# Patient Record
Sex: FEMALE | Race: BLACK OR AFRICAN AMERICAN | NOT HISPANIC OR LATINO | Employment: UNEMPLOYED | URBAN - METROPOLITAN AREA
[De-identification: names, ages, dates, MRNs, and addresses within clinical notes are randomized per-mention and may not be internally consistent; named-entity substitution may affect disease eponyms.]

---

## 2021-07-01 ENCOUNTER — PATIENT MESSAGE (OUTPATIENT)
Dept: ADMINISTRATIVE | Facility: OTHER | Age: 31
End: 2021-07-01

## 2022-09-28 ENCOUNTER — HOSPITAL ENCOUNTER (EMERGENCY)
Facility: HOSPITAL | Age: 32
Discharge: HOME | End: 2022-09-28
Attending: FAMILY MEDICINE
Payer: MEDICAID

## 2022-09-28 VITALS
RESPIRATION RATE: 16 BRPM | HEART RATE: 100 BPM | TEMPERATURE: 98 F | WEIGHT: 180 LBS | DIASTOLIC BLOOD PRESSURE: 87 MMHG | SYSTOLIC BLOOD PRESSURE: 132 MMHG | BODY MASS INDEX: 30.73 KG/M2 | HEIGHT: 64 IN | OXYGEN SATURATION: 100 %

## 2022-09-28 DIAGNOSIS — M25.472 PAIN AND SWELLING OF LEFT ANKLE: ICD-10-CM

## 2022-09-28 DIAGNOSIS — M25.572 PAIN AND SWELLING OF LEFT ANKLE: ICD-10-CM

## 2022-09-28 DIAGNOSIS — S82.852S TRIMALLEOLAR FRACTURE OF ANKLE, CLOSED, LEFT, SEQUELA: Primary | ICD-10-CM

## 2022-09-28 PROCEDURE — 99284 EMERGENCY DEPT VISIT MOD MDM: CPT | Mod: 25

## 2022-09-28 PROCEDURE — 63600175 PHARM REV CODE 636 W HCPCS: Performed by: PHYSICIAN ASSISTANT

## 2022-09-28 PROCEDURE — 25000003 PHARM REV CODE 250: Performed by: PHYSICIAN ASSISTANT

## 2022-09-28 PROCEDURE — 96372 THER/PROPH/DIAG INJ SC/IM: CPT | Performed by: PHYSICIAN ASSISTANT

## 2022-09-28 RX ORDER — HYDROCODONE BITARTRATE AND ACETAMINOPHEN 10; 325 MG/1; MG/1
1 TABLET ORAL EVERY 6 HOURS PRN
Qty: 18 TABLET | Refills: 0 | Status: ON HOLD | OUTPATIENT
Start: 2022-09-28 | End: 2022-10-11 | Stop reason: HOSPADM

## 2022-09-28 RX ORDER — HYDROCODONE BITARTRATE AND ACETAMINOPHEN 10; 325 MG/1; MG/1
1 TABLET ORAL
Status: COMPLETED | OUTPATIENT
Start: 2022-09-28 | End: 2022-09-28

## 2022-09-28 RX ORDER — KETOROLAC TROMETHAMINE 30 MG/ML
60 INJECTION, SOLUTION INTRAMUSCULAR; INTRAVENOUS
Status: COMPLETED | OUTPATIENT
Start: 2022-09-28 | End: 2022-09-28

## 2022-09-28 RX ADMIN — HYDROCODONE BITARTRATE AND ACETAMINOPHEN 1 TABLET: 10; 325 TABLET ORAL at 07:09

## 2022-09-28 RX ADMIN — KETOROLAC TROMETHAMINE 60 MG: 30 INJECTION, SOLUTION INTRAMUSCULAR; INTRAVENOUS at 07:09

## 2022-09-29 DIAGNOSIS — G89.29 CHRONIC PAIN OF LEFT ANKLE: Primary | ICD-10-CM

## 2022-09-29 DIAGNOSIS — M25.572 CHRONIC PAIN OF LEFT ANKLE: Primary | ICD-10-CM

## 2022-09-29 NOTE — ED PROVIDER NOTES
Encounter Date: 9/28/2022       History     Chief Complaint   Patient presents with    Leg Pain     PT W LT TRIMALLEOLAR FX ON 9/24/22 WHILE PLAYING VOLLEYBALL LANDED WRONG AND TWISTED IT. SEEN AT [facility], SPLINT INTACT BUT STILL WAITING ON ORTHO REFERRAL.  PT OUT OF PAIN MEDS.  NVI DISTALTO INJURY.       31 YO AAF in ER with complaints of broken left ankle on 9/25/2022 and needs surgery. Was initially seen at [facility] and was referred to Ortho at [facility] but she has not gotten an appointment yet. She is also out of pain medications. Denies fever, chills, chest pain, SOB, abdominal pain, N/V/D, HA or dizziness. No other complaints.     The history is provided by the patient.   Review of patient's allergies indicates:  No Known Allergies  Past Medical History:   Diagnosis Date    Arthritis      No past surgical history on file.  No family history on file.  Social History     Tobacco Use    Smoking status: Every Day     Packs/day: 0.50     Types: Cigarettes    Smokeless tobacco: Never   Substance Use Topics    Alcohol use: No    Drug use: No     Review of Systems   Constitutional:  Negative for chills and fever.   HENT:  Negative for congestion and sore throat.    Respiratory:  Negative for shortness of breath.    Cardiovascular:  Negative for chest pain.   Gastrointestinal:  Negative for abdominal pain, diarrhea, nausea and vomiting.   Genitourinary:  Negative for dysuria.   Musculoskeletal:  Positive for arthralgias and joint swelling. Negative for back pain.   Skin:  Negative for rash.   Neurological:  Negative for dizziness, weakness, light-headedness and headaches.   Hematological:  Does not bruise/bleed easily.   All other systems reviewed and are negative.    Physical Exam     Initial Vitals [09/28/22 1813]   BP Pulse Resp Temp SpO2   126/88 107 16 97.9 °F (36.6 °C) 100 %      MAP       --         Physical Exam    Nursing note and vitals reviewed.  Constitutional: She appears well-developed and well-nourished. She is not  diaphoretic. No distress.   HENT:   Head: Normocephalic and atraumatic.   Mouth/Throat: Oropharynx is clear and moist.   Eyes: Conjunctivae are normal.   Cardiovascular:  Normal rate, regular rhythm, normal heart sounds and intact distal pulses.           Pulmonary/Chest: Breath sounds normal.   Musculoskeletal:      Comments: LLE in splint (ankle stirrup and posterior leg), good pedal pulse to LLE and able to wiggle all toes, mild swelling noted to toes and distal aspect of dorsum of left foot     Neurological: She is alert and oriented to person, place, and time.   Skin: Skin is warm and dry.   Psychiatric: She has a normal mood and affect.       ED Course   Procedures  Labs Reviewed - No data to display       Imaging Results    None          Medications   ketorolac injection 60 mg (60 mg Intramuscular Given 9/28/22 1908)   HYDROcodone-acetaminophen  mg per tablet 1 tablet (1 tablet Oral Given 9/28/22 1908)                 ED Course as of 09/29/22 0039   Wed Sep 28, 2022   1918 Imaging uploaded to our system from [facility], will refer to Ortho today and new Rx for pain meds given, VSS, NAD, pt is non-toxic or ill appearing, treatment plan and discharge instructions including follow up discussed, pt verbalized understanding, all questions answered, pt is stable and ready for discharge  [TT]      ED Course User Index  [TT] GIOVANI De Dios                 Clinical Impression:   Final diagnoses:  [S82.852S] Trimalleolar fracture of ankle, closed, left, sequela (Primary)  [M25.572, M25.472] Pain and swelling of left ankle        ED Disposition Condition    Discharge Stable          ED Prescriptions       Medication Sig Dispense Start Date End Date Auth. Provider    HYDROcodone-acetaminophen (NORCO)  mg per tablet Take 1 tablet by mouth every 6 (six) hours as needed for Pain. 18 tablet 9/28/2022 -- GIOVANI De Dios          Follow-up Information       Follow up With Specialties Details Why Contact Info [facility]  Go in 1 week Orthopedics Clinic, they will call you with an appointment [address] [phone]    [facility] Emergency Medicine In 3 days As needed, If symptoms worsen [address] [id number] [phone]    Primary Care Provider  Schedule an appointment as soon as possible for a visit in 5 days  Call a PCP to make an appointment for follow up within 3-5  days.  You can all the phone number on the back of your insurance card for accepting providers as discussed.             GIOVANI De Dios  09/29/22 0039

## 2022-09-29 NOTE — DISCHARGE INSTRUCTIONS
Do not put any weight on your left foot/leg, continue using crutches.    Follow up with [facility] Ortho clinic within in 1 week, they will call you with an appointment.       Take medications as needed for pain. Do not take narcotic pain medications and drive or operate machinery.

## 2022-10-03 ENCOUNTER — ANESTHESIA EVENT (OUTPATIENT)
Dept: SURGERY | Facility: HOSPITAL | Age: 32
End: 2022-10-03
Payer: MEDICAID

## 2022-10-03 ENCOUNTER — HOSPITAL ENCOUNTER (OUTPATIENT)
Dept: RADIOLOGY | Facility: HOSPITAL | Age: 32
Discharge: HOME | End: 2022-10-03
Attending: STUDENT IN AN ORGANIZED HEALTH CARE EDUCATION/TRAINING PROGRAM
Payer: MEDICAID

## 2022-10-03 ENCOUNTER — OFFICE VISIT (OUTPATIENT)
Dept: ORTHOPEDICS | Facility: CLINIC | Age: 32
End: 2022-10-03
Payer: MEDICAID

## 2022-10-03 VITALS — HEIGHT: 64 IN | WEIGHT: 180 LBS | BODY MASS INDEX: 30.73 KG/M2

## 2022-10-03 DIAGNOSIS — S82.872D CLOSED DISPLACED PILON FRACTURE OF LEFT TIBIA WITH ROUTINE HEALING, SUBSEQUENT ENCOUNTER: Primary | ICD-10-CM

## 2022-10-03 DIAGNOSIS — M25.472 PAIN AND SWELLING OF LEFT ANKLE: ICD-10-CM

## 2022-10-03 DIAGNOSIS — S82.852S TRIMALLEOLAR FRACTURE OF ANKLE, CLOSED, LEFT, SEQUELA: ICD-10-CM

## 2022-10-03 DIAGNOSIS — S82.872D CLOSED DISPLACED PILON FRACTURE OF LEFT TIBIA WITH ROUTINE HEALING, SUBSEQUENT ENCOUNTER: ICD-10-CM

## 2022-10-03 DIAGNOSIS — M25.572 PAIN AND SWELLING OF LEFT ANKLE: ICD-10-CM

## 2022-10-03 PROCEDURE — 73610 X-RAY EXAM OF ANKLE: CPT | Mod: TC,LT

## 2022-10-03 PROCEDURE — 99204 PR OFFICE/OUTPT VISIT, NEW, LEVL IV, 45-59 MIN: ICD-10-PCS | Mod: S$PBB,,, | Performed by: ORTHOPAEDIC SURGERY

## 2022-10-03 PROCEDURE — 99204 OFFICE O/P NEW MOD 45 MIN: CPT | Mod: S$PBB,,, | Performed by: ORTHOPAEDIC SURGERY

## 2022-10-03 PROCEDURE — 99215 OFFICE O/P EST HI 40 MIN: CPT | Mod: PBBFAC

## 2022-10-03 RX ORDER — HYDROCODONE BITARTRATE AND ACETAMINOPHEN 10; 325 MG/1; MG/1
1 TABLET ORAL EVERY 6 HOURS PRN
Qty: 28 TABLET | Refills: 0 | Status: SHIPPED | OUTPATIENT
Start: 2022-10-03 | End: 2022-10-07

## 2022-10-03 RX ORDER — MUPIROCIN 20 MG/G
OINTMENT TOPICAL
Status: CANCELLED | OUTPATIENT
Start: 2022-10-03

## 2022-10-03 RX ORDER — SODIUM CHLORIDE 9 MG/ML
INJECTION, SOLUTION INTRAVENOUS CONTINUOUS
Status: CANCELLED | OUTPATIENT
Start: 2022-10-03

## 2022-10-03 NOTE — LETTER
October 3, 2022      [facility]  [address] [place] [id number]  Phone: [phone]       Patient: Sharlene Tong   YOB: 1990  Date of Visit: 10/03/2022    To Whom It May Concern:    Thaddeus Tong  was at [facility] on 10/03/2022. The patient is scheduled for surgery 10/11/2022.  If you have any questions or concerns, or if I can be of further assistance, please do not hesitate to contact me.    Sincerely,  Dr. Juan Childs

## 2022-10-03 NOTE — ANESTHESIA PREPROCEDURE EVALUATION
10/03/2022  Sharlene Tong is a 32 y.o., female with PMHx of obesity, smoking, RA presents for ORIF Lt pilon fx.    COVID STATUS: TEST DOS   Latest Reference Range & Units 10/11/22 06:19   SARS Coronavirus 2 Antigen Negative  Negative     BETA-BLOCKER: NONE    PAT NURSE PHONE INTERVIEW 10/7/22    PROBLEM LIST:  -  LEFT DISTAL TIB/FIB FRACTURE 2/2 VOLLEYBALL INJURY 9/24/22  -  UPT STATUS  -  OBESITY CLASS I  -  RA  (PREV. on PLAQUENIL, BUT SELF-D/C'd)  -  C4-5 DISC DISEASE  -  SMOKER 5-6PPY    AM Rx DOS: NORCO PRN    ORDERS -   SURGEON: 11/17/21 BMP; 12/6/21 CBC, RENAL/HEPATIC FUNC.; NO NEW ORDERS  ANESTHESIA: UPT    Pre-op Assessment    I have reviewed the NPO Status.      Review of Systems  Anesthesia Hx:  No problems with previous Anesthesia    Social:  Smoker    Cardiovascular:  Cardiovascular Normal     Pulmonary:  Pulmonary Normal    Renal/:  Renal/ Normal     Hepatic/GI:  Hepatic/GI Normal    Neurological:  Neurology Normal    Endocrine:  Obesity / BMI > 30    Vitals:    10/11/22 0513 10/11/22 0538 10/11/22 0540 10/11/22 0614   BP:  112/70 112/70 110/72   BP Location:    Right arm   Patient Position:    Lying   Pulse:  78  65   Resp:    20   Temp:  36.6 °C (97.8 °F)  36.3 °C (97.3 °F)   TempSrc:  Oral  Temporal   SpO2:  100%  100%   Weight: 81.6 kg (179 lb 14.3 oz)            Physical Exam  General: Alert, Cooperative and Well nourished    Airway:  Mallampati: II   Mouth Opening: Normal  TM Distance: Normal  Tongue: Normal  Neck ROM: Normal ROM    Dental:  Intact    Chest/Lungs:  Clear to auscultation, Normal Respiratory Rate    Heart:  Rate: Normal  Rhythm: Regular Rhythm  Sounds: Normal       Latest Reference Range & Units 10/11/22 06:19   Preg Test, Ur Negative  Negative     Lab Results   Component Value Date    WBC 8.79 12/06/2021    HGB 13.8 12/06/2021    HCT 42.3 12/06/2021    MCV  97 12/06/2021     12/06/2021       CMP  Sodium   Date Value Ref Range Status   10/14/2019 140 136 - 145 mmol/L Final     Sodium Level   Date Value Ref Range Status   11/17/2021 139 136 - 145 mmol/L Final     Potassium   Date Value Ref Range Status   10/14/2019 4.1 3.5 - 5.1 mmol/L Final     Potassium Level   Date Value Ref Range Status   11/17/2021 4.1 3.5 - 5.1 mmol/L Final     Chloride   Date Value Ref Range Status   10/14/2019 107 95 - 110 mmol/L Final     CO2   Date Value Ref Range Status   10/14/2019 24 23 - 29 mmol/L Final     Carbon Dioxide   Date Value Ref Range Status   11/17/2021 23 22 - 29 mmol/L Final     Glucose   Date Value Ref Range Status   10/14/2019 83 70 - 110 mg/dL Final     BUN   Date Value Ref Range Status   12/06/2021 12 6 - 20 mg/dL Final     Creatinine   Date Value Ref Range Status   12/06/2021 0.8 0.5 - 1.4 mg/dL Final     Calcium   Date Value Ref Range Status   10/14/2019 9.4 8.7 - 10.5 mg/dL Final     Calcium Level Total   Date Value Ref Range Status   11/17/2021 9.7 8.7 - 10.5 mg/dL Final     Total Protein   Date Value Ref Range Status   10/14/2019 7.3 6.0 - 8.4 g/dL Final     Albumin   Date Value Ref Range Status   10/14/2019 4.1 3.5 - 5.2 g/dL Final     Total Bilirubin   Date Value Ref Range Status   10/14/2019 0.4 0.1 - 1.0 mg/dL Final     Comment:     For infants and newborns, interpretation of results should be based  on gestational age, weight and in agreement with clinical  observations.  Premature Infant recommended reference ranges:  Up to 24 hours.............<8.0 mg/dL  Up to 48 hours............<12.0 mg/dL  3-5 days..................<15.0 mg/dL  6-29 days.................<15.0 mg/dL       Alkaline Phosphatase   Date Value Ref Range Status   10/14/2019 68 55 - 135 U/L Final     AST   Date Value Ref Range Status   12/06/2021 11 10 - 40 U/L Final     ALT   Date Value Ref Range Status   12/06/2021 12 10 - 44 U/L Final     Anion Gap   Date Value Ref Range Status    10/14/2019 9 8 - 16 mmol/L Final     eGFR if    Date Value Ref Range Status   12/06/2021 >60.0 >60 mL/min/1.73 m^2 Final     eGFR if non    Date Value Ref Range Status   12/06/2021 >60.0 >60 mL/min/1.73 m^2 Final     Comment:     Calculation used to obtain the estimated glomerular filtration  rate (eGFR) is the CKD-EPI equation.            Anesthesia Plan  Type of Anesthesia, risks & benefits discussed:    Anesthesia Type: Gen Supraglottic Airway  Intra-op Monitoring Plan: Standard ASA Monitors  Post Op Pain Control Plan: IV/PO Opioids PRN  Induction:  IV  Airway Plan: Direct  Informed Consent: Informed consent signed with the Patient and all parties understand the risks and agree with anesthesia plan.  All questions answered.   ASA Score: 2  Day of Surgery Review of History & Physical: H&P Update referred to the surgeon/provider.    Ready For Surgery From Anesthesia Perspective.     .

## 2022-10-03 NOTE — PROGRESS NOTES
[facility] Orthopaedic Surgery Clinic Note    In brief, Sharlene Tong is a 32 y.o. female presenting as a new patient with left ankle pain      Reports that she was playing volleyball on 09/25/2022.  Landed awkwardly on her left lower leg.  Felt a pop.  Was unable bear weight.  Presented to an outside emergency department.  Radiographs were obtained.  Patient was told she had a fractured left lower leg.  She was placed in a splint.  Referral placed.    Since this time, persistent pain in the left lower leg.  Compliant with nonweightbearing.  Has been using crutches as well as a wheelchair for mobilization.  She states that since time of injury she has had global numbness to the left foot.  She has intermittent muscle spasms as well as shooting electric pains into the foot.  No prior injuries to left foot and ankle.  No prior surgeries.  Patient previously worked at [employer].  She likes being outside however she is not active.  She smokes approximately 7-8 cigarettes per day.  She has rheumatoid arthritis.  She is on Plaquenil.    PMH:   Past Medical History:   Diagnosis Date    Arthritis        PSH: No past surgical history on file.    SH:   Social History     Socioeconomic History    Marital status: Single   Tobacco Use    Smoking status: Every Day     Packs/day: 0.50     Types: Cigarettes    Smokeless tobacco: Never   Substance and Sexual Activity    Alcohol use: No    Drug use: No       FH: No family history on file.    Allergies: Review of patient's allergies indicates:  No Known Allergies    ROS:  Constitutional- no fever, fatigue, weakness  Eye- no vision loss, eye redness, drainage, or pain  ENMT- no sore throat, ear pain, sinus pain/congestion, nasal congestion/drainage  Respiratory- no cough, wheezing, or shortness of breath  CV- no chest pain, no palpitations, no edema  GI- no N/V/D; no abdominal pain    Physical Exam:  There were no vitals filed for this visit.    General: NAD  Cardio: RRR by  peripheral pulse  Pulm: Normal WOB on room air, symmetric chest rise  Abd: Soft, NT/ND    MSK:  Short-leg splint removed   No open wounds  Swelling about the medial and lateral malleolus   Tenderness throughout the distal tibia and fibula  Motor intact; firing EHL/FHL; ankle range of motion deferred secondary to no injury   Significantly decreased sensation to saphenous/sural/SP/DP/T distributions the DP palpable; digits perfused    Imaging:   Radiographs of the left ankle demonstrate comminuted left distal tibia fracture with associated fibula fracture    Assessment:  32-year-old female history of rheumatoid arthritis sustaining left lower leg injury 09/25/2022 consistent with left pilon fx    Given the instability of the fracture, involvement of the tibial plafond as well as the need for mobilization patient would benefit from operative fixation of fracture.  At this time will order CT scan for preoperative planning.  Patient will be in a Cam boot.  Nonweightbearing to left lower extremity.  Strict elevation.  May ice the lower leg.  Plan for open reduction internal fixation left distal tibia/fibula 10/11/2022.  Norco prescription prescribed the patient today in clinic.    Discussed with the patient that she has increased risk of infection and complications given her rheumatoid arthritis as well as smoking history.  Infection for this procedure may result in loss of limb.  Patient understands this.  Counseled regarding smoking cessation.    Fred Romero MD  [facility] Orthopaedic Surgery  10/3/2022 2:34 PM

## 2022-10-07 ENCOUNTER — HOSPITAL ENCOUNTER (OUTPATIENT)
Dept: RADIOLOGY | Facility: HOSPITAL | Age: 32
Discharge: HOME | End: 2022-10-07
Attending: STUDENT IN AN ORGANIZED HEALTH CARE EDUCATION/TRAINING PROGRAM
Payer: MEDICAID

## 2022-10-07 PROCEDURE — 73700 CT LOWER EXTREMITY W/O DYE: CPT | Mod: TC,LT

## 2022-10-11 ENCOUNTER — HOSPITAL ENCOUNTER (OUTPATIENT)
Facility: HOSPITAL | Age: 32
Discharge: HOME | End: 2022-10-11
Attending: ORTHOPAEDIC SURGERY | Admitting: ORTHOPAEDIC SURGERY
Payer: MEDICAID

## 2022-10-11 ENCOUNTER — ANESTHESIA (OUTPATIENT)
Dept: SURGERY | Facility: HOSPITAL | Age: 32
End: 2022-10-11
Payer: MEDICAID

## 2022-10-11 VITALS
BODY MASS INDEX: 30.88 KG/M2 | SYSTOLIC BLOOD PRESSURE: 149 MMHG | OXYGEN SATURATION: 100 % | RESPIRATION RATE: 15 BRPM | DIASTOLIC BLOOD PRESSURE: 87 MMHG | WEIGHT: 179.88 LBS | TEMPERATURE: 98 F | HEART RATE: 95 BPM

## 2022-10-11 DIAGNOSIS — S82.872D CLOSED DISPLACED PILON FRACTURE OF LEFT TIBIA WITH ROUTINE HEALING, SUBSEQUENT ENCOUNTER: ICD-10-CM

## 2022-10-11 LAB
B-HCG UR QL: NEGATIVE
CTP QC/QA: YES
CTP QC/QA: YES
SARS-COV-2 AG RESP QL IA.RAPID: NEGATIVE

## 2022-10-11 PROCEDURE — 81025 URINE PREGNANCY TEST: CPT | Performed by: NURSE PRACTITIONER

## 2022-10-11 PROCEDURE — 01480 ANES OPEN PX LOWER L/A/F NOS: CPT | Performed by: ORTHOPAEDIC SURGERY

## 2022-10-11 PROCEDURE — 71000015 HC POSTOP RECOV 1ST HR: Performed by: ORTHOPAEDIC SURGERY

## 2022-10-11 PROCEDURE — 63600175 PHARM REV CODE 636 W HCPCS: Performed by: ORTHOPAEDIC SURGERY

## 2022-10-11 PROCEDURE — C1769 GUIDE WIRE: HCPCS | Performed by: ORTHOPAEDIC SURGERY

## 2022-10-11 PROCEDURE — 63600175 PHARM REV CODE 636 W HCPCS: Performed by: NURSE ANESTHETIST, CERTIFIED REGISTERED

## 2022-10-11 PROCEDURE — 25000003 PHARM REV CODE 250

## 2022-10-11 PROCEDURE — 37000009 HC ANESTHESIA EA ADD 15 MINS: Performed by: ORTHOPAEDIC SURGERY

## 2022-10-11 PROCEDURE — 36000711: Performed by: ORTHOPAEDIC SURGERY

## 2022-10-11 PROCEDURE — 27201423 OPTIME MED/SURG SUP & DEVICES STERILE SUPPLY: Performed by: ORTHOPAEDIC SURGERY

## 2022-10-11 PROCEDURE — 27828 PR OPEN TREATMENT FRACTURE DISTAL TIBIA & FIBULA: ICD-10-PCS | Mod: LT,,, | Performed by: ORTHOPAEDIC SURGERY

## 2022-10-11 PROCEDURE — 25000242 PHARM REV CODE 250 ALT 637 W/ HCPCS

## 2022-10-11 PROCEDURE — 27828 TREAT LOWER LEG FRACTURE: CPT | Mod: LT,,, | Performed by: ORTHOPAEDIC SURGERY

## 2022-10-11 PROCEDURE — 25000003 PHARM REV CODE 250: Performed by: ANESTHESIOLOGY

## 2022-10-11 PROCEDURE — 25000003 PHARM REV CODE 250: Performed by: NURSE ANESTHETIST, CERTIFIED REGISTERED

## 2022-10-11 PROCEDURE — 37000008 HC ANESTHESIA 1ST 15 MINUTES: Performed by: ORTHOPAEDIC SURGERY

## 2022-10-11 PROCEDURE — 71000039 HC RECOVERY, EACH ADD'L HOUR: Performed by: ORTHOPAEDIC SURGERY

## 2022-10-11 PROCEDURE — C1713 ANCHOR/SCREW BN/BN,TIS/BN: HCPCS | Performed by: ORTHOPAEDIC SURGERY

## 2022-10-11 PROCEDURE — 71000033 HC RECOVERY, INTIAL HOUR: Performed by: ORTHOPAEDIC SURGERY

## 2022-10-11 PROCEDURE — 63600175 PHARM REV CODE 636 W HCPCS: Performed by: STUDENT IN AN ORGANIZED HEALTH CARE EDUCATION/TRAINING PROGRAM

## 2022-10-11 PROCEDURE — 36000710: Performed by: ORTHOPAEDIC SURGERY

## 2022-10-11 PROCEDURE — 71000016 HC POSTOP RECOV ADDL HR: Performed by: ORTHOPAEDIC SURGERY

## 2022-10-11 PROCEDURE — 63600175 PHARM REV CODE 636 W HCPCS: Performed by: ANESTHESIOLOGY

## 2022-10-11 DEVICE — SCREW AXSOS CANC 4X34MM: Type: IMPLANTABLE DEVICE | Site: ANKLE | Status: FUNCTIONAL

## 2022-10-11 DEVICE — SCREW BONE CORTICAL 3.5X36MM T: Type: IMPLANTABLE DEVICE | Site: ANKLE | Status: FUNCTIONAL

## 2022-10-11 DEVICE — SCREW BONE NONLOCK T8 2.7X16MM: Type: IMPLANTABLE DEVICE | Site: ANKLE | Status: FUNCTIONAL

## 2022-10-11 DEVICE — SCREW VARIAX LOCKING 2.7X10MM: Type: IMPLANTABLE DEVICE | Site: ANKLE | Status: FUNCTIONAL

## 2022-10-11 DEVICE — SCREW BONE LOCK T10 3.5X12MM: Type: IMPLANTABLE DEVICE | Site: ANKLE | Status: FUNCTIONAL

## 2022-10-11 DEVICE — SCREW BONE NON LOCK 3.5X12MM: Type: IMPLANTABLE DEVICE | Site: ANKLE | Status: FUNCTIONAL

## 2022-10-11 DEVICE — SCREW BONE CORTICAL 3.5X24MM T: Type: IMPLANTABLE DEVICE | Site: ANKLE | Status: FUNCTIONAL

## 2022-10-11 DEVICE — SCREW VARIAX NON-LOK 2.7X18MM: Type: IMPLANTABLE DEVICE | Site: ANKLE | Status: FUNCTIONAL

## 2022-10-11 DEVICE — SCREW VARIAX LOK FT 2.7X18MM: Type: IMPLANTABLE DEVICE | Site: ANKLE | Status: FUNCTIONAL

## 2022-10-11 DEVICE — SCREW BONE CORTICAL 3.5X26MM T: Type: IMPLANTABLE DEVICE | Site: ANKLE | Status: FUNCTIONAL

## 2022-10-11 DEVICE — PLATE BONE FIB VARIAX LAT DIST: Type: IMPLANTABLE DEVICE | Site: ANKLE | Status: FUNCTIONAL

## 2022-10-11 DEVICE — SCREW BONE CORTICAL 3.5X32MM T: Type: IMPLANTABLE DEVICE | Site: ANKLE | Status: FUNCTIONAL

## 2022-10-11 DEVICE — SCREW VARIAX 2 LOK TI 2.710MM: Type: IMPLANTABLE DEVICE | Site: ANKLE | Status: FUNCTIONAL

## 2022-10-11 RX ORDER — HYDROMORPHONE HYDROCHLORIDE 1 MG/ML
INJECTION, SOLUTION INTRAMUSCULAR; INTRAVENOUS; SUBCUTANEOUS
Status: DISCONTINUED
Start: 2022-10-11 | End: 2022-10-11 | Stop reason: HOSPADM

## 2022-10-11 RX ORDER — DEXAMETHASONE SODIUM PHOSPHATE 4 MG/ML
INJECTION, SOLUTION INTRA-ARTICULAR; INTRALESIONAL; INTRAMUSCULAR; INTRAVENOUS; SOFT TISSUE
Status: DISCONTINUED | OUTPATIENT
Start: 2022-10-11 | End: 2022-10-11

## 2022-10-11 RX ORDER — ACETAMINOPHEN 10 MG/ML
INJECTION, SOLUTION INTRAVENOUS
Status: DISCONTINUED
Start: 2022-10-11 | End: 2022-10-11 | Stop reason: HOSPADM

## 2022-10-11 RX ORDER — KETAMINE HYDROCHLORIDE 10 MG/ML
INJECTION, SOLUTION INTRAMUSCULAR; INTRAVENOUS
Status: DISCONTINUED | OUTPATIENT
Start: 2022-10-11 | End: 2022-10-11

## 2022-10-11 RX ORDER — KETOROLAC TROMETHAMINE 30 MG/ML
INJECTION, SOLUTION INTRAMUSCULAR; INTRAVENOUS
Status: DISCONTINUED | OUTPATIENT
Start: 2022-10-11 | End: 2022-10-11

## 2022-10-11 RX ORDER — FENTANYL CITRATE 50 UG/ML
INJECTION, SOLUTION INTRAMUSCULAR; INTRAVENOUS
Status: DISCONTINUED | OUTPATIENT
Start: 2022-10-11 | End: 2022-10-11

## 2022-10-11 RX ORDER — OXYCODONE HYDROCHLORIDE 5 MG/1
5 TABLET ORAL EVERY 6 HOURS PRN
Qty: 28 TABLET | Refills: 0 | Status: SHIPPED | OUTPATIENT
Start: 2022-10-11 | End: 2022-10-18

## 2022-10-11 RX ORDER — MELOXICAM 7.5 MG/1
7.5 TABLET ORAL DAILY
Qty: 7 TABLET | Refills: 0 | Status: SHIPPED | OUTPATIENT
Start: 2022-10-11 | End: 2022-10-18

## 2022-10-11 RX ORDER — SODIUM CHLORIDE, SODIUM LACTATE, POTASSIUM CHLORIDE, CALCIUM CHLORIDE 600; 310; 30; 20 MG/100ML; MG/100ML; MG/100ML; MG/100ML
INJECTION, SOLUTION INTRAVENOUS CONTINUOUS
Status: DISCONTINUED | OUTPATIENT
Start: 2022-10-11 | End: 2023-03-09

## 2022-10-11 RX ORDER — ROPIVACAINE HYDROCHLORIDE 5 MG/ML
INJECTION, SOLUTION EPIDURAL; INFILTRATION; PERINEURAL
Status: DISCONTINUED
Start: 2022-10-11 | End: 2022-10-11 | Stop reason: WASHOUT

## 2022-10-11 RX ORDER — VANCOMYCIN HYDROCHLORIDE 1 G/20ML
INJECTION, POWDER, LYOPHILIZED, FOR SOLUTION INTRAVENOUS
Status: DISCONTINUED | OUTPATIENT
Start: 2022-10-11 | End: 2022-10-11 | Stop reason: HOSPADM

## 2022-10-11 RX ORDER — SODIUM CHLORIDE 9 MG/ML
INJECTION, SOLUTION INTRAVENOUS CONTINUOUS
Status: DISCONTINUED | OUTPATIENT
Start: 2022-10-11 | End: 2022-10-11 | Stop reason: HOSPADM

## 2022-10-11 RX ORDER — HYDROMORPHONE HYDROCHLORIDE 1 MG/ML
0.5 INJECTION, SOLUTION INTRAMUSCULAR; INTRAVENOUS; SUBCUTANEOUS EVERY 5 MIN PRN
Status: COMPLETED | OUTPATIENT
Start: 2022-10-11 | End: 2022-10-11

## 2022-10-11 RX ORDER — PROPOFOL 10 MG/ML
INJECTION, EMULSION INTRAVENOUS
Status: DISCONTINUED | OUTPATIENT
Start: 2022-10-11 | End: 2022-10-11

## 2022-10-11 RX ORDER — MORPHINE SULFATE 2 MG/ML
INJECTION, SOLUTION INTRAMUSCULAR; INTRAVENOUS
Status: DISCONTINUED
Start: 2022-10-11 | End: 2022-10-11 | Stop reason: HOSPADM

## 2022-10-11 RX ORDER — IBUPROFEN 600 MG/1
600 TABLET ORAL EVERY 8 HOURS PRN
Qty: 21 TABLET | Refills: 0 | Status: SHIPPED | OUTPATIENT
Start: 2022-10-11 | End: 2022-10-11 | Stop reason: HOSPADM

## 2022-10-11 RX ORDER — MIDAZOLAM HYDROCHLORIDE 1 MG/ML
INJECTION INTRAMUSCULAR; INTRAVENOUS
Status: DISCONTINUED
Start: 2022-10-11 | End: 2022-10-11 | Stop reason: HOSPADM

## 2022-10-11 RX ORDER — ONDANSETRON 4 MG/1
4 TABLET, FILM COATED ORAL 2 TIMES DAILY PRN
Qty: 14 TABLET | Refills: 0 | Status: SHIPPED | OUTPATIENT
Start: 2022-10-11 | End: 2023-03-09

## 2022-10-11 RX ORDER — ASPIRIN 81 MG/1
81 TABLET ORAL 2 TIMES DAILY
Qty: 56 TABLET | Refills: 0 | Status: SHIPPED | OUTPATIENT
Start: 2022-10-11 | End: 2024-01-22

## 2022-10-11 RX ORDER — CEFAZOLIN SODIUM 2 G/50ML
2 SOLUTION INTRAVENOUS
Status: COMPLETED | OUTPATIENT
Start: 2022-10-11 | End: 2022-10-11

## 2022-10-11 RX ORDER — ONDANSETRON 2 MG/ML
INJECTION INTRAMUSCULAR; INTRAVENOUS
Status: DISCONTINUED | OUTPATIENT
Start: 2022-10-11 | End: 2022-10-11

## 2022-10-11 RX ORDER — LIDOCAINE HYDROCHLORIDE 10 MG/ML
1 INJECTION, SOLUTION EPIDURAL; INFILTRATION; INTRACAUDAL; PERINEURAL ONCE
Status: ACTIVE | OUTPATIENT
Start: 2022-10-11

## 2022-10-11 RX ORDER — GABAPENTIN 100 MG/1
300 CAPSULE ORAL 3 TIMES DAILY
Qty: 63 CAPSULE | Refills: 0 | Status: SHIPPED | OUTPATIENT
Start: 2022-10-11 | End: 2022-10-11 | Stop reason: HOSPADM

## 2022-10-11 RX ORDER — ACETAMINOPHEN 500 MG
1000 TABLET ORAL 3 TIMES DAILY
Qty: 42 TABLET | Refills: 0 | Status: SHIPPED | OUTPATIENT
Start: 2022-10-11 | End: 2022-10-18

## 2022-10-11 RX ORDER — CYCLOBENZAPRINE HCL 5 MG
5 TABLET ORAL 3 TIMES DAILY PRN
Qty: 30 TABLET | Refills: 0 | Status: SHIPPED | OUTPATIENT
Start: 2022-10-11 | End: 2022-10-21

## 2022-10-11 RX ORDER — SODIUM CHLORIDE 0.9 % (FLUSH) 0.9 %
10 SYRINGE (ML) INJECTION
Status: DISCONTINUED | OUTPATIENT
Start: 2022-10-11 | End: 2022-10-11 | Stop reason: HOSPADM

## 2022-10-11 RX ORDER — ACETAMINOPHEN 10 MG/ML
1000 INJECTION, SOLUTION INTRAVENOUS ONCE
Status: COMPLETED | OUTPATIENT
Start: 2022-10-11 | End: 2022-10-11

## 2022-10-11 RX ORDER — LIDOCAINE HCL/PF 100 MG/5ML
SYRINGE (ML) INTRAVENOUS
Status: DISCONTINUED | OUTPATIENT
Start: 2022-10-11 | End: 2022-10-11

## 2022-10-11 RX ORDER — MUPIROCIN 20 MG/G
OINTMENT TOPICAL
Status: DISCONTINUED | OUTPATIENT
Start: 2022-10-11 | End: 2022-10-11 | Stop reason: HOSPADM

## 2022-10-11 RX ORDER — GLYCOPYRROLATE 0.2 MG/ML
INJECTION INTRAMUSCULAR; INTRAVENOUS
Status: DISCONTINUED | OUTPATIENT
Start: 2022-10-11 | End: 2022-10-11

## 2022-10-11 RX ORDER — MEPERIDINE HYDROCHLORIDE 25 MG/ML
12.5 INJECTION INTRAMUSCULAR; INTRAVENOUS; SUBCUTANEOUS EVERY 10 MIN PRN
Status: DISCONTINUED | OUTPATIENT
Start: 2022-10-11 | End: 2022-10-11 | Stop reason: HOSPADM

## 2022-10-11 RX ORDER — ONDANSETRON 2 MG/ML
4 INJECTION INTRAMUSCULAR; INTRAVENOUS DAILY PRN
Status: DISCONTINUED | OUTPATIENT
Start: 2022-10-11 | End: 2022-10-11 | Stop reason: HOSPADM

## 2022-10-11 RX ORDER — OXYCODONE HYDROCHLORIDE 5 MG/1
5 TABLET ORAL
Status: DISCONTINUED | OUTPATIENT
Start: 2022-10-11 | End: 2022-10-11 | Stop reason: HOSPADM

## 2022-10-11 RX ORDER — ONDANSETRON 2 MG/ML
INJECTION INTRAMUSCULAR; INTRAVENOUS
Status: DISCONTINUED
Start: 2022-10-11 | End: 2022-10-11 | Stop reason: HOSPADM

## 2022-10-11 RX ORDER — MORPHINE SULFATE 2 MG/ML
2 INJECTION, SOLUTION INTRAMUSCULAR; INTRAVENOUS EVERY 5 MIN PRN
Status: DISCONTINUED | OUTPATIENT
Start: 2022-10-11 | End: 2022-10-11 | Stop reason: HOSPADM

## 2022-10-11 RX ORDER — LABETALOL HYDROCHLORIDE 5 MG/ML
INJECTION, SOLUTION INTRAVENOUS
Status: DISCONTINUED | OUTPATIENT
Start: 2022-10-11 | End: 2022-10-11

## 2022-10-11 RX ORDER — OXYCODONE AND ACETAMINOPHEN 5; 325 MG/1; MG/1
1 TABLET ORAL EVERY 6 HOURS PRN
Qty: 28 TABLET | Refills: 0 | Status: SHIPPED | OUTPATIENT
Start: 2022-10-11 | End: 2022-10-11 | Stop reason: HOSPADM

## 2022-10-11 RX ORDER — MIDAZOLAM HYDROCHLORIDE 1 MG/ML
2 INJECTION INTRAMUSCULAR; INTRAVENOUS ONCE AS NEEDED
Status: COMPLETED | OUTPATIENT
Start: 2022-10-11 | End: 2022-10-11

## 2022-10-11 RX ADMIN — KETAMINE HYDROCHLORIDE 25 MG: 10 INJECTION, SOLUTION INTRAMUSCULAR; INTRAVENOUS at 09:10

## 2022-10-11 RX ADMIN — KETOROLAC TROMETHAMINE 30 MG: 30 INJECTION, SOLUTION INTRAMUSCULAR; INTRAVENOUS at 09:10

## 2022-10-11 RX ADMIN — FENTANYL CITRATE 50 MCG: 50 INJECTION, SOLUTION INTRAMUSCULAR; INTRAVENOUS at 09:10

## 2022-10-11 RX ADMIN — OXYCODONE HYDROCHLORIDE 5 MG: 5 TABLET ORAL at 11:10

## 2022-10-11 RX ADMIN — FENTANYL CITRATE 25 MCG: 50 INJECTION, SOLUTION INTRAMUSCULAR; INTRAVENOUS at 07:10

## 2022-10-11 RX ADMIN — RACEPINEPHRINE HYDROCHLORIDE 0.5 ML: 11.25 SOLUTION RESPIRATORY (INHALATION) at 09:10

## 2022-10-11 RX ADMIN — ACETAMINOPHEN 1000 MG: 10 INJECTION, SOLUTION INTRAVENOUS at 10:10

## 2022-10-11 RX ADMIN — HYDROMORPHONE HYDROCHLORIDE 0.5 MG: 1 INJECTION, SOLUTION INTRAMUSCULAR; INTRAVENOUS; SUBCUTANEOUS at 10:10

## 2022-10-11 RX ADMIN — CEFAZOLIN SODIUM 2 G: 2 SOLUTION INTRAVENOUS at 07:10

## 2022-10-11 RX ADMIN — LABETALOL HYDROCHLORIDE 2.5 MG: 5 INJECTION, SOLUTION INTRAVENOUS at 08:10

## 2022-10-11 RX ADMIN — FENTANYL CITRATE 25 MCG: 50 INJECTION, SOLUTION INTRAMUSCULAR; INTRAVENOUS at 08:10

## 2022-10-11 RX ADMIN — ONDANSETRON 4 MG: 2 INJECTION INTRAMUSCULAR; INTRAVENOUS at 09:10

## 2022-10-11 RX ADMIN — SODIUM CHLORIDE, POTASSIUM CHLORIDE, SODIUM LACTATE AND CALCIUM CHLORIDE: 600; 310; 30; 20 INJECTION, SOLUTION INTRAVENOUS at 06:10

## 2022-10-11 RX ADMIN — FENTANYL CITRATE 25 MCG: 50 INJECTION, SOLUTION INTRAMUSCULAR; INTRAVENOUS at 09:10

## 2022-10-11 RX ADMIN — MORPHINE SULFATE 2 MG: 2 INJECTION, SOLUTION INTRAMUSCULAR; INTRAVENOUS at 10:10

## 2022-10-11 RX ADMIN — LABETALOL HYDROCHLORIDE 2.5 MG: 5 INJECTION, SOLUTION INTRAVENOUS at 07:10

## 2022-10-11 RX ADMIN — ONDANSETRON 4 MG: 2 INJECTION INTRAMUSCULAR; INTRAVENOUS at 10:10

## 2022-10-11 RX ADMIN — KETAMINE HYDROCHLORIDE 25 MG: 10 INJECTION, SOLUTION INTRAMUSCULAR; INTRAVENOUS at 07:10

## 2022-10-11 RX ADMIN — PROPOFOL 125 MG: 10 INJECTION, EMULSION INTRAVENOUS at 07:10

## 2022-10-11 RX ADMIN — LIDOCAINE HYDROCHLORIDE 50 MG: 20 INJECTION, SOLUTION INTRAVENOUS at 07:10

## 2022-10-11 RX ADMIN — GLYCOPYRROLATE 0.2 MG: 0.2 INJECTION INTRAMUSCULAR; INTRAVENOUS at 07:10

## 2022-10-11 RX ADMIN — FENTANYL CITRATE 50 MCG: 50 INJECTION, SOLUTION INTRAMUSCULAR; INTRAVENOUS at 07:10

## 2022-10-11 RX ADMIN — DEXAMETHASONE SODIUM PHOSPHATE 8 MG: 4 INJECTION, SOLUTION INTRA-ARTICULAR; INTRALESIONAL; INTRAMUSCULAR; INTRAVENOUS; SOFT TISSUE at 07:10

## 2022-10-11 RX ADMIN — MIDAZOLAM 2 MG: 1 INJECTION INTRAMUSCULAR; INTRAVENOUS at 06:10

## 2022-10-11 RX ADMIN — LIDOCAINE HYDROCHLORIDE 50 MG: 20 INJECTION, SOLUTION INTRAVENOUS at 09:10

## 2022-10-11 NOTE — DISCHARGE INSTRUCTIONS
· Keep follow up appointment on October 24th at 10:10 am at [facility].    · Take pain medications as prescribed.    · Keep leg elevated on pillow.  NO WEIGHT BEARING TO LEFT LEG.    · No driving or consuming alcohol for the next 24 hours or while taking narcotic pain medicine.    · May apply ice pack to surgical area for 20 minutes at a time 6-8 times per day.    · Dressing: Leave clean and dry until follow up appointment.    · Notify MD of any moderate to severe pain unrelieved by pain medicine or for any signs of infection including fever above 100.4, excessive redness or swelling, yellow/green foul- smelling drainage, nausea or vomiting. Call clinic at: [phone]. After business hours, if you are unable to reach a doctor on call at [phone] or your concern is an emergency, call [phone] or report to your nearest emergency room.    · Thanks for choosing [facility]! Have a smooth recovery!

## 2022-10-11 NOTE — TRANSFER OF CARE
Anesthesia Transfer of Care Note    Patient: Sharlene Tong    Procedure(s) Performed: Procedure(s) (LRB):  ORIF, FRACTURE, PILON (Left)    Patient location: PACU    Anesthesia Type: general    Transport from OR: Transported from OR on room air with adequate spontaneous ventilation    Post pain: adequate analgesia    Post assessment: no apparent anesthetic complications and tolerated procedure well    Post vital signs: stable    Level of consciousness: sedated    Nausea/Vomiting: no nausea/vomiting    Complications: none    Transfer of care protocol was followed      Last vitals:   Visit Vitals  /74 (BP Location: Right arm, Patient Position: Lying)   Pulse (!) 115   Temp 36.4 °C (97.5 °F) (Temporal)   Resp 20   Wt 81.6 kg (179 lb 14.3 oz)   SpO2 96%   Breastfeeding No   BMI 30.88 kg/m²

## 2022-10-11 NOTE — ANESTHESIA POSTPROCEDURE EVALUATION
Anesthesia Post Evaluation    Patient: Sharlene Tong    Procedure(s) Performed: Procedure(s) (LRB):  ORIF, FRACTURE, PILON (Left)    Final Anesthesia Type: general      Patient location during evaluation: PACU  Post-procedure vital signs: reviewed and stable  Airway patency: patent      Anesthetic complications: no      Cardiovascular status: hemodynamically stable  Respiratory status: spontaneous ventilation  Follow-up not needed.          Vitals Value Taken Time   /74 10/11/22 0955   Temp 36.4 °C (97.5 °F) 10/11/22 0955   Pulse 115 10/11/22 0959   Resp 16 10/11/22 1020   SpO2 96 % 10/11/22 0959         No case tracking events are documented in the log.      Pain/Abdoul Score: Pain Rating Prior to Med Admin: 10 (10/11/2022 10:34 AM)

## 2022-10-11 NOTE — ANESTHESIA PROCEDURE NOTES
Intubation    Date/Time: 10/11/2022 7:06 AM  Performed by: Aubrey Yusuf CRNA  Authorized by: Shala Mascorro MD     Intubation:     Induction:  Intravenous    Intubated:  Postinduction    Mask Ventilation:  Easy mask    Attempts:  1    Attempted By:  CRNA    Difficult Airway Encountered?: No      Airway Device:  Supraglottic airway/LMA    Airway Device Size:  4.0    Style/Cuff Inflation:  Uncuffed    Placement Verified By:  Capnometry    Complicating Factors:  None    Findings Post-Intubation:  BS equal bilateral and atraumatic/condition of teeth unchanged  Notes:      No leak @ 20 cm/ h2o

## 2022-10-11 NOTE — H&P
Interval H&P    Patient seen and examined in preop holding area. No changes to history or exam other than noted below.    To OR today for ORIF L pilon     [facility] Orthopaedic Surgery Clinic Note     In brief, Sharlene Tong is a 32 y.o. female presenting as a new patient with left ankle pain        Reports that she was playing volleyball on 09/25/2022.  Landed awkwardly on her left lower leg.  Felt a pop.  Was unable bear weight.  Presented to an outside emergency department.  Radiographs were obtained.  Patient was told she had a fractured left lower leg.  She was placed in a splint.  Referral placed.    Since this time, persistent pain in the left lower leg.  Compliant with nonweightbearing.  Has been using crutches as well as a wheelchair for mobilization.  She states that since time of injury she has had global numbness to the left foot.  She has intermittent muscle spasms as well as shooting electric pains into the foot.  No prior injuries to left foot and ankle.  No prior surgeries.  Patient previously worked at [employer].  She likes being outside however she is not active.  She smokes approximately 7-8 cigarettes per day.  She has rheumatoid arthritis.  She is on Plaquenil.     PMH:        Past Medical History:   Diagnosis Date    Arthritis           PSH: No past surgical history on file.     SH:   Social History               Socioeconomic History    Marital status: Single   Tobacco Use    Smoking status: Every Day       Packs/day: 0.50       Types: Cigarettes    Smokeless tobacco: Never   Substance and Sexual Activity    Alcohol use: No    Drug use: No            FH: No family history on file.     Allergies: Review of patient's allergies indicates:  No Known Allergies     ROS:  Constitutional- no fever, fatigue, weakness  Eye- no vision loss, eye redness, drainage, or pain  ENMT- no sore throat, ear pain, sinus pain/congestion, nasal congestion/drainage  Respiratory- no cough, wheezing, or  shortness of breath  CV- no chest pain, no palpitations, no edema  GI- no N/V/D; no abdominal pain     Physical Exam:  There were no vitals filed for this visit.     General: NAD  Cardio: RRR by peripheral pulse  Pulm: Normal WOB on room air, symmetric chest rise  Abd: Soft, NT/ND     MSK:  Short-leg splint removed   No open wounds  Swelling about the medial and lateral malleolus   Tenderness throughout the distal tibia and fibula  Motor intact; firing EHL/FHL; ankle range of motion deferred secondary to no injury   Significantly decreased sensation to saphenous/sural/SP/DP/T distributions the DP palpable; digits perfused     Imaging:   Radiographs of the left ankle demonstrate comminuted left distal tibia fracture with associated fibula fracture     Assessment:  32-year-old female history of rheumatoid arthritis sustaining left lower leg injury 09/25/2022 consistent with left pilon fx     Given the instability of the fracture, involvement of the tibial plafond as well as the need for mobilization patient would benefit from operative fixation of fracture.  At this time will order CT scan for preoperative planning.  Patient will be in a Cam boot.  Nonweightbearing to left lower extremity.  Strict elevation.  May ice the lower leg.  Plan for open reduction internal fixation left distal tibia/fibula 10/11/2022.  Norco prescription prescribed the patient today in clinic.     Discussed with the patient that she has increased risk of infection and complications given her rheumatoid arthritis as well as smoking history.  Infection for this procedure may result in loss of limb.  Patient understands this.  Counseled regarding smoking cessation.

## 2022-10-11 NOTE — DISCHARGE SUMMARY
[facility]  Discharge Note  Short Stay    Procedure(s) (LRB):  ORIF, FRACTURE, PILON (Left)      OUTCOME: Patient tolerated treatment/procedure well without complication and is now ready for discharge.    DISPOSITION: Home or Self Care    FINAL DIAGNOSIS:  <principal problem not specified>    FOLLOWUP: In clinic    DISCHARGE INSTRUCTIONS:    Discharge Procedure Orders   Leave dressing on - Keep it clean, dry, and intact until clinic visit     Weight bearing restrictions (specify):   Order Comments: MAYITO HAMMOND        TIME SPENT ON DISCHARGE: 30 minutes

## 2022-10-11 NOTE — OP NOTE
[facility] [address] [place] [address]    PATIENT NAME:      ISAÍAS AVALOS  YOB: 1990  CSN:               [id number]  MRN:               [id number]  ADMIT DATE:        10/11/2022 05:09:00  PHYSICIAN:         Momo Childs MD                          OPERATIVE REPORT      DATE OF SURGERY:    10/11/2022 00:00:00    SURGEON:  Momo Chidls MD    PREOPERATIVE DIAGNOSIS:  Left pilon fracture.    POSTOPERATIVE DIAGNOSIS:  Left pilon fracture.    PROCEDURE PERFORMED:  Open reduction, internal fixation of left pilon fracture.    IMPLANTS USED:    Implant Name Type Inv. Item Serial No.  Lot No. LRB No. Used Action   K-WIRE TRCR PNT [id number] - [id number]  K-WIRE TRCR PNT [id number] SS  JOANA [id number]  Left 4 Implanted and Explanted   6 hole plate    JOANA  Left 1 Implanted and Explanted   8 hole plate    JOANA  Left 1 Implanted   LOG [id number] - JOANA ANKLE SET - 1      Left     SCREW BONE CORTICAL 3.5X32MM T - [id number]  SCREW BONE CORTICAL 3.5X32MM T  JOANA SALES RIMA.  Left 2 Implanted   LOG [id number] - JOANA ANKLE SET - 2      Left     SCREW BONE CORTICAL 3.5X36MM T - [id number]  SCREW BONE CORTICAL 3.5X36MM T  JOANA [employer].  Left 2 Implanted   SCREW BONE CORTICAL 3.5X24MM T - [id number]  SCREW BONE CORTICAL 3.5X24MM T  JOANA [employer]IM[employer].  Left 2 Implanted   SCREW AXSOS CANC 4X34MM - [id number]  SCREW AXSOS CANC 4X34MM  JOANA [employer]IM[employer].  Left 2 Implanted   SCREW BONE CORTICAL 3.5X26MM T - [id number]  SCREW BONE CORTICAL 3.5X26MM T  JOANA [employer] RIM[employer].  Left 2 Implanted   SCREW BONE CORTICAL 3.5X28MM T - [id number]  SCREW BONE CORTICAL 3.5X28MM T  JOANA [employer] RIM[employer].  Left 1 Implanted and Explanted   SCREW BONE AXSOS 4X28MM TI - [id number]  SCREW BONE AXSOS 4X28MM TI  JOANA [employer]IM[employer].  Left 1 Implanted and Explanted   SCREW BONE NONLOCK T8  2.7X16MM - [id number]  SCREW BONE NONLOCK T8 2.7X16MM  JOANA Sense of Skin RIMA.  Left 1 Implanted   SCREW VARIAX NON-BEENA 2.7X18MM - [id number]  SCREW VARIAX NON-BEENA 2.7X18MM  JOANA [employer] RIMA.  Left 1 Implanted   SCREW VARIAX NON BEENA 2.7X20MM - [id number]  SCREW VARIAX NON BEENA 2.7X20MM  JOANA Sense [employer] Skin RIMA.  Left 1 Implanted and Explanted   SCREW BONE THREADED T8 - [id number]  SCREW BONE THREADED T8  JOANAPicateers RIMA.  Left 1 Implanted and Explanted   SCREW VARIAX LOCKING 2.7X10MM - [id number]  SCREW VARIAX LOCKING 2.7X10MM  JOANAPicateers RIMA.  Left 3 Implanted   SCREW VARIAX BEENA FT 2.7X18MM - [id number]  SCREW VARIAX BEENA FT 2.7X18MM  JOANAPicateers RIMA.  Left 1 Implanted   SCREW TITANIUM BEENA 2.7X12MM - [id number]  SCREW TITANIUM BEENA 2.7X12MM  Ziptr RIMA.  Left 1 Implanted   PLATE BONE FIB VARIAX LAT DIST - [id number]  PLATE BONE FIB VARIAX LAT DIST  JOANAPicateers RIMA.  Left 1 Implanted   SCREW VARIAX 2 BEENA TI 2.710MM - [id number]  SCREW VARIAX 2 BEENA TI 2.710MM  JOANA Sense [employer] Skin RIMA.  Left 2 Implanted   SCREW BONE NON LOCK 3.5X12MM - [id number]  SCREW BONE NON LOCK 3.5X12MM  JOANAPicateers RIMA.  Left 5 Implanted   SCREW BONE NON LOCK 3.5X12MM - [id number]  SCREW BONE NON LOCK 3.5X12MM  JOANA Sense [employer] Skin RIMA.  Left 1 Implanted and Explanted   SCREW BONE NON LOCK 3.5X14MM - [id number]  SCREW BONE NON LOCK 3.5X14MM  JOANAPicateers RIMA.  Left 1 Implanted and Explanted   SCREW BONE LOCK T10 3.5X12MM - [id number]  SCREW BONE LOCK T10 3.5X12MM  JOANA Sense [employer] Skin RIMA.  Left 1 Implanted   [id number] - JOANA ANKLE SET - 3           2.7x8 lock screw      Left 1 Implanted   2.7 x 8 screw      Left 2 Implanted   2.7 x 10 screw      Left 1 Implanted   4.0 x 32 plate distal anterior lateral       Left 2 Implanted   2.7 y plate      Left 1 Implanted         RESIDENT PHYSICIAN:  Dr. Fred Sheffield, PGY-5; Dr. Taiwo Slade,PGY-3.    ANESTHESIA:  General.    TOURNIQUET:  250 mmHg for 100 minutes.    ESTIMATED BLOOD LOSS:  150  cc.    INTRAOPERATIVE COMPLICATIONS:  None.    PREOPERATIVE NOTE:  Ms. Tong is a 32-year-old female who came to our clinic for   assessment of her left ankle injury.  She reports that she was playing   volleyball on September 25, 2022, when she landed awkwardly and hurt her left   ankle.  She went to a local emergency room and was diagnosed with the   aforementioned fracture.  She was referred to our clinic for followup assessment   and definitive management.  Unfortunately, she had difficulty finding an   orthopedic surgeon to accept her insurance and came to see us in a delayed   fashion.    PROCEDURE IN DETAIL:  The patient was brought to the operating room, positioned   supine on the OR table.  General anesthetic was administered by anesthesia   colleagues.  A preop pause was performed to confirm the site and side of   surgery.  The left lower extremity was prepped and draped in the usual fashion.    Preoperative antibiotics were given.  The extremity was exsanguinated,   tourniquet inflated to 250 mmHg.    I began by making an incision in the anterior aspect of the patient's ankle just   lateral to the tibial crest.  It was taken through the subcutaneous tissue.  We   identified the tibialis anterior, mobilized it laterally.  We exposed the   fracture site itself.  The fracture was opened and cleaned of any incarcerated   soft tissue.  We extended our incision distally to open up the anterior aspect   of the ankle joint and removed any loose bodies under direct visualization as   well as with irrigation.    The large anterior lateral fragment was then reduced and held in place with a   Verbrugge clamp. It was lagged in place using a 3.5mm cortical screw and lag by technique.  The fracture was thenstabilized with a Alli axos anterior lateral anatomic  tibial locking plate with a combination of locking and   cortical screws.  Intraoperative fluoroscopy confirmed appropriate position of   our hardware and near  anatomic reduction of her fracture.    We then turned our attention to the patient's medial malleolus through the same   incision.  It was reduced and provisionally held with k-wires then buttressed with a Starke Mini fragment plate.  Intraoperative   fluoroscopy again confirmed near anatomic reduction of the articular surface   and the appropriate position of our hardware.    We then turned our attention to the patient's fibular fracture.  An incision was   made over the posterolateral aspect of the patient's ankle.  It was taken down   through subcutaneous tissue.  Soft tissue was elevated off the fibula, and the   fracture was exposed.  Fracture was reduced and held in place with a pointed   reduction forceps.  Unfortunately, there was significant comminution in the   anterior aspect of the patient's fracture, and was not amenable to lag   fixation.    The fibular fracture was then stabilized with a Alli VariAx fibular locking   plate.  Again, intraoperative fluoroscopy confirmed near anatomic reduction of   our fracture and appropriate position of hardware.    Using intraoperative fluoroscopy, the syndesmosis was stretched with external   rotation stress radiographs.  We confirmed that the ankle and syndesmosis were   stable with no medial clear space or syndesmotic widening.    The wound was thoroughly irrigated with a combination of saline, followed by   peroxide, followed by saline.  1 g powdered vancomycin was divided between the   patient's wounds.  Tourniquet was let down.  Meticulous hemostasis was obtained.    The subcutaneous tissue was closed with 2-0 Vicryl suture.  Skin was closed   with 3-0 nylon sutures.  Sterile dressing was applied.  The patient was placed   in a short-leg splint.  General anesthetic was reversed and the patient was   transferred to the hospital stretcher, then brought to the recovery room in   stable condition.  There were no intraoperative complications.    POSTOPERATIVE  PLAN:  Patient will be nonweightbearing to the left lower   extremity for 6 weeks from time of surgery.  I will see her back in 2 weeks'   time for repeat clinical examination and imaging.        ______________________________  MD DARRIAN Ruano/AQS  DD:  10/11/2022  Time:  09:59AM  DT:  10/11/2022  Time:  10:34AM  Job #:  [id number]      OPERATIVE REPORT

## 2022-10-21 RX ORDER — HYDROCODONE BITARTRATE AND ACETAMINOPHEN 5; 325 MG/1; MG/1
1 TABLET ORAL EVERY 6 HOURS PRN
Qty: 28 TABLET | Refills: 0 | Status: SHIPPED | OUTPATIENT
Start: 2022-10-21 | End: 2022-10-28

## 2022-10-21 RX ORDER — CYCLOBENZAPRINE HCL 5 MG
5 TABLET ORAL 3 TIMES DAILY PRN
Qty: 21 TABLET | Refills: 0 | Status: SHIPPED | OUTPATIENT
Start: 2022-10-21 | End: 2022-10-28

## 2022-10-24 ENCOUNTER — HOSPITAL ENCOUNTER (OUTPATIENT)
Dept: RADIOLOGY | Facility: HOSPITAL | Age: 32
Discharge: HOME | End: 2022-10-24
Attending: STUDENT IN AN ORGANIZED HEALTH CARE EDUCATION/TRAINING PROGRAM
Payer: MEDICAID

## 2022-10-24 ENCOUNTER — OFFICE VISIT (OUTPATIENT)
Dept: ORTHOPEDICS | Facility: CLINIC | Age: 32
End: 2022-10-24
Payer: MEDICAID

## 2022-10-24 VITALS — WEIGHT: 179 LBS | BODY MASS INDEX: 30.56 KG/M2 | HEIGHT: 64 IN

## 2022-10-24 DIAGNOSIS — M25.572 LEFT ANKLE PAIN, UNSPECIFIED CHRONICITY: Primary | ICD-10-CM

## 2022-10-24 DIAGNOSIS — M25.572 LEFT ANKLE PAIN, UNSPECIFIED CHRONICITY: ICD-10-CM

## 2022-10-24 PROCEDURE — 99213 OFFICE O/P EST LOW 20 MIN: CPT | Mod: PBBFAC

## 2022-10-24 PROCEDURE — 1159F PR MEDICATION LIST DOCUMENTED IN MEDICAL RECORD: ICD-10-PCS | Mod: CPTII,,, | Performed by: ORTHOPAEDIC SURGERY

## 2022-10-24 PROCEDURE — 1159F MED LIST DOCD IN RCRD: CPT | Mod: CPTII,,, | Performed by: ORTHOPAEDIC SURGERY

## 2022-10-24 PROCEDURE — 99024 POSTOP FOLLOW-UP VISIT: CPT | Mod: ,,, | Performed by: ORTHOPAEDIC SURGERY

## 2022-10-24 PROCEDURE — 99024 PR POST-OP FOLLOW-UP VISIT: ICD-10-PCS | Mod: ,,, | Performed by: ORTHOPAEDIC SURGERY

## 2022-10-24 PROCEDURE — 73610 X-RAY EXAM OF ANKLE: CPT | Mod: TC,LT

## 2022-10-24 RX ORDER — OXYCODONE HYDROCHLORIDE 5 MG/1
5 TABLET ORAL EVERY 6 HOURS PRN
Qty: 28 TABLET | Refills: 0 | Status: SHIPPED | OUTPATIENT
Start: 2022-10-24 | End: 2023-03-09

## 2022-10-24 NOTE — PROGRESS NOTES
[facility] Orthopaedic Surgery Clinic Note    In brief, Sharlene Tong is a 32 y.o. female s/p ORIF L pilon 10/11/22    Patient reports that she has been doing relatively well.  Initial pain for surgery is gradually improving.  Has been compliant with nonweightbearing.  Has maintain splint.  Has not gone splint wet.  No numbness or tingling overall doing well.  No associated symptoms of infection.      PMH:   Past Medical History:   Diagnosis Date    Arthritis        PSH:   Past Surgical History:   Procedure Laterality Date    OPEN REDUCTION AND INTERNAL FIXATION (ORIF) OF PILON FRACTURE Left 10/11/2022    Procedure: ORIF, FRACTURE, PILON;  Surgeon: Momo Childs MD;  Location: [facility];  Service: Orthopedics;  Laterality: Left;       SH:   Social History     Socioeconomic History    Marital status: Single   Tobacco Use    Smoking status: Every Day     Packs/day: 0.50     Years: 11.00     Pack years: 5.50     Types: Cigarettes    Smokeless tobacco: Never   Substance and Sexual Activity    Alcohol use: No     Comment: occasionally    Drug use: No       FH: History reviewed. No pertinent family history.    Allergies: Review of patient's allergies indicates:  No Known Allergies    ROS:  Constitutional- no fever, fatigue, weakness  Eye- no vision loss, eye redness, drainage, or pain  ENMT- no sore throat, ear pain, sinus pain/congestion, nasal congestion/drainage  Respiratory- no cough, wheezing, or shortness of breath  CV- no chest pain, no palpitations, no edema  GI- no N/V/D; no abdominal pain    Physical Exam:  There were no vitals filed for this visit.    General: NAD  Cardio: RRR by peripheral pulse  Pulm: Normal WOB on room air, symmetric chest rise  Abd: Soft, NT/ND    MSK:  Surgical incisions healing without surrounding erythema induration   Nylon suture in place  Some swelling most pronounced dorsal foot   Sensation intact   Motor intact TA/GS/EHL/FHL  DP palpable; digits perfused    Imaging:    Radiographs demonstrate near anatomic alignment of the left tibial plafond; ankle mortise intact; no evidence of hardware failure loosening    Assessment:  32 y.o. female s/p ORIF KAMARI degroot 10/11/22    At this time, we will transition the patient to a CAM boot.  Discussed wound care consisting of gentle soap and water.  No abrasive materials.  No creams or ointments.  Ice and elevation.  Strict nonweightbearing.  Anticipate nonweightbearing for approximately 6-8 weeks before gentle progression of weight-bearing.  We will leave the sutures at this time given her risk factors of rheumatoid arthritis as well as smoking.  Patient will follow-up in 1 week for suture removal and wound check.    Fred Romero MD  [facility] Orthopaedic Surgery  10/24/2022 10:56 AM

## 2022-10-24 NOTE — PROGRESS NOTES
ATTENDING ADDENDUM    Sharlene Tong  was evaluated at the time of the encounter with Dr Romero PGY5.  HPI, PE and treatment plan was reviewed. Treatment plan was reasonable and necessary. Imaging was reviewed at the time of visit.     I was present during critical or key resident-provided service and procedure portions of the visit.

## 2022-10-24 NOTE — PROGRESS NOTES
ATTENDING ADDENDUM    Sharlene Tong  was evaluated at the time of the encounter with Dr Romero PGY5.  HPI, PE and treatment plan was reviewed. Treatment plan was reasonable and necessary. Imaging was reviewed at the time of visit.     I was present during critical or key resident-provided service and procedure portions of the visit.    The risks, benefits, outcomes, and alternatives of conservative vs operative management were  discussed with the patient in clinic today. Informed consent was obtained for ORIF left pilon fracture

## 2022-10-31 ENCOUNTER — OFFICE VISIT (OUTPATIENT)
Dept: ORTHOPEDICS | Facility: CLINIC | Age: 32
End: 2022-10-31
Payer: MEDICAID

## 2022-10-31 VITALS — HEIGHT: 62 IN | WEIGHT: 179 LBS | BODY MASS INDEX: 32.94 KG/M2

## 2022-10-31 DIAGNOSIS — S82.872D CLOSED DISPLACED PILON FRACTURE OF LEFT TIBIA WITH ROUTINE HEALING, SUBSEQUENT ENCOUNTER: Primary | ICD-10-CM

## 2022-10-31 PROCEDURE — 99213 OFFICE O/P EST LOW 20 MIN: CPT | Mod: PBBFAC

## 2022-10-31 PROCEDURE — 99024 PR POST-OP FOLLOW-UP VISIT: ICD-10-PCS | Mod: ,,, | Performed by: ORTHOPAEDIC SURGERY

## 2022-10-31 PROCEDURE — 1159F PR MEDICATION LIST DOCUMENTED IN MEDICAL RECORD: ICD-10-PCS | Mod: CPTII,,, | Performed by: ORTHOPAEDIC SURGERY

## 2022-10-31 PROCEDURE — 1159F MED LIST DOCD IN RCRD: CPT | Mod: CPTII,,, | Performed by: ORTHOPAEDIC SURGERY

## 2022-10-31 PROCEDURE — 99024 POSTOP FOLLOW-UP VISIT: CPT | Mod: ,,, | Performed by: ORTHOPAEDIC SURGERY

## 2022-10-31 RX ORDER — MELOXICAM 15 MG/1
15 TABLET ORAL DAILY
Qty: 30 TABLET | Refills: 0 | Status: SHIPPED | OUTPATIENT
Start: 2022-10-31 | End: 2023-03-09 | Stop reason: SDUPTHER

## 2022-10-31 RX ORDER — ACETAMINOPHEN 500 MG
1000 TABLET ORAL 3 TIMES DAILY
Qty: 84 TABLET | Refills: 0 | Status: SHIPPED | OUTPATIENT
Start: 2022-10-31 | End: 2022-11-14

## 2022-10-31 NOTE — PROGRESS NOTES
[facility] Orthopaedic Surgery Clinic Note    In brief, Sharlene Tong is a 32 y.o. female s/p ORIF L pilon 10/11/22      Patient doing relatively well.  Pain is gradually improving.  Still having some intermittent swelling.  No issues with her incision.  Has been providing local wound care.  No numbness or tingling.  No associated symptoms of infection.  Has been compliant with nonweightbearing.    PMH:   Past Medical History:   Diagnosis Date    Arthritis        PSH:   Past Surgical History:   Procedure Laterality Date    OPEN REDUCTION AND INTERNAL FIXATION (ORIF) OF PILON FRACTURE Left 10/11/2022    Procedure: ORIF, FRACTURE, PILON;  Surgeon: Momo Childs MD;  Location: [facility];  Service: Orthopedics;  Laterality: Left;       SH:   Social History     Socioeconomic History    Marital status: Single   Tobacco Use    Smoking status: Every Day     Packs/day: 0.50     Years: 11.00     Pack years: 5.50     Types: Cigarettes    Smokeless tobacco: Never   Substance and Sexual Activity    Alcohol use: No     Comment: occasionally    Drug use: No       FH: No family history on file.    Allergies: Review of patient's allergies indicates:  No Known Allergies    ROS:  Constitutional- no fever, fatigue, weakness  Eye- no vision loss, eye redness, drainage, or pain  ENMT- no sore throat, ear pain, sinus pain/congestion, nasal congestion/drainage  Respiratory- no cough, wheezing, or shortness of breath  CV- no chest pain, no palpitations, no edema  GI- no N/V/D; no abdominal pain    Physical Exam:  There were no vitals filed for this visit.    General: NAD  Cardio: RRR by peripheral pulse  Pulm: Normal WOB on room air, symmetric chest rise  Abd: Soft, NT/ND    MSK:    Surgical incisions well healed   Nylon suture in place   No surrounding erythema induration   Some residual swelling most pronounced dorsum of the foot   Motor intact TA/GS/EHL/FHL   Sensation intact to light touch distally  DP palpable; digits  perfused    Imaging:   Radiographs demonstrate near anatomic alignment of the left tibial plafond; ankle mortise intact; no evidence of hardware failure loosening    Assessment:  32 y.o. female s/p ORIF L mikayla 10/11/22    Suture removal today in clinic.  Discussed wound care.  Follow-up in 3 weeks.  Nonweightbearing.  Radiographs at next clinic follow-up appointment.  Discussed ice and elevation.  Gentle range of motion exercises for the left ankle.  Follow-up if issues arise sooner.    Fred Romero MD  [facility] Orthopaedic Surgery  10/31/2022 10:56 AM

## 2022-11-21 ENCOUNTER — HOSPITAL ENCOUNTER (OUTPATIENT)
Dept: RADIOLOGY | Facility: HOSPITAL | Age: 32
Discharge: HOME | End: 2022-11-21
Attending: STUDENT IN AN ORGANIZED HEALTH CARE EDUCATION/TRAINING PROGRAM
Payer: MEDICAID

## 2022-11-21 ENCOUNTER — OFFICE VISIT (OUTPATIENT)
Dept: ORTHOPEDICS | Facility: CLINIC | Age: 32
End: 2022-11-21
Payer: MEDICAID

## 2022-11-21 VITALS — HEIGHT: 62 IN | BODY MASS INDEX: 32.94 KG/M2 | WEIGHT: 179 LBS

## 2022-11-21 DIAGNOSIS — S82.872D CLOSED DISPLACED PILON FRACTURE OF LEFT TIBIA WITH ROUTINE HEALING, SUBSEQUENT ENCOUNTER: ICD-10-CM

## 2022-11-21 DIAGNOSIS — S82.872D CLOSED DISPLACED PILON FRACTURE OF LEFT TIBIA WITH ROUTINE HEALING, SUBSEQUENT ENCOUNTER: Primary | ICD-10-CM

## 2022-11-21 PROCEDURE — 1160F PR REVIEW ALL MEDS BY PRESCRIBER/CLIN PHARMACIST DOCUMENTED: ICD-10-PCS | Mod: CPTII,,, | Performed by: ORTHOPAEDIC SURGERY

## 2022-11-21 PROCEDURE — 1160F RVW MEDS BY RX/DR IN RCRD: CPT | Mod: CPTII,,, | Performed by: ORTHOPAEDIC SURGERY

## 2022-11-21 PROCEDURE — 99024 POSTOP FOLLOW-UP VISIT: CPT | Mod: ,,, | Performed by: ORTHOPAEDIC SURGERY

## 2022-11-21 PROCEDURE — 99213 OFFICE O/P EST LOW 20 MIN: CPT | Mod: PBBFAC

## 2022-11-21 PROCEDURE — 1159F MED LIST DOCD IN RCRD: CPT | Mod: CPTII,,, | Performed by: ORTHOPAEDIC SURGERY

## 2022-11-21 PROCEDURE — 99024 PR POST-OP FOLLOW-UP VISIT: ICD-10-PCS | Mod: ,,, | Performed by: ORTHOPAEDIC SURGERY

## 2022-11-21 PROCEDURE — 3008F PR BODY MASS INDEX (BMI) DOCUMENTED: ICD-10-PCS | Mod: CPTII,,, | Performed by: ORTHOPAEDIC SURGERY

## 2022-11-21 PROCEDURE — 73610 X-RAY EXAM OF ANKLE: CPT | Mod: TC,LT

## 2022-11-21 PROCEDURE — 1159F PR MEDICATION LIST DOCUMENTED IN MEDICAL RECORD: ICD-10-PCS | Mod: CPTII,,, | Performed by: ORTHOPAEDIC SURGERY

## 2022-11-21 PROCEDURE — 3008F BODY MASS INDEX DOCD: CPT | Mod: CPTII,,, | Performed by: ORTHOPAEDIC SURGERY

## 2022-11-21 NOTE — PROGRESS NOTES
[facility]  Established Patient Office Visit  11/21/2022       Patient ID: Sharlene Tong  YOB: 1990  MRN: [id number]    Diagnosis:    The encounter diagnosis was Closed displaced pilon fracture of left tibia with routine healing, subsequent encounter.     Procedure:     Orif, Fracture, Pilon - Left on 10/11/2022      Chief Complaint: Pain of the Left Ankle      Sharlene Tong is a 32 y.o. female who presents for follow up treatment of the above mentioned diagnosis. The patient's last visit with me was on 10/31/2022.  Patient reports she had been doing very well since her last clinic visit.  He says her pain is improving in the left ankle.  She is been compliant with nonweightbearing and cam boot wear.  Transitioned to over-the-counter medication for pain.      Past Medical History:    Past Medical History:   Diagnosis Date    Arthritis      Past Surgical History:   Procedure Laterality Date    OPEN REDUCTION AND INTERNAL FIXATION (ORIF) OF PILON FRACTURE Left 10/11/2022    Procedure: ORIF, FRACTURE, PILON;  Surgeon: Momo Childs MD;  Location: [facility];  Service: Orthopedics;  Laterality: Left;     History reviewed. No pertinent family history.  Social History     Socioeconomic History    Marital status: Single   Tobacco Use    Smoking status: Every Day     Packs/day: 0.50     Years: 11.00     Pack years: 5.50     Types: Cigarettes    Smokeless tobacco: Never   Substance and Sexual Activity    Alcohol use: No     Comment: occasionally    Drug use: No     Medication List with Changes/Refills   Current Medications    ASPIRIN (ECOTRIN) 81 MG EC TABLET    Take 1 tablet (81 mg total) by mouth 2 (two) times a day.    MEDROXYPROGESTERONE (DEPO-PROVERA) 150 MG/ML INJECTION        MELOXICAM (MOBIC) 15 MG TABLET    Take 1 tablet (15 mg total) by mouth once daily.    ONDANSETRON (ZOFRAN) 4 MG TABLET    Take 1 tablet (4 mg total) by mouth 2 (two) times  daily as needed for Nausea.    OXYCODONE (ROXICODONE) 5 MG IMMEDIATE RELEASE TABLET    Take 1 tablet (5 mg total) by mouth every 6 (six) hours as needed for Pain.     Review of patient's allergies indicates:  No Known Allergies    ROS:    Body mass index is 32.74 kg/m².  GENERAL: Well appearing, appropriate for stated age, no acute distress.  CARDIOVASCULAR: Pulses regular by peripheral palpation.  PULMONARY: Respirations are even and non-labored.  NEURO: Awake, alert, and oriented x 3.  PSYCH: Mood & affect are appropriate.  HEENT: Head is normocephalic and atraumatic.    Physical Exam:    Left leg: Patient is neurovascularly intact distally in the left leg.  Her wounds are clean dry and intact no signs of infection.  She is limited range of motion left ankle secondary to immobilization.  She continues to have some swelling at the ankle joint itself.  Strength testing deferred.    Imaging:    Relevant imaging results reviewed and interpreted by me, discussed with the patient and / or family today.  X-ray of the left ankle performed today reviewed patient demonstrates a healing P1 fracture with near anatomic reduction of the joint surface and appropriate position of hardware.    Assessment and Plan:    Sharlene Tong is a 32 y.o. female seen in the office today for The encounter diagnosis was Closed displaced pilon fracture of left tibia with routine healing, subsequent encounter..  Overall the patient is progressing as expected now 6 weeks status post open reduction internal fixation of left pilon fracture.  Patient will gradually transition to weight-bearing as tolerated over the course of the next 2 weeks.  If she is comfortable with that she may transition out of her Cam boot over the subsequent 2 weeks.  I will see the patient back in 6 weeks' time for repeat clinical examination and imaging.  In the meantime we are going to start him in formal physical therapy to work on range of motion of the  ankle.      Orders Placed This Encounter    X-Ray Ankle Complete Left

## 2022-11-22 DIAGNOSIS — R87.810 CERVICAL HIGH RISK HUMAN PAPILLOMAVIRUS (HPV) DNA TEST POSITIVE: Primary | ICD-10-CM

## 2023-01-04 ENCOUNTER — HOSPITAL ENCOUNTER (OUTPATIENT)
Dept: RADIOLOGY | Facility: HOSPITAL | Age: 33
Discharge: HOME | End: 2023-01-04
Attending: FAMILY MEDICINE
Payer: MEDICAID

## 2023-01-04 ENCOUNTER — OFFICE VISIT (OUTPATIENT)
Dept: ORTHOPEDICS | Facility: CLINIC | Age: 33
End: 2023-01-04
Payer: MEDICAID

## 2023-01-04 VITALS — WEIGHT: 179 LBS | HEIGHT: 62 IN | BODY MASS INDEX: 32.94 KG/M2

## 2023-01-04 DIAGNOSIS — S82.852S TRIMALLEOLAR FRACTURE OF ANKLE, CLOSED, LEFT, SEQUELA: ICD-10-CM

## 2023-01-04 DIAGNOSIS — M25.572 CHRONIC PAIN OF LEFT ANKLE: ICD-10-CM

## 2023-01-04 DIAGNOSIS — G89.29 CHRONIC PAIN OF LEFT ANKLE: Primary | ICD-10-CM

## 2023-01-04 DIAGNOSIS — G89.29 CHRONIC PAIN OF LEFT ANKLE: ICD-10-CM

## 2023-01-04 DIAGNOSIS — M25.572 CHRONIC PAIN OF LEFT ANKLE: Primary | ICD-10-CM

## 2023-01-04 PROCEDURE — 1159F MED LIST DOCD IN RCRD: CPT | Mod: CPTII,,, | Performed by: SPECIALIST

## 2023-01-04 PROCEDURE — 3008F BODY MASS INDEX DOCD: CPT | Mod: CPTII,,, | Performed by: SPECIALIST

## 2023-01-04 PROCEDURE — 3008F PR BODY MASS INDEX (BMI) DOCUMENTED: ICD-10-PCS | Mod: CPTII,,, | Performed by: SPECIALIST

## 2023-01-04 PROCEDURE — 1159F PR MEDICATION LIST DOCUMENTED IN MEDICAL RECORD: ICD-10-PCS | Mod: CPTII,,, | Performed by: SPECIALIST

## 2023-01-04 PROCEDURE — 73610 X-RAY EXAM OF ANKLE: CPT | Mod: TC,LT

## 2023-01-04 PROCEDURE — 99024 POSTOP FOLLOW-UP VISIT: CPT | Mod: ,,, | Performed by: SPECIALIST

## 2023-01-04 PROCEDURE — 99213 OFFICE O/P EST LOW 20 MIN: CPT | Mod: PBBFAC

## 2023-01-04 PROCEDURE — 99024 PR POST-OP FOLLOW-UP VISIT: ICD-10-PCS | Mod: ,,, | Performed by: SPECIALIST

## 2023-01-04 RX ORDER — KETOROLAC TROMETHAMINE 10 MG/1
10 TABLET, FILM COATED ORAL EVERY 6 HOURS
Qty: 20 TABLET | Refills: 0 | Status: SHIPPED | OUTPATIENT
Start: 2023-01-04 | End: 2023-01-09

## 2023-01-04 NOTE — PROGRESS NOTES
[facility]  Established Patient Office Visit  01/04/2023       Patient ID: Sharlene Tong  YOB: 1990  MRN: [id number]    Diagnosis:  32-year-old female status post left pilon fracture ORIF on 10/11/2022     Procedure:     Orif, Fracture, Pilon - Left on 10/11/2022      Chief Complaint: Pain of the Left Ankle      patient is now 3 months out from the above-stated surgery.  She is slowly progressing back to weight-bearing as tolerated although she still wears a Cam boot and uses crutches to get around.  She has not yet fully tolerating weight-bearing.  She has not been complaining of significant pain around her ankle, she says she has been working on range of motion and is able to the dorsiflex to neutral.  She has started experiencing some swelling as appropriate with extended weight-bearing.  All of her wounds have healed completely and well with no concern for infection at this time.  Denies fever chills chest pain shortness of breath.      Past Medical History:    Past Medical History:   Diagnosis Date    Arthritis      Past Surgical History:   Procedure Laterality Date    OPEN REDUCTION AND INTERNAL FIXATION (ORIF) OF PILON FRACTURE Left 10/11/2022    Procedure: ORIF, FRACTURE, PILON;  Surgeon: Momo Childs MD;  Location: [facility];  Service: Orthopedics;  Laterality: Left;     No family history on file.  Social History     Socioeconomic History    Marital status: Single   Tobacco Use    Smoking status: Every Day     Packs/day: 0.50     Years: 11.00     Pack years: 5.50     Types: Cigarettes    Smokeless tobacco: Never   Substance and Sexual Activity    Alcohol use: No     Comment: occasionally    Drug use: No     Medication List with Changes/Refills   New Medications    KETOROLAC (TORADOL) 10 MG TABLET    Take 1 tablet (10 mg total) by mouth every 6 (six) hours. for 5 days    MISCELLANEOUS MEDICAL SUPPLY KIT    1 Units by Misc.(Non-Drug; Combo Route)  route once. for 1 dose   Current Medications    ASPIRIN (ECOTRIN) 81 MG EC TABLET    Take 1 tablet (81 mg total) by mouth 2 (two) times a day.    MEDROXYPROGESTERONE (DEPO-PROVERA) 150 MG/ML INJECTION        MELOXICAM (MOBIC) 15 MG TABLET    Take 1 tablet (15 mg total) by mouth once daily.    ONDANSETRON (ZOFRAN) 4 MG TABLET    Take 1 tablet (4 mg total) by mouth 2 (two) times daily as needed for Nausea.    OXYCODONE (ROXICODONE) 5 MG IMMEDIATE RELEASE TABLET    Take 1 tablet (5 mg total) by mouth every 6 (six) hours as needed for Pain.     Review of patient's allergies indicates:  No Known Allergies    ROS:    Body mass index is 32.74 kg/m².  GENERAL: Well appearing, appropriate for stated age, no acute distress.  CARDIOVASCULAR: Pulses regular by peripheral palpation.  PULMONARY: Respirations are even and non-labored.  NEURO: Awake, alert, and oriented x 3.  PSYCH: Mood & affect are appropriate.  HEENT: Head is normocephalic and atraumatic.    Physical Exam:    Left leg: Patient is neurovascularly intact distally in the left leg.  Her wounds are clean dry and intact no signs of infection.  Fully healed at today's visit.  She has some ankle stiffness, pain with terminal dorsiflexion can just get to neutral  she has some diffuse edema of her foot..  Good plantar flexion strength.    Imaging:    Relevant imaging results reviewed and interpreted by me, discussed with the patient and / or family today.  X-ray of the left ankle performed today reviewed patient demonstrates a healing pilon fracture with near anatomic reduction of the joint surface and appropriate position of hardware.    Assessment and Plan:  32-year-old female status post left pilon fracture ORIF on 10/11/2022     -patient is now 3 months out, doing well, slow to progress to full weight-bearing after this significant injury   -I encouraged her to start fully weight-bearing in her Cam boot her, her x-rays look good today and her exam is also benign    -she can transition into a Cam boot with full weight-bearing, slowly transition back to all supportive footwear over the next 1 month   -she can continue doing home physical therapy which she believes is really improving her dorsiflexion   -follow-up in 6 weeks to check on her progress    Dr. Max Kraus  [facility] Orthopaedic Surgery  Pager: [phone]

## 2023-03-01 ENCOUNTER — HOSPITAL ENCOUNTER (OUTPATIENT)
Dept: RADIOLOGY | Facility: HOSPITAL | Age: 33
Discharge: HOME | End: 2023-03-01
Attending: ORTHOPAEDIC SURGERY
Payer: MEDICAID

## 2023-03-01 ENCOUNTER — OFFICE VISIT (OUTPATIENT)
Dept: ORTHOPEDICS | Facility: CLINIC | Age: 33
End: 2023-03-01
Payer: MEDICAID

## 2023-03-01 VITALS
DIASTOLIC BLOOD PRESSURE: 71 MMHG | HEIGHT: 62 IN | WEIGHT: 179 LBS | SYSTOLIC BLOOD PRESSURE: 109 MMHG | HEART RATE: 104 BPM | BODY MASS INDEX: 32.94 KG/M2 | OXYGEN SATURATION: 98 % | RESPIRATION RATE: 17 BRPM

## 2023-03-01 DIAGNOSIS — M25.572 ACUTE LEFT ANKLE PAIN: Primary | ICD-10-CM

## 2023-03-01 DIAGNOSIS — M25.572 ACUTE LEFT ANKLE PAIN: ICD-10-CM

## 2023-03-01 PROCEDURE — 3008F PR BODY MASS INDEX (BMI) DOCUMENTED: ICD-10-PCS | Mod: CPTII,,, | Performed by: SPECIALIST

## 2023-03-01 PROCEDURE — 99213 OFFICE O/P EST LOW 20 MIN: CPT | Mod: S$PBB,,, | Performed by: SPECIALIST

## 2023-03-01 PROCEDURE — 3078F PR MOST RECENT DIASTOLIC BLOOD PRESSURE < 80 MM HG: ICD-10-PCS | Mod: CPTII,,, | Performed by: SPECIALIST

## 2023-03-01 PROCEDURE — 1160F RVW MEDS BY RX/DR IN RCRD: CPT | Mod: CPTII,,, | Performed by: SPECIALIST

## 2023-03-01 PROCEDURE — 1160F PR REVIEW ALL MEDS BY PRESCRIBER/CLIN PHARMACIST DOCUMENTED: ICD-10-PCS | Mod: CPTII,,, | Performed by: SPECIALIST

## 2023-03-01 PROCEDURE — 1159F PR MEDICATION LIST DOCUMENTED IN MEDICAL RECORD: ICD-10-PCS | Mod: CPTII,,, | Performed by: SPECIALIST

## 2023-03-01 PROCEDURE — 73610 X-RAY EXAM OF ANKLE: CPT | Mod: TC,LT

## 2023-03-01 PROCEDURE — 3074F PR MOST RECENT SYSTOLIC BLOOD PRESSURE < 130 MM HG: ICD-10-PCS | Mod: CPTII,,, | Performed by: SPECIALIST

## 2023-03-01 PROCEDURE — 99213 OFFICE O/P EST LOW 20 MIN: CPT | Mod: PBBFAC

## 2023-03-01 PROCEDURE — 99213 PR OFFICE/OUTPT VISIT, EST, LEVL III, 20-29 MIN: ICD-10-PCS | Mod: S$PBB,,, | Performed by: SPECIALIST

## 2023-03-01 PROCEDURE — 3008F BODY MASS INDEX DOCD: CPT | Mod: CPTII,,, | Performed by: SPECIALIST

## 2023-03-01 PROCEDURE — 3074F SYST BP LT 130 MM HG: CPT | Mod: CPTII,,, | Performed by: SPECIALIST

## 2023-03-01 PROCEDURE — 1159F MED LIST DOCD IN RCRD: CPT | Mod: CPTII,,, | Performed by: SPECIALIST

## 2023-03-01 PROCEDURE — 3078F DIAST BP <80 MM HG: CPT | Mod: CPTII,,, | Performed by: SPECIALIST

## 2023-03-01 RX ORDER — PREDNISONE 20 MG/1
TABLET ORAL
COMMUNITY
Start: 2022-12-12 | End: 2023-03-09

## 2023-03-01 RX ORDER — KETOROLAC TROMETHAMINE 10 MG/1
10 TABLET, FILM COATED ORAL EVERY 6 HOURS
COMMUNITY
End: 2023-03-09

## 2023-03-01 RX ORDER — CYCLOBENZAPRINE HCL 5 MG
TABLET ORAL
COMMUNITY
Start: 2022-10-21 | End: 2024-01-22

## 2023-03-01 RX ORDER — HYDROXYCHLOROQUINE SULFATE 200 MG/1
400 TABLET, FILM COATED ORAL DAILY
COMMUNITY
End: 2023-03-09 | Stop reason: SDUPTHER

## 2023-03-01 NOTE — PROGRESS NOTES
Chief Complaint:   Chief Complaint   Patient presents with    Left Ankle - Pain         History of present illness:    This is a 32 y.o. year old female who complains of pa about bearing weight she is still weight uses a boot.  in in her left ankle after having had a plafond fracture.  She would ORIF in October.    She presently points to the medial aspect of her ankle over the medial malleolus as a source of her pain and she is very tentative      Past Medical History:   Diagnosis Date    Arthritis        Past Surgical History:   Procedure Laterality Date    OPEN REDUCTION AND INTERNAL FIXATION (ORIF) OF PILON FRACTURE Left 10/11/2022    Procedure: ORIF, FRACTURE, PILON;  Surgeon: Momo Childs MD;  Location: [facility];  Service: Orthopedics;  Laterality: Left;       Current Outpatient Medications   Medication Instructions    aspirin (ECOTRIN) 81 mg, Oral, 2 times daily    cyclobenzaprine (FLEXERIL) 5 MG tablet TAKE 1 TABLET BY MOUTH THREE TIMES A DAY AS NEEDED FOR MUSCLE SPASMS    hydrOXYchloroQUINE (PLAQUENIL) 200 mg, Oral, Daily    ketorolac (TORADOL) 10 mg, Oral, Every 6 hours    medroxyPROGESTERone (DEPO-PROVERA) 150 mg/mL injection No dose, route, or frequency recorded.    meloxicam (MOBIC) 15 mg, Oral, Daily    ondansetron (ZOFRAN) 4 mg, Oral, 2 times daily PRN    oxyCODONE (ROXICODONE) 5 mg, Oral, Every 6 hours PRN    predniSONE (DELTASONE) 20 MG tablet TAKE 1 TABLET BY MOUTH IN THE MORNING AND 1 TABLET BEFORE BEDTIME. DO ALL THIS FOR 5 DAYS.        Review of patient's allergies indicates:  No Known Allergies    History reviewed. No pertinent family history.        Review of Systems:    Constitution:   Denies chills, fever, and sweats.  HENT:   Denies headaches or blurry vision.  Cardiovascular:  Denies chest pain or irregular heart beat.  Respiratory:   Denies cough or shortness of breath.  Gastrointestinal:  Denies abdominal pain, nausea, or vomiting.  Musculoskeletal:   Denies muscle  "cramps.  Neurological:   Denies dizziness or focal weakness.  Psychiatric/Behavior: Normal mental status.  Hematology/Lymph:  Denies bleeding problem or easy bruising/bleeding.  Skin:    Denies rash or suspicious lesions.    Examination:    Vital Signs:    Vitals:    03/01/23 0955 03/01/23 0956   BP: 109/71    Pulse: 104    Resp: 17    SpO2: 98%    Weight: 81.2 kg (179 lb)    Height: 5' 2" (1.575 m)    PainSc:    4       Body mass index is 32.74 kg/m².    Constitution:   Well-developed, well nourished patient in no acute distress.  Neurological:   Alert and oriented x 3 and cooperative to examination.     Psychiatric/Behavior: Normal mental status.  Respiratory:   No shortness of breath.  Eyes:    Extraoccular muscles intact  Skin:    No scars, rash or suspicious lesions.    Musculoskeletal Exam :   Examination of the patient's left ankle she is healed surgical scars anteriorly and laterally .  She is dorsiflexion of 30° plantar flexion of 30° she is good inversion and eversion of the ankle though she has tenderness whenever you palpate her ankle primarily medially.  There is no excessive swelling.      She is had a complete ankle series three views which reveal appropriately placed hardware and good reduction of her ankle mortise    She does have some mild osteopenia.     Assessment: Acute left ankle pain  -     X-Ray Ankle Complete Left; Future; Expected date: 03/01/2023        Plan:  Plan is to have her continue to put full weight on her ankle and return in 1 month evaluation      DISCLAIMER: This note may have been dictated using voice recognition software and may contain grammatical errors.     NOTE: Consult report sent to referring provider via EPIC EMR.  "

## 2023-03-03 DIAGNOSIS — S82.899A ANKLE FRACTURE: Primary | ICD-10-CM

## 2023-03-13 ENCOUNTER — CLINICAL SUPPORT (OUTPATIENT)
Dept: REHABILITATION | Facility: HOSPITAL | Age: 33
End: 2023-03-13
Payer: MEDICAID

## 2023-03-13 DIAGNOSIS — R26.9 GAIT ABNORMALITY: ICD-10-CM

## 2023-03-13 DIAGNOSIS — R29.898 DECREASED STRENGTH OF LOWER EXTREMITY: ICD-10-CM

## 2023-03-13 DIAGNOSIS — M25.572 LEFT ANKLE PAIN, UNSPECIFIED CHRONICITY: ICD-10-CM

## 2023-03-13 DIAGNOSIS — S82.872A CLOSED DISPLACED PILON FRACTURE OF LEFT TIBIA, INITIAL ENCOUNTER: Primary | ICD-10-CM

## 2023-03-13 DIAGNOSIS — R26.89 IMPAIRMENT OF BALANCE: ICD-10-CM

## 2023-03-13 DIAGNOSIS — M25.672 DECREASED RANGE OF MOTION OF LEFT ANKLE: ICD-10-CM

## 2023-03-13 PROBLEM — M25.673 DECREASED ROM OF ANKLE: Status: ACTIVE | Noted: 2023-03-13

## 2023-03-13 PROCEDURE — 97162 PT EVAL MOD COMPLEX 30 MIN: CPT

## 2023-03-13 NOTE — PLAN OF CARE
[facility]   Physical Therapy Initial Evaluation     Date: 3/13/2023   Name: Sharlene Tong  Clinic Number: [id number]    Therapy Diagnosis:   Encounter Diagnoses   Name Primary?    Closed displaced pilon fracture of left tibia, initial encounter Yes    Decreased range of motion of left ankle     Decreased strength of lower extremity     Impairment of balance     Gait abnormality     Left ankle pain, unspecified chronicity      Physician: Referring, Unknown    Physician Orders: PT eval and treat   Medical Diagnosis from Referral: S82.872A closed displaced pilon fracture of left tibia   Evaluation Date: 3/13/2023  Authorization Period Expiration: TBD  Plan of Care Expiration: TBD  Reassessment / POC Due: TBD  Visit # / Visits authorized: TBD     Precautions: Standard and Fall     Time In: 1520  Time Out: 1605  Total Appointment Time (timed & untimed codes): 45 minutes    SUBJECTIVE     Date of onset / History of current condition - Sharlene reports: that she sustained a fracture dislocation of the Left ankle while playing volleyball at a family reion (09/24/2022). She was taken to [facility] where she underwent closed reduction. After a follow up assessment at [facility] she underwent surgery for internal fixation of her tibia/fibula fracture on 10/10/2022. Sharlene states that she received home health therapy for 12 weeks with only minimal progression. During her recent check up her ortho physician referred her to outpatient physical therapy to progress weight bearing and strengthening. Sharlene states that she is applying approximately 25% of her weight on her left foot during ambulation with crutches.       FOTO ankle  Survey    Therapist reviewed FOTO scores for Sharlene Tong on 3/13/2023.   FOTO documents entered into [employer] - see Media section.     Functional Status Score: Foto will be completed at first visit.          Falls: none     Imaging, CT  scan films and x-rays @ [facility]     Prior Therapy: Home health x 12 weeks   Social History:  lives with their family( 2 teen daughters)  Occupation: working at [employer] prior to her injury   Prior Level of Function: IND  Current Level of Function: IND with most ADLs. Moderate assist with cooking and cleaning. Maximal assist with IADL.     Pain:  Current 5/10, worst 9/10, best 2/10   Location: left ankles   Description: Aching, Tingling, Numb, and Sharp  Aggravating Factors: Walking  Easing Factors: rest    Patients goals: patient would like to return to normal ambulation without assistive device and return to PLOF      Medical History:   Past Medical History:   Diagnosis Date    Arthritis        Surgical History:   Sharlene Tong  has a past surgical history that includes Open reduction and internal fixation (ORIF) of pilon fracture (Left, 10/11/2022).    Medications:   Sharlene has a current medication list which includes the following prescription(s): aspirin, cyclobenzaprine, hydroxychloroquine, medroxyprogesterone, and meloxicam, and the following Facility-Administered Medications: lidocaine (pf) 10 mg/ml (1%).    Allergies:   Review of patient's allergies indicates:   Allergen Reactions    Aloe vera Itching    Hydrocodone-acetaminophen Itching          OBJECTIVE     Palpation: Tenderness around Left medial malleolus . Healed incision along medial and lateral left ankle.     Posture: WNL     Sensation : Light touch intact     PROM Right Left Comment   DF: WNL degrees 7 degrees    PF: WNL  degrees 35 degrees    Eversion: WNL degrees 5 degrees    Inversion: WNL degrees 10 degrees    *pain     Strength:    MMT Right Left   Hip Flexors 5/5 4/5   Hip Extensors 5/5 4/5   Hip Adductors 5/5 4/5   Hip Abductors 5/5 4/5   Knee Flexors 5/5 3/5   Knee Extensors 5/5 5/5   Ankle Dorsiflexors 5/5 2/5   Ankle Plantarflexors 5/5 2/5     Flexibility:                                RIGHT             LEFT  Hamstrings      Piriformis     Rectus      Quadriceps     Gastroc  Tight        Gait  Ambulation: MODERATE IND on level and unlevel surfaces.   Distance ambulated: 200+ feet   Displays the following gait deviations: decreased stride and step height on Right due to decreased stance phase on Left  Ascending stairs: step to  pattern, B hand rail, supervision  Descending stairs: step to  pattern, B hand rail, supervision    Balance  Static standing: good   Dynamic standing: fair   Single Limb Stance:  Right=0 seconds   Left=0 seconds  Tandem stance: not tested   MCTSIB: 4/4  Tinnetti : not tested     Special Test:  5XSTS: 17.5  seconds   TUG:  not tested   Gait Speed: .18 meters/ seconds     TREATMENT     Total Treatment time (time-based codes) separate from Evaluation: 5 minutes      Sharlene received the treatments listed below:      therapeutic exercises to develop strength, endurance, ROM, and flexibility for 5 minutes including:    Therapeutic Exercise Grid     Exercise 1  Exercise 2  Exercise 3  Exercise 4    Exercise :    Gastroc stretch  Theraband:   DF/PF  INV/EVR           Repetition/Time :    3 x 20sec   X 15           Resist or Assist :       Red            Comment :                Done :                                PATIENT EDUCATION AND HOME EXERCISES     Education provided / Written Home Exercises Provided  Exercises and stretches demonstrated and initiated, written HEP issued, educated pt on importance and benefits of exercises and stretches, and encouraged pt to continue with HEP daily. Pt voiced understanding    ASSESSMENT     Sharlene is a 32 y.o. female referred to outpatient Physical Therapy with a medical diagnosis of S82.872A closed displaced pilon fracture of left tibia. Patient presents with   Encounter Diagnoses   Name Primary?    Closed displaced pilon fracture of left tibia, initial encounter Yes    Decreased range of motion of left ankle     Decreased strength of lower extremity     Impairment of balance      Gait abnormality     Left ankle pain, unspecified chronicity        Patient prognosis is Excellent.   Patient will benefit from skilled outpatient Physical Therapy to address the deficits stated above and in the chart below, provide patient /family education, and to maximize patientt's level of independence.     Plan of care discussed with patient: Yes  Patient's spiritual, cultural and educational needs considered and patient is agreeable to the plan of care and goals as stated below:     Anticipated Barriers for therapy: Rheumatoid arthritis     Goals:  Short Term Goals (4 Weeks):   1. Patient will be independent with home exercise program to supplement therapy in improving functional mobility.  2. Patient will improve dorsiflexion active range of motion with knee extended to 5 degrees to improve step through during gait.   3. Patient will improve single leg balance duration to >/= 15 seconds  to improve balance and functional mobility .   4. Patient will ambulate with standard cane on all surfaces x 200 +feet .     Long Term Goals (8 Weeks):   1. Patient will improve FOTO score to </= 60% limited to decrease perceived limitation with mobility.   2. Patient will improve impaired lower extremity strength to >/= 4/5 to improve strength for functional tasks.  3. Patient will improve dorsiflexion active range of motion with knee extended to 10 degrees to improve gait pattern .  4. Patient will improve single leg balance duration to 30 seconds  to improve functional mobility and balance .   5. Patient will report no pain during ambulation to improve gait pattern     PLAN     Outpatient Physical Therapy 3 times weekly for 8 weeks to include the following interventions: Gait Training, Patient Education, Therapeutic Activities, and Therapeutic Exercise.     Keith Guerin, PT

## 2023-03-20 DIAGNOSIS — S82.872A CLOSED DISPLACED PILON FRACTURE OF LEFT TIBIA: Primary | ICD-10-CM

## 2023-03-24 ENCOUNTER — CLINICAL SUPPORT (OUTPATIENT)
Dept: REHABILITATION | Facility: HOSPITAL | Age: 33
End: 2023-03-24
Payer: MEDICAID

## 2023-03-24 DIAGNOSIS — M25.672 DECREASED RANGE OF MOTION OF LEFT ANKLE: Primary | ICD-10-CM

## 2023-03-24 DIAGNOSIS — M25.572 LEFT ANKLE PAIN, UNSPECIFIED CHRONICITY: ICD-10-CM

## 2023-03-24 DIAGNOSIS — R26.89 IMPAIRMENT OF BALANCE: ICD-10-CM

## 2023-03-24 DIAGNOSIS — R26.9 GAIT ABNORMALITY: ICD-10-CM

## 2023-03-24 DIAGNOSIS — R29.898 DECREASED STRENGTH OF LOWER EXTREMITY: ICD-10-CM

## 2023-03-24 PROCEDURE — 97110 THERAPEUTIC EXERCISES: CPT

## 2023-03-24 NOTE — PROGRESS NOTES
[facility]   Physical Therapy Treatment Note     Name: Sharlene Ellington [facility]  Clinic Number: [id number]    Therapy Diagnosis:   Encounter Diagnoses   Name Primary?    Decreased range of motion of left ankle Yes    Decreased strength of lower extremity     Impairment of balance     Gait abnormality     Left ankle pain, unspecified chronicity      Physician: Donny, [facility]    Visit Date: 3/24/2023    Physician Orders: PT eval and treat   Medical Diagnosis from Referral: S82.872A closed displaced pilon fracture of left tibia   Evaluation Date: 3/13/2023  Authorization Period Expiration: 5/12/23  Plan of Care Expiration: 4/21/23  Reassessment / POC Due: week of 4/10/23  Visit # / Visits authorized: 1/12    PTA Visit #: 0/5     Time In: 1303  Time Out: 1335  Total Billable Time: 32 minutes    SUBJECTIVE     Pt reports: that she has been working on exercises at home. Anxious to start therapy.   She was compliant with home exercise program.  Response to previous treatment: n/a  Functional change: n/a    Pain: 4/10  Location: left ankles     OBJECTIVE     Objective Measures updated at progress report unless specified.     Treatment     Sharlene received the treatments listed below:      therapeutic exercises to develop strength, endurance, ROM, and flexibility for 32 minutes including:  Therapeutic Exercise   Therapeutic Exercise Grid     Exercise 1  Exercise 2  Exercise 3  Exercise 4    Exercise :    Left ankle towel stretch  Ankle alphabet  Theraband:  DF/PF  Inv/Evr Toe curls with towel    Repetition/Time :    3 x 15 sec   2 trials    x 10    2 x 1 min       Resist or Assist :    No resistance   No resistance   Yellow Theraband   No resistance     Comment :                Done :    YES   YES  YES  YES                   Exercise 5  Exercise 6  Exercise 7  Exercise 8    Exercise :    Seated toe raises Seated heel raises  BAPS  STS    Repetition/Time :    x 10    x 10    x 10    x 10       Resist or Assist :    No resistance   No resistance   No resistance   No resistance     Comment :     Upper and lower case     Done :    YES    YES  YES  YES                    Exercise 9  Exercise 10  Exercise 11  Exercise 12    Exercise :    SLS Gait Train NUSTEP    Repetition/Time :    x 10    2 laps    5 min       Resist or Assist :    No resistance   No resistance   No resistance      Comment :       With single stick          Done :    YES    YES  YES                        Exercise 13 Exercise 14  Exercise 15  Exercise 16   Exercise :          Repetition/Time :          Resist or Assist :          Comment :                  Done :                                  Exercise 17 Exercise 18 Exercise 19 Exercise 20    Exercise :          Repetition/Time :          Resist or Assist :          Comment :                  Done :                                    Patient Education and Home Exercises     Home Exercises Provided and Patient Education Provided     Education provided:   - HEP - ankle RANGE OF MOTION     Written Home Exercises Provided: Patient instructed to cont prior HEP. Exercises were reviewed and Sharlene was able to demonstrate them prior to the end of the session.  Sharlene demonstrated good  understanding of the education provided. See EMR under Patient Instructions for exercises provided during therapy sessions    ASSESSMENT     Sharlene tolerated treatment fair for todays visit with minimal-moderate reports of discomfort in left ankle during exercise performance. Patient required moderate verbal and tactile cues to ensure proper exercise execution and gait pattern. Patient did show improvement in weight transfer to left foot during gait training activity.     Sharlene Is progressing well towards her goals.   Pt prognosis is Good.     Pt will continue to benefit from skilled outpatient physical therapy to address the deficits listed in the problem list box on initial evaluation, provide pt/family  education and to maximize pt's level of independence in the home and community environment.     Pt's spiritual, cultural and educational needs considered and pt agreeable to plan of care and goals.     Anticipated barriers to physical therapy: Left tibia/fibula ORIF    Goals:   Short Term Goals (4 Weeks):   1. Patient will be independent with home exercise program to supplement therapy in improving functional mobility.  2. Patient will improve dorsiflexion active range of motion with knee extended to 5 degrees to improve step through during gait.   3. Patient will improve single leg balance duration to >/= 15 seconds  to improve balance and functional mobility .   4. Patient will ambulate with standard cane on all surfaces x 200 +feet .      Long Term Goals (8 Weeks):   1. Patient will improve FOTO score to </= 60% limited to decrease perceived limitation with mobility.   2. Patient will improve impaired lower extremity strength to >/= 4/5 to improve strength for functional tasks.  3. Patient will improve dorsiflexion active range of motion with knee extended to 10 degrees to improve gait pattern .  4. Patient will improve single leg balance duration to 30 seconds  to improve functional mobility and balance .   5. Patient will report no pain during ambulation to improve gait pattern        PLAN     Outpatient Physical Therapy 3 times weekly for 4 weeks to include the following interventions: Gait Training, Patient Education, Therapeutic Activities, and Therapeutic Exercise.     Keith Guerin, PT

## 2023-03-28 ENCOUNTER — CLINICAL SUPPORT (OUTPATIENT)
Dept: REHABILITATION | Facility: HOSPITAL | Age: 33
End: 2023-03-28
Payer: MEDICAID

## 2023-03-28 DIAGNOSIS — M25.672 DECREASED RANGE OF MOTION OF LEFT ANKLE: Primary | ICD-10-CM

## 2023-03-28 DIAGNOSIS — R29.898 DECREASED STRENGTH OF LOWER EXTREMITY: ICD-10-CM

## 2023-03-28 DIAGNOSIS — R26.9 GAIT ABNORMALITY: ICD-10-CM

## 2023-03-28 DIAGNOSIS — R26.89 IMPAIRMENT OF BALANCE: ICD-10-CM

## 2023-03-28 DIAGNOSIS — M25.572 LEFT ANKLE PAIN, UNSPECIFIED CHRONICITY: ICD-10-CM

## 2023-03-28 PROCEDURE — 97110 THERAPEUTIC EXERCISES: CPT

## 2023-03-28 NOTE — PROGRESS NOTES
[facility]   Physical Therapy Treatment Note     Name: Sharlene Ellington [facility] Number: [id number]    Therapy Diagnosis:   Encounter Diagnoses   Name Primary?    Decreased range of motion of left ankle Yes    Decreased strength of lower extremity     Impairment of balance     Gait abnormality     Left ankle pain, unspecified chronicity      Physician: Donny, [facility]    Visit Date: 3/28/2023    Physician Orders: PT eval and treat   Medical Diagnosis from Referral: S82.872A closed displaced pilon fracture of left tibia   Evaluation Date: 3/13/2023  Authorization Period Expiration: 5/12/23  Plan of Care Expiration: 4/21/23  Reassessment / POC Due: week of 4/10/23  Visit # / Visits authorized: 1/12    PTA Visit #: 0/5     Time In: 1510  Time Out: 1545  Total Billable Time: 35 minutes    SUBJECTIVE     Pt reports: minor soreness following initial treatment session.   She was compliant with home exercise program.  Response to previous treatment: good   Functional change: none     Pain: 4/10  Location: left ankles (medial malleolus )     OBJECTIVE     Objective Measures updated at progress report unless specified.     Treatment     Sharlene received the treatments listed below:      therapeutic exercises to develop strength, endurance, ROM, and flexibility for 32 minutes including:  Therapeutic Exercise   Therapeutic Exercise Grid     Exercise 1  Exercise 2  Exercise 3  Exercise 4    Exercise :    Left ankle towel stretch  Ankle alphabet  Theraband:  DF/PF  Inv/Evr Toe curls with towel    Repetition/Time :    3 x 15 sec   2 trials    x 10    2 x 1 min       Resist or Assist :    No resistance   No resistance   Yellow Theraband   No resistance     Comment :                Done :    YES   YES  YES  YES                   Exercise 5  Exercise 6  Exercise 7  Exercise 8    Exercise :    Seated toe raises Seated heel raises  BAPS  STS    Repetition/Time :    x 20    x 20    x 10    x 10       Resist or Assist :    No resistance   No resistance   No resistance   No resistance     Comment :     Upper and lower case  On foam    Done :    YES    YES  YES  YES                    Exercise 9  Exercise 10  Exercise 11  Exercise 12    Exercise :    SLS Gait Train NUSTEP Semi-tandem   Repetition/Time :    x 10    2 laps    5 min    3 x 30sec   Resist or Assist :    No resistance   No resistance   No resistance      Comment :       With single stick          Done :    N     YES  N   Yes                       Exercise 13 Exercise 14  Exercise 15  Exercise 16   Exercise :          Repetition/Time :          Resist or Assist :          Comment :                  Done :                                  Exercise 17 Exercise 18 Exercise 19 Exercise 20    Exercise :          Repetition/Time :          Resist or Assist :          Comment :                  Done :                                    Patient Education and Home Exercises     Home Exercises Provided and Patient Education Provided     Education provided:   - HEP - ankle RANGE OF MOTION     Written Home Exercises Provided: Patient instructed to cont prior HEP. Exercises were reviewed and Sharlene was able to demonstrate them prior to the end of the session.  Sharlene demonstrated good  understanding of the education provided. See EMR under Patient Instructions for exercises provided during therapy sessions    ASSESSMENT     Sharlene tolerated treatment fair today. Noted increased sensitivity to touch around scar and medial malleolus. Patient demonstrates decreased RANGE OF MOTION and mobility in her left metatarsals. Increased reps for heel and toe raises with good tolerance.     Sharlene Is progressing well towards her goals.   Pt prognosis is Good.     Pt will continue to benefit from skilled outpatient physical therapy to address the deficits listed in the problem list box on initial evaluation, provide pt/family education and to maximize pt's level of independence  in the home and community environment.     Pt's spiritual, cultural and educational needs considered and pt agreeable to plan of care and goals.     Anticipated barriers to physical therapy: Left tibia/fibula ORIF    Goals:   Short Term Goals (4 Weeks):   1. Patient will be independent with home exercise program to supplement therapy in improving functional mobility.  2. Patient will improve dorsiflexion active range of motion with knee extended to 5 degrees to improve step through during gait.   3. Patient will improve single leg balance duration to >/= 15 seconds  to improve balance and functional mobility .   4. Patient will ambulate with standard cane on all surfaces x 200 +feet .      Long Term Goals (8 Weeks):   1. Patient will improve FOTO score to </= 60% limited to decrease perceived limitation with mobility.   2. Patient will improve impaired lower extremity strength to >/= 4/5 to improve strength for functional tasks.  3. Patient will improve dorsiflexion active range of motion with knee extended to 10 degrees to improve gait pattern .  4. Patient will improve single leg balance duration to 30 seconds  to improve functional mobility and balance .   5. Patient will report no pain during ambulation to improve gait pattern        PLAN     Outpatient Physical Therapy 3 times weekly for 4 weeks to include the following interventions: Gait Training, Patient Education, Therapeutic Activities, and Therapeutic Exercise.     Keith Guerin, PT

## 2023-03-30 ENCOUNTER — CLINICAL SUPPORT (OUTPATIENT)
Dept: REHABILITATION | Facility: HOSPITAL | Age: 33
End: 2023-03-30
Payer: MEDICAID

## 2023-03-30 DIAGNOSIS — M25.672 DECREASED RANGE OF MOTION OF LEFT ANKLE: Primary | ICD-10-CM

## 2023-03-30 DIAGNOSIS — R26.9 GAIT ABNORMALITY: ICD-10-CM

## 2023-03-30 DIAGNOSIS — R26.89 IMPAIRMENT OF BALANCE: ICD-10-CM

## 2023-03-30 DIAGNOSIS — M25.572 LEFT ANKLE PAIN, UNSPECIFIED CHRONICITY: ICD-10-CM

## 2023-03-30 DIAGNOSIS — R29.898 DECREASED STRENGTH OF LOWER EXTREMITY: ICD-10-CM

## 2023-03-30 PROCEDURE — 97110 THERAPEUTIC EXERCISES: CPT | Mod: CQ

## 2023-03-30 NOTE — PROGRESS NOTES
[facility]   Physical Therapy Treatment Note     Name: Sharlene Ellington [facility]  Clinic Number: [id number]    Therapy Diagnosis:   Encounter Diagnoses   Name Primary?    Decreased range of motion of left ankle Yes    Decreased strength of lower extremity     Impairment of balance     Gait abnormality     Left ankle pain, unspecified chronicity      Physician: Donny, [facility]    Visit Date: 3/30/2023    Physician Orders: PT eval and treat   Medical Diagnosis from Referral: S82.872A closed displaced pilon fracture of left tibia   Evaluation Date: 3/13/2023  Authorization Period Expiration: 5/12/23  Plan of Care Expiration: 4/21/23  Reassessment / POC Due: week of 4/10/23  Visit # / Visits authorized: 2/12    PTA Visit #: 1/5     Time In: 1500  Time Out: 1538  Total Billable Time: 38 minutes    SUBJECTIVE     Pt reports: minor soreness following last treatment session. States she could not walk with only one crutch at home.  She was compliant with home exercise program.  Response to previous treatment: good   Functional change: none     Pain: 4/10  Location: left ankles (medial malleolus )     OBJECTIVE     Objective Measures updated at progress report unless specified.     Treatment     Sharlene received the treatments listed below:      therapeutic exercises to develop strength, endurance, ROM, and flexibility for 38 minutes including:  Therapeutic Exercise   Therapeutic Exercise Grid     Exercise 1  Exercise 2  Exercise 3  Exercise 4    Exercise :    Left ankle towel stretch  Ankle alphabet  Theraband:  DF/PF  Inv/Evr Toe curls with towel    Repetition/Time :    3 x 15 sec   2 trials    x 10    2 x 1 min       Resist or Assist :    No resistance   No resistance   Yellow Theraband   No resistance     Comment :                Done :    YES   YES  YES  YES                   Exercise 5  Exercise 6  Exercise 7  Exercise 8    Exercise :    Seated toe raises Seated heel raises  BAPS  STS     Repetition/Time :    x 20    x 20    x 10    x 10      Resist or Assist :    No resistance   No resistance   No resistance   No resistance     Comment :     Upper and lower case CW and CCW On foam    Done :    YES    YES  YES  YES                    Exercise 9  Exercise 10  Exercise 11  Exercise 12    Exercise :    SLS Gait Train NUSTEP Semi-tandem   Repetition/Time :    x 10    2 laps    5 min    3 x 30sec   Resist or Assist :    No resistance   No resistance   No resistance      Comment :       With single stick          Done :    N     YES  N   Yes                       Exercise 13 Exercise 14  Exercise 15  Exercise 16   Exercise :          Repetition/Time :          Resist or Assist :          Comment :                  Done :                                  Exercise 17 Exercise 18 Exercise 19 Exercise 20    Exercise :          Repetition/Time :          Resist or Assist :          Comment :                  Done :                                    Patient Education and Home Exercises     Home Exercises Provided and Patient Education Provided     Education provided:   - HEP - ankle RANGE OF MOTION     Written Home Exercises Provided: Patient instructed to cont prior HEP. Exercises were reviewed and Sharlene was able to demonstrate them prior to the end of the session.  Sharlene demonstrated good  understanding of the education provided. See EMR under Patient Instructions for exercises provided during therapy sessions    ASSESSMENT     Sharlene tolerated treatment fair today. Noted increased sensitivity to touch around scar and medial malleolus. Patient demonstrates decreased RANGE OF MOTION and mobility in her left metatarsals. Increased distance to 60 feet with walking stick. Verbal and tactile cues required for form.     Sharlene Is progressing well towards her goals.   Pt prognosis is Good.     Pt will continue to benefit from skilled outpatient physical therapy to address the deficits listed in the problem list  box on initial evaluation, provide pt/family education and to maximize pt's level of independence in the home and community environment.     Pt's spiritual, cultural and educational needs considered and pt agreeable to plan of care and goals.     Anticipated barriers to physical therapy: Left tibia/fibula ORIF    Goals:   Short Term Goals (4 Weeks):   1. Patient will be independent with home exercise program to supplement therapy in improving functional mobility.  2. Patient will improve dorsiflexion active range of motion with knee extended to 5 degrees to improve step through during gait.   3. Patient will improve single leg balance duration to >/= 15 seconds  to improve balance and functional mobility .   4. Patient will ambulate with standard cane on all surfaces x 200 +feet .      Long Term Goals (8 Weeks):   1. Patient will improve FOTO score to </= 60% limited to decrease perceived limitation with mobility.   2. Patient will improve impaired lower extremity strength to >/= 4/5 to improve strength for functional tasks.  3. Patient will improve dorsiflexion active range of motion with knee extended to 10 degrees to improve gait pattern .  4. Patient will improve single leg balance duration to 30 seconds  to improve functional mobility and balance .   5. Patient will report no pain during ambulation to improve gait pattern        PLAN     Outpatient Physical Therapy 3 times weekly for 4 weeks to include the following interventions: Gait Training, Patient Education, Therapeutic Activities, and Therapeutic Exercise.     Oleksandr Arceo, PTA

## 2023-03-31 ENCOUNTER — CLINICAL SUPPORT (OUTPATIENT)
Dept: REHABILITATION | Facility: HOSPITAL | Age: 33
End: 2023-03-31
Payer: MEDICAID

## 2023-03-31 DIAGNOSIS — M25.672 DECREASED RANGE OF MOTION OF LEFT ANKLE: Primary | ICD-10-CM

## 2023-03-31 DIAGNOSIS — R26.89 IMPAIRMENT OF BALANCE: ICD-10-CM

## 2023-03-31 DIAGNOSIS — R26.9 GAIT ABNORMALITY: ICD-10-CM

## 2023-03-31 DIAGNOSIS — R29.898 DECREASED STRENGTH OF LOWER EXTREMITY: ICD-10-CM

## 2023-03-31 DIAGNOSIS — M25.572 LEFT ANKLE PAIN, UNSPECIFIED CHRONICITY: ICD-10-CM

## 2023-03-31 PROCEDURE — 97110 THERAPEUTIC EXERCISES: CPT | Mod: CQ

## 2023-03-31 NOTE — PROGRESS NOTES
[facility]   Physical Therapy Treatment Note     Name: Sharlene Ellington [facility]  Clinic Number: [id number]    Therapy Diagnosis:   Encounter Diagnoses   Name Primary?    Decreased range of motion of left ankle Yes    Decreased strength of lower extremity     Impairment of balance     Gait abnormality     Left ankle pain, unspecified chronicity      Physician: Donny, [facility]    Visit Date: 3/31/2023    Physician Orders: PT eval and treat   Medical Diagnosis from Referral: S82.872A closed displaced pilon fracture of left tibia   Evaluation Date: 3/13/2023  Authorization Period Expiration: 5/12/23  Plan of Care Expiration: 4/21/23  Reassessment / POC Due: week of 4/10/23  Visit # / Visits authorized: 3/12    PTA Visit #: 2/5     Time In: 1500  Time Out: 1540  Total Billable Time: 40 minutes    SUBJECTIVE     Pt reports: she was sore again after last session, but it is better today.   She was compliant with home exercise program.  Response to previous treatment: good   Functional change: none     Pain: 3/10  Location: left ankles (medial malleolus )     OBJECTIVE     Objective Measures updated at progress report unless specified.     Treatment     Sharlene received the treatments listed below:      therapeutic exercises to develop strength, endurance, ROM, and flexibility for 40 minutes including:  Therapeutic Exercise   Therapeutic Exercise Grid     Exercise 1  Exercise 2  Exercise 3  Exercise 4    Exercise :    Left ankle towel stretch  Ankle alphabet  Theraband:  DF/PF  Inv/Evr Toe curls with towel    Repetition/Time :    3 x 15 sec   2 trials    x 10    2 x 1 min       Resist or Assist :    No resistance   No resistance   Yellow Theraband   No resistance     Comment :                Done :    YES   YES  YES  YES                   Exercise 5  Exercise 6  Exercise 7  Exercise 8    Exercise :    Seated toe raises Seated heel raises  BAPS  STS    Repetition/Time :    x 20    x 20    x 10     x 10      Resist or Assist :    No resistance   No resistance   No resistance   No resistance     Comment :     Upper and lower case CW and CCW On foam    Done :    YES    YES  YES  YES                    Exercise 9  Exercise 10  Exercise 11  Exercise 12    Exercise :    SLS Gait Train NUSTEP Semi-tandem   Repetition/Time :    x 10    2 laps    5 min    3 x 30sec   Resist or Assist :    No resistance   No resistance   No resistance      Comment :       With single stick          Done :    N     YES  N   Yes                       Exercise 13 Exercise 14  Exercise 15  Exercise 16   Exercise :          Repetition/Time :          Resist or Assist :          Comment :                  Done :                                  Exercise 17 Exercise 18 Exercise 19 Exercise 20    Exercise :          Repetition/Time :          Resist or Assist :          Comment :                  Done :                                    Patient Education and Home Exercises     Home Exercises Provided and Patient Education Provided     Education provided:   - HEP - ankle RANGE OF MOTION     Written Home Exercises Provided: Patient instructed to cont prior HEP. Exercises were reviewed and Sharlene was able to demonstrate them prior to the end of the session.  Sharlene demonstrated good  understanding of the education provided. See EMR under Patient Instructions for exercises provided during therapy sessions    ASSESSMENT     Sharlene tolerated treatment fair today. Performed AAROM to L ankle to promote full range of motion.  Increased distance to 2 laps feet with single crutch. Verbal and tactile cues required for posture on crutches and proper gait pattern.     Sharlene Is progressing well towards her goals.   Pt prognosis is Good.     Pt will continue to benefit from skilled outpatient physical therapy to address the deficits listed in the problem list box on initial evaluation, provide pt/family education and to maximize pt's level of independence  in the home and community environment.     Pt's spiritual, cultural and educational needs considered and pt agreeable to plan of care and goals.     Anticipated barriers to physical therapy: Left tibia/fibula ORIF    Goals:   Short Term Goals (4 Weeks):   1. Patient will be independent with home exercise program to supplement therapy in improving functional mobility.  2. Patient will improve dorsiflexion active range of motion with knee extended to 5 degrees to improve step through during gait.   3. Patient will improve single leg balance duration to >/= 15 seconds  to improve balance and functional mobility .   4. Patient will ambulate with standard cane on all surfaces x 200 +feet .      Long Term Goals (8 Weeks):   1. Patient will improve FOTO score to </= 60% limited to decrease perceived limitation with mobility.   2. Patient will improve impaired lower extremity strength to >/= 4/5 to improve strength for functional tasks.  3. Patient will improve dorsiflexion active range of motion with knee extended to 10 degrees to improve gait pattern .  4. Patient will improve single leg balance duration to 30 seconds  to improve functional mobility and balance .   5. Patient will report no pain during ambulation to improve gait pattern        PLAN     Outpatient Physical Therapy 3 times weekly for 4 weeks to include the following interventions: Gait Training, Patient Education, Therapeutic Activities, and Therapeutic Exercise.     Oleksandr Arceo, PTA

## 2023-04-03 ENCOUNTER — OFFICE VISIT (OUTPATIENT)
Dept: ORTHOPEDICS | Facility: CLINIC | Age: 33
End: 2023-04-03
Payer: MEDICAID

## 2023-04-03 ENCOUNTER — HOSPITAL ENCOUNTER (OUTPATIENT)
Dept: RADIOLOGY | Facility: HOSPITAL | Age: 33
Discharge: HOME | End: 2023-04-03
Attending: ORTHOPAEDIC SURGERY
Payer: MEDICAID

## 2023-04-03 ENCOUNTER — HOSPITAL ENCOUNTER (OUTPATIENT)
Dept: RADIOLOGY | Facility: HOSPITAL | Age: 33
Discharge: HOME | End: 2023-04-03
Attending: INTERNAL MEDICINE
Payer: MEDICAID

## 2023-04-03 DIAGNOSIS — M05.79 RHEUMATOID ARTHRITIS INVOLVING MULTIPLE SITES WITH POSITIVE RHEUMATOID FACTOR: ICD-10-CM

## 2023-04-03 DIAGNOSIS — G89.29 CHRONIC PAIN OF LEFT ANKLE: ICD-10-CM

## 2023-04-03 DIAGNOSIS — G89.29 CHRONIC PAIN OF LEFT ANKLE: Primary | ICD-10-CM

## 2023-04-03 DIAGNOSIS — M25.572 CHRONIC PAIN OF LEFT ANKLE: ICD-10-CM

## 2023-04-03 DIAGNOSIS — M25.572 CHRONIC PAIN OF LEFT ANKLE: Primary | ICD-10-CM

## 2023-04-03 PROCEDURE — 99213 PR OFFICE/OUTPT VISIT, EST, LEVL III, 20-29 MIN: ICD-10-PCS | Mod: S$PBB,,, | Performed by: SPECIALIST

## 2023-04-03 PROCEDURE — 77080 DXA BONE DENSITY AXIAL: CPT | Mod: TC

## 2023-04-03 PROCEDURE — 1160F PR REVIEW ALL MEDS BY PRESCRIBER/CLIN PHARMACIST DOCUMENTED: ICD-10-PCS | Mod: CPTII,,, | Performed by: SPECIALIST

## 2023-04-03 PROCEDURE — 1159F MED LIST DOCD IN RCRD: CPT | Mod: CPTII,,, | Performed by: SPECIALIST

## 2023-04-03 PROCEDURE — 99213 OFFICE O/P EST LOW 20 MIN: CPT | Mod: S$PBB,,, | Performed by: SPECIALIST

## 2023-04-03 PROCEDURE — 1159F PR MEDICATION LIST DOCUMENTED IN MEDICAL RECORD: ICD-10-PCS | Mod: CPTII,,, | Performed by: SPECIALIST

## 2023-04-03 PROCEDURE — 99213 OFFICE O/P EST LOW 20 MIN: CPT | Mod: PBBFAC,25

## 2023-04-03 PROCEDURE — 73610 X-RAY EXAM OF ANKLE: CPT | Mod: TC,LT

## 2023-04-03 PROCEDURE — 1160F RVW MEDS BY RX/DR IN RCRD: CPT | Mod: CPTII,,, | Performed by: SPECIALIST

## 2023-04-03 NOTE — PROGRESS NOTES
[facility]  Established Patient Office Visit  04/03/2023       Patient ID: Sharlene Tong  YOB: 1990  MRN: [id number]    Diagnosis:  32-year-old female status post left pilon fracture ORIF on 10/11/2022     Procedure:     Orif, Fracture, Pilon - Left on 10/11/2022      Chief Complaint: Pain of the Left Ankle      She is 6 months out.  She went in and out of a boot.  Has not been walking on it in a regular shoe up until 1 or 2 weeks ago.  So she is still using crutches.  She is in therapy and this seems to be helping now that she is doing therapy at the physical therapy facility.  I think it is giving her access the more modalities.  Still having pain generalized throughout the foot and ankle not 1 specific spot.  More pain localized at the medial side of the ankle and at the forefoot.      Past Medical History:    Past Medical History:   Diagnosis Date    Arthritis      Past Surgical History:   Procedure Laterality Date    OPEN REDUCTION AND INTERNAL FIXATION (ORIF) OF PILON FRACTURE Left 10/11/2022    Procedure: ORIF, FRACTURE, PILON;  Surgeon: Momo Childs MD;  Location: [facility];  Service: Orthopedics;  Laterality: Left;     History reviewed. No pertinent family history.  Social History     Socioeconomic History    Marital status: Single   Tobacco Use    Smoking status: Every Day     Packs/day: 0.50     Years: 11.00     Pack years: 5.50     Types: Cigarettes    Smokeless tobacco: Never   Substance and Sexual Activity    Alcohol use: No     Comment: occasionally    Drug use: No     Medication List with Changes/Refills   Current Medications    ASPIRIN (ECOTRIN) 81 MG EC TABLET    Take 1 tablet (81 mg total) by mouth 2 (two) times a day.    CYCLOBENZAPRINE (FLEXERIL) 5 MG TABLET    TAKE 1 TABLET BY MOUTH THREE TIMES A DAY AS NEEDED FOR MUSCLE SPASMS    ERGOCALCIFEROL (ERGOCALCIFEROL) 50,000 UNIT CAP    Take 1 capsule (50,000 Units total) by mouth every 7  days.    HYDROXYCHLOROQUINE (PLAQUENIL) 200 MG TABLET    Take 2 tablets (400 mg total) by mouth once daily.    MEDROXYPROGESTERONE (DEPO-PROVERA) 150 MG/ML INJECTION        MELOXICAM (MOBIC) 15 MG TABLET    Take 1 tablet (15 mg total) by mouth once daily.     Review of patient's allergies indicates:   Allergen Reactions    Aloe vera Itching    Hydrocodone-acetaminophen Itching       ROS:    There is no height or weight on file to calculate BMI.  GENERAL: Well appearing, appropriate for stated age, no acute distress.  CARDIOVASCULAR: Pulses regular by peripheral palpation.  PULMONARY: Respirations are even and non-labored.  NEURO: Awake, alert, and oriented x 3.  PSYCH: Mood & affect are appropriate.  HEENT: Head is normocephalic and atraumatic.    Physical Exam:    Left leg: Patient is neurovascularly intact distally in the left leg.  Her wounds are clean dry and intact no signs of infection.  Fully healed.  She has about 10° dorsiflexion with knee extended and 15 with reflex pain is located throughout the forefoot and at the medial ankle but not 1 specific point.  Is little bit of weakness in plantar flexion and dorsiflexion of the ankle      Imaging:    Relevant imaging results reviewed and interpreted by me, discussed with the patient and / or family today.  X-ray of the left ankle performed today reviewed patient demonstrates a healed pilon fracture with near anatomic reduction of the joint surface and appropriate position of hardware.    Assessment and Plan:  32-year-old female status post left pilon fracture ORIF on 10/11/2022     -at this point her x-rays look great I think a lot of her issues are from disuse, she needs to continue the physical therapy at the physical therapy facility and continued exercises   -continue using a regular shoe and work to get rid of the crutches when she feels like she can   -continue physical therapy   -follow-up as needed    .sss

## 2023-04-05 ENCOUNTER — CLINICAL SUPPORT (OUTPATIENT)
Dept: REHABILITATION | Facility: HOSPITAL | Age: 33
End: 2023-04-05
Payer: MEDICAID

## 2023-04-05 DIAGNOSIS — R26.89 IMPAIRMENT OF BALANCE: ICD-10-CM

## 2023-04-05 DIAGNOSIS — R26.9 GAIT ABNORMALITY: ICD-10-CM

## 2023-04-05 DIAGNOSIS — M25.672 DECREASED RANGE OF MOTION OF LEFT ANKLE: Primary | ICD-10-CM

## 2023-04-05 DIAGNOSIS — M25.572 LEFT ANKLE PAIN, UNSPECIFIED CHRONICITY: ICD-10-CM

## 2023-04-05 DIAGNOSIS — R29.898 DECREASED STRENGTH OF LOWER EXTREMITY: ICD-10-CM

## 2023-04-05 PROCEDURE — 97110 THERAPEUTIC EXERCISES: CPT

## 2023-04-05 NOTE — PROGRESS NOTES
[facility]   Physical Therapy Treatment Note     Name: Sharlene Ellington [facility]  Clinic Number: [id number]    Therapy Diagnosis:   Encounter Diagnoses   Name Primary?    Decreased range of motion of left ankle Yes    Decreased strength of lower extremity     Impairment of balance     Gait abnormality     Left ankle pain, unspecified chronicity        Physician: Donny, [facility]    Visit Date: 4/5/2023    Physician Orders: PT eval and treat   Medical Diagnosis from Referral: S82.872A closed displaced pilon fracture of left tibia   Evaluation Date: 3/13/2023  Authorization Period Expiration: 5/12/23  Plan of Care Expiration: 4/21/23  Reassessment / POC Due: week of 4/10/23  Visit # / Visits authorized: 5/12    PTA Visit #: 2/5     Time In: 1535  Time Out: 1605  Total Billable Time: 30 minutes    SUBJECTIVE     Pt reports: she has been trying to walk normal but still has a lot of trouble   She was compliant with home exercise program.  Response to previous treatment: good   Functional change: none     Pain: 3/10 with Meloxicam  Location: left ankles (medial malleolus )     OBJECTIVE     Objective Measures updated at progress report unless specified.     Treatment     Sharlene received the treatments listed below:      therapeutic exercises to develop strength, endurance, ROM, and flexibility for 30 minutes including:  Therapeutic Exercise   Therapeutic Exercise Grid     Exercise 1  Exercise 2  Exercise 3  Exercise 4    Exercise :    Left ankle towel stretch  Ankle alphabet  Theraband:  DF/PF  Inv/Evr Toe curls with towel    Repetition/Time :    3 x 30 sec   X 1    2 x 10    2 x 1 min       Resist or Assist :    No resistance   No resistance   Yellow Theraband   No resistance     Comment :           Picked up marbles vs  towel toe curls     Done :    YES   YES  YES  YES                   Exercise 5  Exercise 6  Exercise 7  Exercise 8    Exercise :    Seated toe raises Seated heel raises  BAPS   STS    Repetition/Time :    x 20    x 20    x 10    x 10      Resist or Assist :    No resistance   No resistance   No resistance   No resistance     Comment :     Upper and lower case CW and CCW On foam    Done :    no   no no no                   Exercise 9  Exercise 10  Exercise 11  Exercise 12    Exercise :    SLS Gait Training NUSTEP Semi-tandem   Repetition/Time :    x 10    2 laps    5 min    3 x 30sec   Resist or Assist :    No resistance   No resistance   No resistance      Comment :       With hurrycane       Done :    No     YES  No   no                    Exercise 13 Exercise 14  Exercise 15  Exercise 16   Exercise :    R toe taps      Repetition/Time :    10      Resist or Assist :    6 inch step      Comment :    Force FWB on L foot              Done :    yes                              Patient Education and Home Exercises     Home Exercises Provided and Patient Education Provided     Education provided:   - HEP - ankle RANGE OF MOTION     Written Home Exercises Provided: Patient instructed to cont prior HEP. Exercises were reviewed and Sharlene was able to demonstrate them prior to the end of the session.  Sharlene demonstrated good  understanding of the education provided. See EMR under Patient Instructions for exercises provided during therapy sessions    ASSESSMENT     Pt continues to have a lot of trouble normalizing gait pattern and maintaining neutral trunk alignment due to L ankle pain and decreased confidence in ankle with cues given to facilitate midline and hurrycane used vs single crutch. Very limited L foot movement. Added R toe-taps to POC to force FWB on L ankle to improve confidence     Sharlene Is progressing well towards her goals.   Pt prognosis is Good.     Pt will continue to benefit from skilled outpatient physical therapy to address the deficits listed in the problem list box on initial evaluation, provide pt/family education and to maximize pt's level of independence in the home and  community environment.     Pt's spiritual, cultural and educational needs considered and pt agreeable to plan of care and goals.     Anticipated barriers to physical therapy: Left tibia/fibula ORIF    Goals:   Short Term Goals (4 Weeks):   1. Patient will be independent with home exercise program to supplement therapy in improving functional mobility.  2. Patient will improve dorsiflexion active range of motion with knee extended to 5 degrees to improve step through during gait.   3. Patient will improve single leg balance duration to >/= 15 seconds  to improve balance and functional mobility .   4. Patient will ambulate with standard cane on all surfaces x 200 +feet .      Long Term Goals (8 Weeks):   1. Patient will improve FOTO score to </= 60% limited to decrease perceived limitation with mobility.   2. Patient will improve impaired lower extremity strength to >/= 4/5 to improve strength for functional tasks.  3. Patient will improve dorsiflexion active range of motion with knee extended to 10 degrees to improve gait pattern .  4. Patient will improve single leg balance duration to 30 seconds  to improve functional mobility and balance .   5. Patient will report no pain during ambulation to improve gait pattern        PLAN     Outpatient Physical Therapy 3 times weekly for 4 weeks to include the following interventions: Gait Training, Patient Education, Therapeutic Activities, and Therapeutic Exercise.     Ananya Lora, PT

## 2023-04-06 ENCOUNTER — CLINICAL SUPPORT (OUTPATIENT)
Dept: REHABILITATION | Facility: HOSPITAL | Age: 33
End: 2023-04-06
Payer: MEDICAID

## 2023-04-06 DIAGNOSIS — R26.89 IMPAIRMENT OF BALANCE: ICD-10-CM

## 2023-04-06 DIAGNOSIS — M25.672 DECREASED RANGE OF MOTION OF LEFT ANKLE: Primary | ICD-10-CM

## 2023-04-06 DIAGNOSIS — M25.572 LEFT ANKLE PAIN, UNSPECIFIED CHRONICITY: ICD-10-CM

## 2023-04-06 DIAGNOSIS — R29.898 DECREASED STRENGTH OF LOWER EXTREMITY: ICD-10-CM

## 2023-04-06 DIAGNOSIS — R26.9 GAIT ABNORMALITY: ICD-10-CM

## 2023-04-06 PROCEDURE — 97110 THERAPEUTIC EXERCISES: CPT | Mod: CQ

## 2023-04-06 NOTE — PROGRESS NOTES
[facility]   Physical Therapy Treatment Note     Name: Sharlene Ellington [facility] Number: [id number]    Therapy Diagnosis:   Encounter Diagnoses   Name Primary?    Decreased range of motion of left ankle Yes    Decreased strength of lower extremity     Impairment of balance     Gait abnormality     Left ankle pain, unspecified chronicity        Physician: Donny, [facility]    Visit Date: 4/6/2023    Physician Orders: PT eval and treat   Medical Diagnosis from Referral: S82.872A closed displaced pilon fracture of left tibia   Evaluation Date: 3/13/2023  Authorization Period Expiration: 5/12/23  Plan of Care Expiration: 4/21/23  Reassessment / POC Due: week of 4/10/23  Visit # / Visits authorized: 6/12    PTA Visit #: 1/5     Time In: 1545  Time Out: 1615  Total Billable Time: 30 minutes    SUBJECTIVE     Pt reports: she woke up with a great deal of pain this morning. It has subsided some, but is still very high.  She was compliant with home exercise program.  Response to previous treatment: good   Functional change: none     Pain: 5/10 with Meloxicam  Location: left ankles (medial malleolus )     OBJECTIVE     Objective Measures updated at progress report unless specified.     Treatment     Sharlene received the treatments listed below:      therapeutic exercises to develop strength, endurance, ROM, and flexibility for 30 minutes including:  Therapeutic Exercise   Therapeutic Exercise Grid     Exercise 1  Exercise 2  Exercise 3  Exercise 4    Exercise :    Left ankle towel stretch  Ankle alphabet  Theraband:  DF/PF  Inv/Evr Toe curls with towel    Repetition/Time :    3 x 30 sec   X 1    2 x 10    2 x 1 min       Resist or Assist :    No resistance   No resistance   Yellow Theraband   No resistance     Comment :           Picked up marbles vs  towel toe curls     Done :    YES   YES  YES  YES                   Exercise 5  Exercise 6  Exercise 7  Exercise 8    Exercise :    Seated toe  raises Seated heel raises  BAPS  STS    Repetition/Time :    x 20    x 20    x 10    x 10      Resist or Assist :    No resistance   No resistance   No resistance   No resistance     Comment :     Upper and lower case CW and CCW On foam    Done :    no   no no no                   Exercise 9  Exercise 10  Exercise 11  Exercise 12    Exercise :    SLS Gait Training NUSTEP Semi-tandem   Repetition/Time :    x 10    2 laps    5 min    3 x 30sec   Resist or Assist :    No resistance   No resistance   No resistance      Comment :       With hurrycane       Done :    No     YES  No   no                    Exercise 13 Exercise 14  Exercise 15  Exercise 16   Exercise :    R toe taps      Repetition/Time :    10      Resist or Assist :    6 inch step      Comment :    Force FWB on L foot              Done :    yes                              Patient Education and Home Exercises     Home Exercises Provided and Patient Education Provided     Education provided:   - HEP - ankle RANGE OF MOTION     Written Home Exercises Provided: Patient instructed to cont prior HEP. Exercises were reviewed and Sharlene was able to demonstrate them prior to the end of the session.  Sharlene demonstrated good  understanding of the education provided. See EMR under Patient Instructions for exercises provided during therapy sessions    ASSESSMENT     Pt continues to have a lot of trouble normalizing gait pattern and maintaining neutral trunk alignment due to L ankle pain and decreased confidence in ankle with cues given to facilitate midline and hand-holding used vs single crutch. Very limited L foot movement. Sharlene was limited with step length today due to increased pain.    Sharlene Is progressing well towards her goals.   Pt prognosis is Good.     Pt will continue to benefit from skilled outpatient physical therapy to address the deficits listed in the problem list box on initial evaluation, provide pt/family education and to maximize pt's level of  independence in the home and community environment.     Pt's spiritual, cultural and educational needs considered and pt agreeable to plan of care and goals.     Anticipated barriers to physical therapy: Left tibia/fibula ORIF    Goals:   Short Term Goals (4 Weeks):   1. Patient will be independent with home exercise program to supplement therapy in improving functional mobility.  2. Patient will improve dorsiflexion active range of motion with knee extended to 5 degrees to improve step through during gait.   3. Patient will improve single leg balance duration to >/= 15 seconds  to improve balance and functional mobility .   4. Patient will ambulate with standard cane on all surfaces x 200 +feet .      Long Term Goals (8 Weeks):   1. Patient will improve FOTO score to </= 60% limited to decrease perceived limitation with mobility.   2. Patient will improve impaired lower extremity strength to >/= 4/5 to improve strength for functional tasks.  3. Patient will improve dorsiflexion active range of motion with knee extended to 10 degrees to improve gait pattern .  4. Patient will improve single leg balance duration to 30 seconds  to improve functional mobility and balance .   5. Patient will report no pain during ambulation to improve gait pattern        PLAN     Outpatient Physical Therapy 3 times weekly for 4 weeks to include the following interventions: Gait Training, Patient Education, Therapeutic Activities, and Therapeutic Exercise.     Oleksandr Arceo, PTA

## 2023-04-12 ENCOUNTER — CLINICAL SUPPORT (OUTPATIENT)
Dept: REHABILITATION | Facility: HOSPITAL | Age: 33
End: 2023-04-12
Payer: MEDICAID

## 2023-04-12 DIAGNOSIS — M25.572 LEFT ANKLE PAIN, UNSPECIFIED CHRONICITY: ICD-10-CM

## 2023-04-12 DIAGNOSIS — R26.89 IMPAIRMENT OF BALANCE: ICD-10-CM

## 2023-04-12 DIAGNOSIS — R29.898 DECREASED STRENGTH OF LOWER EXTREMITY: ICD-10-CM

## 2023-04-12 DIAGNOSIS — M25.672 DECREASED RANGE OF MOTION OF LEFT ANKLE: Primary | ICD-10-CM

## 2023-04-12 DIAGNOSIS — R26.9 GAIT ABNORMALITY: ICD-10-CM

## 2023-04-12 PROCEDURE — 97110 THERAPEUTIC EXERCISES: CPT

## 2023-04-12 NOTE — PLAN OF CARE
[facility] OUTPATIENT THERAPY AND WELLNESS  Physical Therapy Plan of Care Note    Name: Sharlene Ellington [facility]  Clinic Number: [id number]    Therapy Diagnosis:   Encounter Diagnoses   Name Primary?    Decreased range of motion of left ankle Yes    Decreased strength of lower extremity     Impairment of balance     Gait abnormality     Left ankle pain, unspecified chronicity      Physician: Donny [facility]    Visit Date: 4/12/2023  Time In: 1535  Time Out: 1405  Total billable minutes: 30    Physician Orders: PT eval and treat   Medical Diagnosis from Referral: S82.872A closed displaced pilon fracture of left tibia   Evaluation Date: 3/13/2023  Authorization Period Expiration: 5/12/23  Plan of Care Expiration: 4/21/23  Reassessment / POC completed: 4/12/23  Visit # / Visits authorized: 7/12      Precautions: Standard  Functional Level Prior to Evaluation:   IND prior to injury     SUBJECTIVE     Pt reports: that she is beginning to see progress. Presents with single crutch today for ambulation. .  She was compliant with home exercise program.  Response to previous treatment: Good   Functional change: improved gait     Functional ankle  FOTO score: 34 (3/28/23)    Pain: 4/10  Location: left ankles     OBJECTIVE     Update:     Palpation: Tenderness around Left medial malleolus. Noted area of hardness that is very tender.  Healed incision along medial and lateral left ankle.      Posture: WNL      Sensation : Light touch intact      PROM Right Left Comment   DF: WNL degrees 0 degrees     PF: WNL  degrees 35 degrees     Eversion: WNL degrees 12 degrees     Inversion: WNL degrees 20 degrees     *pain      Strength:     MMT Right Left   Hip Flexors 5/5 4/5   Hip Extensors 5/5 4/5   Hip Adductors 5/5 4/5   Hip Abductors 5/5 4/5   Knee Flexors 5/5 3+/5   Knee Extensors 5/5 5/5   Ankle Dorsiflexors 5/5 2+/5   Ankle Plantarflexors 5/5 2+/5      Flexibility:                                                      RIGHT              LEFT  Hamstrings       Piriformis       Rectus        Quadriceps       Gastroc   Tight          Gait  Ambulation: MODERATE IND on level and unlevel surfaces.   Distance ambulated: 200+ feet   Displays the following gait deviations: improving  stride and step height on Right due to decreased stance phase on Left   Ascending stairs: step to  pattern, B hand rail, supervision  Descending stairs: step to  pattern, B hand rail, supervision     Balance  Static standing: good   Dynamic standing: fair +  Single Limb Stance:  Right=0 seconds          Left=0 seconds  Tandem stance: not tested   MCTSIB: 4/4  Tinnetti : not tested      Special Test:  5XSTS: 20.6  seconds   TUG:  not tested   Gait Speed: .37 meters/ seconds     Treatment:   Therapeutic Exercise Grid     Exercise 1  Exercise 2  Exercise 3  Exercise 4    Exercise :    Left ankle towel stretch  Ankle alphabet  Theraband:  DF/PF  Inv/Evr Toe curls with towel    Repetition/Time :    3 x 30 sec   X 1    2 x 10    2 x 1 min       Resist or Assist :    No resistance   No resistance   Red Theraband   No resistance     Comment :            Picked up marbles vs  towel toe curls     Done :    YES     YES                      Exercise 5  Exercise 6  Exercise 7  Exercise 8    Exercise :    Seated toe raises Seated heel raises  BAPS  STS    Repetition/Time :    x 20    x 20    x 10    x 10      Resist or Assist :    No resistance   No resistance   No resistance   No resistance     Comment :       CW and CCW On foam    Done :    YES   YES                       Exercise 9  Exercise 10  Exercise 11  Exercise 12    Exercise :    SLS Gait Training NUSTEP Semi-tandem   Repetition/Time :    x 10    2 laps    5 min    3 x 30sec   Resist or Assist :    No resistance   No resistance   No resistance       Comment :       With hurrycane        Done :         YES                          Exercise 13 Exercise 14  Exercise 15  Exercise 16   Exercise :    R toe taps         Repetition/Time :     10         Resist or Assist :    6 inch step         Comment :    Force FWB on L foot              Done :                                   ASSESSMENT     Update: Isaías is showing slow but consistent progress with therapy as demonstrated by improved left ankle AROM and Gait stability. Patient continues to have pain and soreness limiting progress. Isaías is able to complete all exercises with good effort and requires moderate verbal and minimal physical cues for muscle  targeting.     Anticipated Barriers for therapy: ORIF left ankle     Goals:    Short Term Goals (4 Weeks):   MET- 1. Patient will be independent with home exercise program to supplement therapy in improving functional mobility.  IN PROGRESS- 2. Patient will improve dorsiflexion active range of motion with knee extended to 5 degrees to improve step through during gait.   IN PROGRESS- 3. Patient will improve single leg balance duration to >/= 15 seconds  to improve balance and functional mobility .   IN PROGRESS- 4. Patient will ambulate with standard cane on all surfaces x 200 +feet .      Long Term Goals (8 Weeks):   1. Patient will improve FOTO score to </= 60% limited to decrease perceived limitation with mobility.   2. Patient will improve impaired lower extremity strength to >/= 4/5 to improve strength for functional tasks.  3. Patient will improve dorsiflexion active range of motion with knee extended to 10 degrees to improve gait pattern .  4. Patient will improve single leg balance duration to 30 seconds  to improve functional mobility and balance .   5. Patient will report no pain during ambulation to improve gait pattern     Reasons for Recertification of Therapy:   ISAÍAS continues to recover from left ankle ORIF with deficits of AROM, strength , balance, and gait stability limiting her ability to return  to PLOF.     PLAN     Recommended Treatment Plan: 3 times per week for 6 weeks:  Gait Training, Patient Education, Therapeutic  Activities, and Therapeutic Exercise  Other Recommendations: continue progressing gait to standard cane     Keith Guerin PT    I CERTIFY THE NEED FOR THESE SERVICES FURNISHED UNDER THIS PLAN OF TREATMENT AND WHILE UNDER MY CARE  Physician's comments:      Physician's Signature: ___________________________________________________

## 2023-04-14 ENCOUNTER — PROCEDURE VISIT (OUTPATIENT)
Dept: GYNECOLOGY | Facility: CLINIC | Age: 33
End: 2023-04-14
Payer: MEDICAID

## 2023-04-14 VITALS
TEMPERATURE: 98 F | DIASTOLIC BLOOD PRESSURE: 72 MMHG | SYSTOLIC BLOOD PRESSURE: 104 MMHG | HEIGHT: 64 IN | BODY MASS INDEX: 30.73 KG/M2 | WEIGHT: 180 LBS | OXYGEN SATURATION: 100 % | HEART RATE: 86 BPM | RESPIRATION RATE: 20 BRPM

## 2023-04-14 DIAGNOSIS — R87.810 CERVICAL HIGH RISK HUMAN PAPILLOMAVIRUS (HPV) DNA TEST POSITIVE: ICD-10-CM

## 2023-04-14 LAB
B-HCG UR QL: NEGATIVE
B-HCG UR QL: NEGATIVE
CTP QC/QA: YES
CTP QC/QA: YES

## 2023-04-14 PROCEDURE — 81025 URINE PREGNANCY TEST: CPT | Mod: PBBFAC

## 2023-04-14 PROCEDURE — 57500 BIOPSY OF CERVIX: CPT | Mod: PBBFAC | Performed by: STUDENT IN AN ORGANIZED HEALTH CARE EDUCATION/TRAINING PROGRAM

## 2023-04-14 PROCEDURE — 57454 BX/CURETT OF CERVIX W/SCOPE: CPT | Mod: PBBFAC | Performed by: STUDENT IN AN ORGANIZED HEALTH CARE EDUCATION/TRAINING PROGRAM

## 2023-04-14 RX ORDER — IBUPROFEN 400 MG/1
400 TABLET ORAL
Status: COMPLETED | OUTPATIENT
Start: 2023-04-14 | End: 2023-04-14

## 2023-04-14 RX ADMIN — IBUPROFEN 400 MG: 400 TABLET ORAL at 01:04

## 2023-04-17 LAB
ESTROGEN SERPL-MCNC: NORMAL PG/ML
INSULIN SERPL-ACNC: NORMAL U[IU]/ML
LAB AP CLINICAL INFORMATION: NORMAL
LAB AP GROSS DESCRIPTION: NORMAL
LAB AP REPORT FOOTNOTES: NORMAL
T3RU NFR SERPL: NORMAL %

## 2023-04-20 ENCOUNTER — CLINICAL SUPPORT (OUTPATIENT)
Dept: REHABILITATION | Facility: HOSPITAL | Age: 33
End: 2023-04-20
Payer: MEDICAID

## 2023-04-20 DIAGNOSIS — R26.89 IMPAIRMENT OF BALANCE: ICD-10-CM

## 2023-04-20 DIAGNOSIS — R29.898 DECREASED STRENGTH OF LOWER EXTREMITY: ICD-10-CM

## 2023-04-20 DIAGNOSIS — R26.9 GAIT ABNORMALITY: ICD-10-CM

## 2023-04-20 DIAGNOSIS — M25.572 LEFT ANKLE PAIN, UNSPECIFIED CHRONICITY: ICD-10-CM

## 2023-04-20 DIAGNOSIS — M25.672 DECREASED RANGE OF MOTION OF LEFT ANKLE: Primary | ICD-10-CM

## 2023-04-20 PROCEDURE — 97110 THERAPEUTIC EXERCISES: CPT | Mod: CQ

## 2023-04-20 NOTE — PROGRESS NOTES
[facility] OUTPATIENT THERAPY AND WELLNESS   Physical Therapy Treatment Note     Name: Sharlene Ellington [facility]  Clinic Number: [id number]    Therapy Diagnosis:   Encounter Diagnoses   Name Primary?    Decreased range of motion of left ankle Yes    Decreased strength of lower extremity     Impairment of balance     Gait abnormality     Left ankle pain, unspecified chronicity        Physician: Donny [facility]    Visit Date: 4/20/2023    Physician Orders: PT eval and treat   Medical Diagnosis from Referral: S82.872A closed displaced pilon fracture of left tibia   Evaluation Date: 3/13/2023  Authorization Period Expiration: 5/12/23  Plan of Care Expiration: 4/21/23  Reassessment / POC completed: 4/12/23  Visit # / Visits authorized: 8/12       PTA Visit #: 1/5     Time In: 1500  Time Out: 1530  Total Billable Time: 30 minutes    SUBJECTIVE     Pt reports: less pain today. Has had some swelling in the L ankle.  She was compliant with home exercise program.  Response to previous treatment: good   Functional change: none     Pain: 3/10 with Meloxicam  Location: left ankles (medial malleolus )     OBJECTIVE     Objective Measures updated at progress report unless specified.     Treatment     Sharlene received the treatments listed below:      therapeutic exercises to develop strength, endurance, ROM, and flexibility for 30 minutes including:  Therapeutic Exercise   Therapeutic Exercise Grid     Exercise 1  Exercise 2  Exercise 3  Exercise 4    Exercise :    Left ankle towel stretch  Ankle alphabet  Theraband:  DF/PF  Inv/Evr Toe curls with towel    Repetition/Time :    3 x 30 sec   X 1    2 x 10    2 x 1 min       Resist or Assist :    No resistance   No resistance   Yellow Theraband   No resistance     Comment :           Picked up marbles vs  towel toe curls     Done :    YES   YES  YES  YES                   Exercise 5  Exercise 6  Exercise 7  Exercise 8    Exercise :    Seated toe raises Seated heel raises  BAPS  STS     Repetition/Time :    x 20    x 20    x 10    x 10      Resist or Assist :    No resistance   No resistance   No resistance   No resistance     Comment :     Upper and lower case CW and CCW On foam    Done :    no   no no no                   Exercise 9  Exercise 10  Exercise 11  Exercise 12    Exercise :    SLS Gait Training NUSTEP Semi-tandem   Repetition/Time :    x 10    2 laps    5 min    3 x 30sec   Resist or Assist :    No resistance   No resistance   No resistance      Comment :       With hurrycane       Done :    No     YES  No   no                    Exercise 13 Exercise 14  Exercise 15  Exercise 16   Exercise :    R toe taps      Repetition/Time :    10      Resist or Assist :    6 inch step      Comment :    Force FWB on L foot              Done :    yes                              Patient Education and Home Exercises     Home Exercises Provided and Patient Education Provided     Education provided:   - HEP - ankle RANGE OF MOTION     Written Home Exercises Provided: Patient instructed to cont prior HEP. Exercises were reviewed and Sharlene was able to demonstrate them prior to the end of the session.  Sharlene demonstrated good  understanding of the education provided. See EMR under Patient Instructions for exercises provided during therapy sessions    ASSESSMENT     Pt continues to have a lot of trouble normalizing gait pattern and maintaining neutral trunk alignment due to L ankle pain and decreased confidence in ankle with cues given to facilitate midline and hand-holding used vs single crutch. Very limited L foot movement. Sharlene was able to increase step length with single cruthch and hond held ambulation.     Sharlene Is progressing well towards her goals.   Pt prognosis is Good.     Pt will continue to benefit from skilled outpatient physical therapy to address the deficits listed in the problem list box on initial evaluation, provide pt/family education and to maximize pt's level of independence in  the home and community environment.     Pt's spiritual, cultural and educational needs considered and pt agreeable to plan of care and goals.     Anticipated barriers to physical therapy: Left tibia/fibula ORIF    Goals:   Short Term Goals (4 Weeks):   MET- 1. Patient will be independent with home exercise program to supplement therapy in improving functional mobility.  IN PROGRESS- 2. Patient will improve dorsiflexion active range of motion with knee extended to 5 degrees to improve step through during gait.   IN PROGRESS- 3. Patient will improve single leg balance duration to >/= 15 seconds  to improve balance and functional mobility .   IN PROGRESS- 4. Patient will ambulate with standard cane on all surfaces x 200 +feet .      Long Term Goals (8 Weeks):   1. Patient will improve FOTO score to </= 60% limited to decrease perceived limitation with mobility.   2. Patient will improve impaired lower extremity strength to >/= 4/5 to improve strength for functional tasks.  3. Patient will improve dorsiflexion active range of motion with knee extended to 10 degrees to improve gait pattern .  4. Patient will improve single leg balance duration to 30 seconds  to improve functional mobility and balance .   5. Patient will report no pain during ambulation to improve gait pattern     PLAN     Outpatient Physical Therapy 3 times weekly for 4 weeks to include the following interventions: Gait Training, Patient Education, Therapeutic Activities, and Therapeutic Exercise.     Oleksandr Arceo, PTA

## 2023-04-21 ENCOUNTER — CLINICAL SUPPORT (OUTPATIENT)
Dept: REHABILITATION | Facility: HOSPITAL | Age: 33
End: 2023-04-21
Payer: MEDICAID

## 2023-04-21 DIAGNOSIS — M25.572 LEFT ANKLE PAIN, UNSPECIFIED CHRONICITY: ICD-10-CM

## 2023-04-21 DIAGNOSIS — R26.89 IMPAIRMENT OF BALANCE: ICD-10-CM

## 2023-04-21 DIAGNOSIS — M25.672 DECREASED RANGE OF MOTION OF LEFT ANKLE: Primary | ICD-10-CM

## 2023-04-21 DIAGNOSIS — R29.898 DECREASED STRENGTH OF LOWER EXTREMITY: ICD-10-CM

## 2023-04-21 DIAGNOSIS — R26.9 GAIT ABNORMALITY: ICD-10-CM

## 2023-04-21 PROCEDURE — 97110 THERAPEUTIC EXERCISES: CPT | Mod: CQ

## 2023-04-21 NOTE — PROGRESS NOTES
[facility]PAT[facility]ERAP[facility]   Physical Therapy Treatment Note     Name: Sharlene Ellington [facility] Number: [id number]    Therapy Diagnosis:   Encounter Diagnoses   Name Primary?    Decreased range of motion of left ankle Yes    Decreased strength of lower extremity     Impairment of balance     Gait abnormality     Left ankle pain, unspecified chronicity        Physician: Donny, [facility]    Visit Date: 4/21/2023    Physician Orders: PT eval and treat   Medical Diagnosis from Referral: S82.872A closed displaced pilon fracture of left tibia   Evaluation Date: 3/13/2023  Authorization Period Expiration: 5/12/23  Plan of Care Expiration: 4/21/23  Reassessment / POC completed: 4/12/23  Visit # / Visits authorized: 9/12       PTA Visit #: 2/5     Time In: 1500  Time Out: 1530  Total Billable Time: 30 minutes    SUBJECTIVE     Pt reports: less pain today.   She was compliant with home exercise program.  Response to previous treatment: good   Functional change: none     Pain: 2/10 with Meloxicam  Location: left ankles (medial malleolus )     OBJECTIVE     Objective Measures updated at progress report unless specified.     Treatment     Sharlene received the treatments listed below:      therapeutic exercises to develop strength, endurance, ROM, and flexibility for 30 minutes including:  Therapeutic Exercise   Therapeutic Exercise Grid     Exercise 1  Exercise 2  Exercise 3  Exercise 4    Exercise :    Left ankle towel stretch  Ankle alphabet  Theraband:  DF/PF  Inv/Evr Toe curls with towel    Repetition/Time :    3 x 30 sec   X 1    2 x 10    2 x 1 min       Resist or Assist :    No resistance   No resistance   Yellow Theraband   No resistance     Comment :           Picked up marbles vs  towel toe curls     Done :    YES   YES  YES  YES                   Exercise 5  Exercise 6  Exercise 7  Exercise 8    Exercise :    Seated toe raises Seated heel raises  BAPS  STS    Repetition/Time :    x 20    x 20    x  "10    x 10      Resist or Assist :    No resistance   No resistance   No resistance   No resistance     Comment :     Upper and lower case CW and CCW On foam    Done :    no   no no no                   Exercise 9  Exercise 10  Exercise 11  Exercise 12    Exercise :    SLS Gait Training NUSTEP Semi-tandem   Repetition/Time :    x 10    2 laps    5 min    3 x 30sec   Resist or Assist :    No resistance   No resistance   No resistance      Comment :       With hurrycane       Done :    No     YES  No   no                    Exercise 13 Exercise 14  Exercise 15  Exercise 16   Exercise :    R toe taps Weight shifts in // bars R foot step  ups     Repetition/Time :    10 10 3 x 15 sec    Resist or Assist :    6 inch step      Comment :    Force FWB on L foot      4" step        Done :    yes   yes   yes                        Patient Education and Home Exercises     Home Exercises Provided and Patient Education Provided     Education provided:   - HEP - ankle RANGE OF MOTION     Written Home Exercises Provided: Patient instructed to cont prior HEP. Exercises were reviewed and Sharlene was able to demonstrate them prior to the end of the session.  Sharlene demonstrated good  understanding of the education provided. See EMR under Patient Instructions for exercises provided during therapy sessions    ASSESSMENT     Pt had better gait today, with less reliance on hand holding. Positioned R foot on step in // bars to promote L lower extremity weight bearing with good tolerance. She has very little instability when static weight bearing on L lower extremity.     Sharlene Is progressing well towards her goals.   Pt prognosis is Good.     Pt will continue to benefit from skilled outpatient physical therapy to address the deficits listed in the problem list box on initial evaluation, provide pt/family education and to maximize pt's level of independence in the home and community environment.     Pt's spiritual, cultural and educational " needs considered and pt agreeable to plan of care and goals.     Anticipated barriers to physical therapy: Left tibia/fibula ORIF    Goals:   Short Term Goals (4 Weeks):   MET- 1. Patient will be independent with home exercise program to supplement therapy in improving functional mobility.  IN PROGRESS- 2. Patient will improve dorsiflexion active range of motion with knee extended to 5 degrees to improve step through during gait.   IN PROGRESS- 3. Patient will improve single leg balance duration to >/= 15 seconds  to improve balance and functional mobility .   IN PROGRESS- 4. Patient will ambulate with standard cane on all surfaces x 200 +feet .      Long Term Goals (8 Weeks):   1. Patient will improve FOTO score to </= 60% limited to decrease perceived limitation with mobility.   2. Patient will improve impaired lower extremity strength to >/= 4/5 to improve strength for functional tasks.  3. Patient will improve dorsiflexion active range of motion with knee extended to 10 degrees to improve gait pattern .  4. Patient will improve single leg balance duration to 30 seconds  to improve functional mobility and balance .   5. Patient will report no pain during ambulation to improve gait pattern     PLAN     Outpatient Physical Therapy 3 times weekly for 4 weeks to include the following interventions: Gait Training, Patient Education, Therapeutic Activities, and Therapeutic Exercise.     Oleksandr Arceo, PTA

## 2023-04-24 NOTE — PROCEDURES
Colposcopy    Date/Time: 4/14/2023 10:30 AM  Performed by: Violeta Mckeon MD  Authorized by: Violeta Mckeon MD     Consent Done?:  Yes (Written)  Timeout:Immediately prior to procedure a time out was called to verify the correct patient, procedure, equipment, support staff and site/side marked as required  Prep:Patient was prepped and draped in the usual sterile fashion    Colposcopy Site:  Cervix  Position:  Supine  Acrowhite Lesion: No    Atypical Vessels? Yes    Transformation Zone Adequate?: Yes    Biopsy?: Yes         Location:  Cervix ((12 00))  ECC Performed?: Yes    LEEP Performed?: No    Estimated blood loss (cc):  1   Patient tolerated the procedure well with no immediate complications.   Post-operative instructions were provided for the patient.   Patient was discharged and will follow up if any complications occur    Previous Pap Smear:  10/2021 PAWAN MARY+  10/2022 PAWAN MARY+    Violeta Mckeon MD PGY-4  Obstetrics and Gynecology

## 2023-04-24 NOTE — PROGRESS NOTES
Called Ms Sharlene Tannermanasa Tong to discuss results of colposcopy.    10/2021 NILM, OHRHPV+  11/2022 NILM, OHRHPV+    Colpo 4/14/23  1. 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         2. Endocervix, curettage:  -Fragments of benign endocervical tissue.      Discussed per ASCCP guidelines, recommendation for repeat pap/HPV cotest in 1 year. Patient confirms understanding.    Summer Reid MD  [employer] [facility] PGY2  04/24/2023 4:09 PM

## 2023-05-01 NOTE — PROGRESS NOTES
[facility]   Physical Therapy Treatment Note     Name: Sharlene Ellington [facility]  Clinic Number: [id number]    Therapy Diagnosis:   Encounter Diagnoses   Name Primary?    Decreased range of motion of left ankle Yes    Decreased strength of lower extremity     Impairment of balance     Gait abnormality     Left ankle pain, unspecified chronicity        Physician: Donny, [facility]    Visit Date: 5/2/2023    Physician Orders: PT eval and treat   Medical Diagnosis from Referral: S82.872A closed displaced pilon fracture of left tibia   Evaluation Date: 3/13/2023  Authorization Period Expiration: 5/12/23  Plan of Care Expiration: 4/21/23  Reassessment / POC completed: 4/12/23  Visit # / Visits authorized: 10/12       PTA Visit #: 2/5     Time In: 1540  Time Out: 1610  Total Billable Time: 30 minutes    SUBJECTIVE     Pt reports: increased soreness in L ankle. Continues to use 2 crutches.  She was compliant with home exercise program.  Response to previous treatment: good   Functional change: none     Pain: 3/10 with Meloxicam  Location: left ankles (medial malleolus )     OBJECTIVE     Objective Measures updated at progress report unless specified.     Treatment     Sharlene received the treatments listed below:      therapeutic exercises to develop strength, endurance, ROM, and flexibility for 30 minutes including:  Therapeutic Exercise   Therapeutic Exercise Grid     Exercise 1  Exercise 2  Exercise 3  Exercise 4    Exercise :    Left ankle towel stretch  Ankle alphabet  Theraband:  DF/PF  Inv/Evr Toe curls with towel    Repetition/Time :    3 x 30 sec   X 1    2 x 10    2 x 1 min       Resist or Assist :    No resistance   No resistance   Yellow Theraband   No resistance     Comment :           Picked up marbles vs  towel toe curls     Done :    YES   YES  YES  YES                   Exercise 5  Exercise 6  Exercise 7  Exercise 8    Exercise :    Seated toe raises Seated heel raises  BAPS  STS   "  Repetition/Time :    x 20    x 20    x 10    x 10      Resist or Assist :    No resistance   No resistance   No resistance   No resistance     Comment :     Upper and lower case CW and CCW On foam    Done :    no   no no no                   Exercise 9  Exercise 10  Exercise 11  Exercise 12    Exercise :    SLS Gait Training NUSTEP Semi-tandem   Repetition/Time :    x 10    2 laps    5 min    3 x 30sec   Resist or Assist :    No resistance   No resistance   No resistance      Comment :       With hurrycane       Done :    No     YES  No   no                    Exercise 13 Exercise 14  Exercise 15  Exercise 16   Exercise :    R toe taps Weight shifts in // bars R foot step  ups     Repetition/Time :    10 10 3 x 15 sec    Resist or Assist :    6 inch step      Comment :    Force FWB on L foot      4" step        Done :    yes   yes   yes                        Patient Education and Home Exercises     Home Exercises Provided and Patient Education Provided     Education provided:   - HEP - ankle RANGE OF MOTION     Written Home Exercises Provided: Patient instructed to cont prior HEP. Exercises were reviewed and Sharlene was able to demonstrate them prior to the end of the session.  Sharlene demonstrated good  understanding of the education provided. See EMR under Patient Instructions for exercises provided during therapy sessions    ASSESSMENT     Pt had better gait today, with less reliance on hand holding. Positioned R foot on step in // bars to promote L lower extremity weight bearing with good tolerance. She has very little instability when static weight bearing on L lower extremity.     Sharlene Is progressing well towards her goals.   Pt prognosis is Good.     Pt will continue to benefit from skilled outpatient physical therapy to address the deficits listed in the problem list box on initial evaluation, provide pt/family education and to maximize pt's level of independence in the home and community environment. "     Pt's spiritual, cultural and educational needs considered and pt agreeable to plan of care and goals.     Anticipated barriers to physical therapy: Left tibia/fibula ORIF    Goals:   Short Term Goals (4 Weeks):   MET- 1. Patient will be independent with home exercise program to supplement therapy in improving functional mobility.  IN PROGRESS- 2. Patient will improve dorsiflexion active range of motion with knee extended to 5 degrees to improve step through during gait.   IN PROGRESS- 3. Patient will improve single leg balance duration to >/= 15 seconds  to improve balance and functional mobility .   IN PROGRESS- 4. Patient will ambulate with standard cane on all surfaces x 200 +feet .      Long Term Goals (8 Weeks):   1. Patient will improve FOTO score to </= 60% limited to decrease perceived limitation with mobility.   2. Patient will improve impaired lower extremity strength to >/= 4/5 to improve strength for functional tasks.  3. Patient will improve dorsiflexion active range of motion with knee extended to 10 degrees to improve gait pattern .  4. Patient will improve single leg balance duration to 30 seconds  to improve functional mobility and balance .   5. Patient will report no pain during ambulation to improve gait pattern     PLAN     Outpatient Physical Therapy 3 times weekly for 4 weeks to include the following interventions: Gait Training, Patient Education, Therapeutic Activities, and Therapeutic Exercise.     Oleksandr Arceo, PTA

## 2023-05-02 ENCOUNTER — CLINICAL SUPPORT (OUTPATIENT)
Dept: REHABILITATION | Facility: HOSPITAL | Age: 33
End: 2023-05-02
Payer: MEDICAID

## 2023-05-02 DIAGNOSIS — M25.572 LEFT ANKLE PAIN, UNSPECIFIED CHRONICITY: ICD-10-CM

## 2023-05-02 DIAGNOSIS — R26.89 IMPAIRMENT OF BALANCE: ICD-10-CM

## 2023-05-02 DIAGNOSIS — M25.672 DECREASED RANGE OF MOTION OF LEFT ANKLE: Primary | ICD-10-CM

## 2023-05-02 DIAGNOSIS — R29.898 DECREASED STRENGTH OF LOWER EXTREMITY: ICD-10-CM

## 2023-05-02 DIAGNOSIS — R26.9 GAIT ABNORMALITY: ICD-10-CM

## 2023-05-02 PROCEDURE — 97110 THERAPEUTIC EXERCISES: CPT | Mod: CQ

## 2023-05-02 NOTE — PROGRESS NOTES
[facility]   Physical Therapy Treatment Note     Name: [facility]  Clinic Number: [id number]    Therapy Diagnosis:   Encounter Diagnoses   Name Primary?    Decreased range of motion of left ankle Yes    Decreased strength of lower extremity     Impairment of balance     Gait abnormality     Left ankle pain, unspecified chronicity        Physician: Donny [facility]    Visit Date: 5/3/2023    Physician Orders: PT eval and treat   Medical Diagnosis from Referral: S82.872A closed displaced pilon fracture of left tibia   Evaluation Date: 3/13/2023  Authorization Period Expiration: 5/12/23  Plan of Care Expiration: 4/21/23  Reassessment / POC completed: 4/12/23  Visit # / Visits authorized: 11/12       PTA Visit #: 4/5     Time In: 1530  Time Out: 1600  Total Billable Time: 30 minutes    SUBJECTIVE     Pt reports: she had some swelling and soreness in the ankle last night. Arrives at therapy with one crutch today.  She was compliant with home exercise program.  Response to previous treatment: good   Functional change: none     Pain: 2/10 with Meloxicam  Location: left ankles (medial malleolus )     OBJECTIVE     Objective Measures updated at progress report unless specified.     Treatment     Sharlene received the treatments listed below:      therapeutic exercises to develop strength, endurance, ROM, and flexibility for 30 minutes including:  Therapeutic Exercise   Therapeutic Exercise Grid     Exercise 1  Exercise 2  Exercise 3  Exercise 4    Exercise :    Left ankle towel stretch  Ankle alphabet  Theraband:  DF/PF  Inv/Evr Toe curls with towel    Repetition/Time :    3 x 30 sec   X 1    2 x 10    2 x 1 min       Resist or Assist :    No resistance   No resistance   AAROM  only   No resistance     Comment :           Picked up marbles vs  towel toe curls     Done :    YES   no  YES  YES                   Exercise 5  Exercise 6  Exercise 7  Exercise 8    Exercise :    Seated toe  "raises Seated heel raises  BAPS  STS    Repetition/Time :    x 20    x 20    x 10    x 10      Resist or Assist :    No resistance   No resistance   No resistance   No resistance     Comment :     Upper and lower case CW and CCW On foam    Done :    no   no yes no                   Exercise 9  Exercise 10  Exercise 11  Exercise 12    Exercise :    SLS Gait Training NUSTEP Semi-tandem   Repetition/Time :    x 10    2 laps    5 min    3 x 30sec   Resist or Assist :    No resistance   No resistance   No resistance      Comment :       With hurrycane       Done :    No     YES  No   no                    Exercise 13 Exercise 14  Exercise 15  Exercise 16   Exercise :    R toe taps Weight shifts in // bars R foot step  ups     Repetition/Time :    10 10 3 x 15 sec    Resist or Assist :    6 inch step      Comment :    Force FWB on L foot      4" step        Done :    yes   yes   yes                        Patient Education and Home Exercises     Home Exercises Provided and Patient Education Provided     Education provided:   - HEP - ankle RANGE OF MOTION     Written Home Exercises Provided: Patient instructed to cont prior HEP. Exercises were reviewed and Sharlene was able to demonstrate them prior to the end of the session.  Sharlene demonstrated good  understanding of the education provided. See EMR under Patient Instructions for exercises provided during therapy sessions    ASSESSMENT     Performed gait with quad cane with good tolerance.Performed AAROM on L ankle with cues given to allow full ROM in all planes. She has very little instability when static weight bearing on L lower extremity.     Sharlene Is progressing well towards her goals.   Pt prognosis is Good.     Pt will continue to benefit from skilled outpatient physical therapy to address the deficits listed in the problem list box on initial evaluation, provide pt/family education and to maximize pt's level of independence in the home and community environment. "     Pt's spiritual, cultural and educational needs considered and pt agreeable to plan of care and goals.     Anticipated barriers to physical therapy: Left tibia/fibula ORIF    Goals:   Short Term Goals (4 Weeks):   MET- 1. Patient will be independent with home exercise program to supplement therapy in improving functional mobility.  IN PROGRESS- 2. Patient will improve dorsiflexion active range of motion with knee extended to 5 degrees to improve step through during gait.   IN PROGRESS- 3. Patient will improve single leg balance duration to >/= 15 seconds  to improve balance and functional mobility .   IN PROGRESS- 4. Patient will ambulate with standard cane on all surfaces x 200 +feet .      Long Term Goals (8 Weeks):   1. Patient will improve FOTO score to </= 60% limited to decrease perceived limitation with mobility.   2. Patient will improve impaired lower extremity strength to >/= 4/5 to improve strength for functional tasks.  3. Patient will improve dorsiflexion active range of motion with knee extended to 10 degrees to improve gait pattern .  4. Patient will improve single leg balance duration to 30 seconds  to improve functional mobility and balance .   5. Patient will report no pain during ambulation to improve gait pattern     PLAN     Outpatient Physical Therapy 3 times weekly for 4 weeks to include the following interventions: Gait Training, Patient Education, Therapeutic Activities, and Therapeutic Exercise.     Oleksandr Arceo, PTA

## 2023-05-03 ENCOUNTER — CLINICAL SUPPORT (OUTPATIENT)
Dept: REHABILITATION | Facility: HOSPITAL | Age: 33
End: 2023-05-03
Payer: MEDICAID

## 2023-05-03 DIAGNOSIS — M25.572 LEFT ANKLE PAIN, UNSPECIFIED CHRONICITY: ICD-10-CM

## 2023-05-03 DIAGNOSIS — R26.89 IMPAIRMENT OF BALANCE: ICD-10-CM

## 2023-05-03 DIAGNOSIS — R29.898 DECREASED STRENGTH OF LOWER EXTREMITY: ICD-10-CM

## 2023-05-03 DIAGNOSIS — M25.672 DECREASED RANGE OF MOTION OF LEFT ANKLE: Primary | ICD-10-CM

## 2023-05-03 DIAGNOSIS — R26.9 GAIT ABNORMALITY: ICD-10-CM

## 2023-05-03 PROCEDURE — 97110 THERAPEUTIC EXERCISES: CPT | Mod: CQ

## 2023-05-04 ENCOUNTER — DOCUMENTATION ONLY (OUTPATIENT)
Dept: REHABILITATION | Facility: HOSPITAL | Age: 33
End: 2023-05-04

## 2023-05-04 ENCOUNTER — CLINICAL SUPPORT (OUTPATIENT)
Dept: REHABILITATION | Facility: HOSPITAL | Age: 33
End: 2023-05-04
Payer: MEDICAID

## 2023-05-04 DIAGNOSIS — M25.672 DECREASED RANGE OF MOTION OF LEFT ANKLE: Primary | ICD-10-CM

## 2023-05-04 DIAGNOSIS — R26.89 IMPAIRMENT OF BALANCE: ICD-10-CM

## 2023-05-04 DIAGNOSIS — R29.898 DECREASED STRENGTH OF LOWER EXTREMITY: ICD-10-CM

## 2023-05-04 DIAGNOSIS — R26.9 GAIT ABNORMALITY: ICD-10-CM

## 2023-05-04 DIAGNOSIS — M25.572 LEFT ANKLE PAIN, UNSPECIFIED CHRONICITY: ICD-10-CM

## 2023-05-04 PROCEDURE — 97110 THERAPEUTIC EXERCISES: CPT

## 2023-05-04 NOTE — PROGRESS NOTES
[facility]   Physical Therapy Treatment Note     Name: Sharlene Ellington [facility]  Clinic Number: [id number]    Therapy Diagnosis:   Encounter Diagnoses   Name Primary?    Decreased range of motion of left ankle Yes    Decreased strength of lower extremity     Impairment of balance     Gait abnormality     Left ankle pain, unspecified chronicity        Physician: Donny, [facility]    Visit Date: 5/4/2023    Physician Orders: PT eval and treat   Medical Diagnosis from Referral: S82.872A closed displaced pilon fracture of left tibia   Evaluation Date: 3/13/2023  Authorization Period Expiration: 5/12/23  Plan of Care Expiration: 4/21/23  Reassessment / POC completed: 4/12/23  Visit # / Visits authorized: 12/12       PTA Visit #: 4/5     Time In: 1530  Time Out: 1600  Total Billable Time: 30 minutes    SUBJECTIVE     Pt reports: that she is having pain in the left ankle (post medial malleolus). Working on walking with less support at home.   She was compliant with home exercise program.  Response to previous treatment: good   Functional change: none     Pain: 2/10 with Meloxicam  Location: left ankles (medial malleolus )     OBJECTIVE     Objective Measures updated at progress report unless specified.     Treatment     Sharlene received the treatments listed below:      therapeutic exercises to develop strength, endurance, ROM, and flexibility for 30 minutes including:  Therapeutic Exercise   Therapeutic Exercise Grid     Exercise 1  Exercise 2  Exercise 3  Exercise 4    Exercise :    Left ankle towel stretch  Ankle alphabet  Theraband:  DF/PF  Inv/Evr Toe curls with towel    Repetition/Time :    3 x 30 sec   X 1    2 x 10    2 x 1 min       Resist or Assist :    No resistance   No resistance   AAROM  only   No resistance     Comment :           Picked up marbles vs  towel toe curls     Done :    N    no  YES  YES                   Exercise 5  Exercise 6  Exercise 7  Exercise 8    Exercise :     "Seated toe raises Seated heel raises  BAPS  STS    Repetition/Time :    x 20    x 20    x 10    x 10      Resist or Assist :    No resistance   No resistance   Level 2  No resistance     Comment :     Upper and lower case CW and CCW On foam    Done :    no   no yes no                   Exercise 9  Exercise 10  Exercise 11  Exercise 12    Exercise :    SLS Gait Training NUSTEP BOSU   Repetition/Time :    6 x 15 sec     2 laps    5 min    3 x 30 sec   Resist or Assist :    No resistance   No resistance   No resistance      Comment :       With hurrycane    Parallel stance   Done :    YES      YES  No   YES                     Exercise 13 Exercise 14  Exercise 15  Exercise 16   Exercise :    R toe taps Weight shifts in // bars Right  Step downs     Repetition/Time :    10 10 10    Resist or Assist :    6 inch step  4" step    Comment :    Force FWB on L foot      Fwd  Lat  Post        Done :      yes                      Patient Education and Home Exercises     Home Exercises Provided and Patient Education Provided     Education provided:   - HEP - ankle RANGE OF MOTION     Written Home Exercises Provided: Patient instructed to cont prior HEP. Exercises were reviewed and Sharlene was able to demonstrate them prior to the end of the session.  Sharlene demonstrated good  understanding of the education provided. See EMR under Patient Instructions for exercises provided during therapy sessions    ASSESSMENT     Sharlene tolerated treatment well today performing all exercises with good effort and requiring constant cueing and minimal - moderate assist for proper exercise execution. Noted improvement in control of left ankle during RANGE OF MOTION and strengthening exercises. Patient having difficulty with achieving heel toe pattern on foot strike phase of gait pattern. Advanced patient to step downs in all directions and balance activities on the foam pad with moderate success.     Sharlene Is progressing well towards her goals. "   Pt prognosis is Good.     Pt will continue to benefit from skilled outpatient physical therapy to address the deficits listed in the problem list box on initial evaluation, provide pt/family education and to maximize pt's level of independence in the home and community environment.     Pt's spiritual, cultural and educational needs considered and pt agreeable to plan of care and goals.     Anticipated barriers to physical therapy: Left tibia/fibula ORIF    Goals:   Short Term Goals (4 Weeks):   MET- 1. Patient will be independent with home exercise program to supplement therapy in improving functional mobility.  IN PROGRESS- 2. Patient will improve dorsiflexion active range of motion with knee extended to 5 degrees to improve step through during gait.   IN PROGRESS- 3. Patient will improve single leg balance duration to >/= 15 seconds  to improve balance and functional mobility .   IN PROGRESS- 4. Patient will ambulate with standard cane on all surfaces x 200 +feet .      Long Term Goals (8 Weeks):   1. Patient will improve FOTO score to </= 60% limited to decrease perceived limitation with mobility.   2. Patient will improve impaired lower extremity strength to >/= 4/5 to improve strength for functional tasks.  3. Patient will improve dorsiflexion active range of motion with knee extended to 10 degrees to improve gait pattern .  4. Patient will improve single leg balance duration to 30 seconds  to improve functional mobility and balance .   5. Patient will report no pain during ambulation to improve gait pattern     PLAN     Outpatient Physical Therapy 3 times weekly for 4 weeks to include the following interventions: Gait Training, Patient Education, Therapeutic Activities, and Therapeutic Exercise.   - Patient will be on hold while awaiting authorization of extended treatment.     Keith Guerin, PT

## 2023-05-26 DIAGNOSIS — S82.872A CLOSED DISPLACED PILON FRACTURE OF LEFT TIBIA: Primary | ICD-10-CM

## 2023-06-05 ENCOUNTER — CLINICAL SUPPORT (OUTPATIENT)
Dept: REHABILITATION | Facility: HOSPITAL | Age: 33
End: 2023-06-05
Payer: MEDICAID

## 2023-06-05 DIAGNOSIS — R29.898 DECREASED STRENGTH OF LOWER EXTREMITY: ICD-10-CM

## 2023-06-05 DIAGNOSIS — R26.89 IMPAIRMENT OF BALANCE: ICD-10-CM

## 2023-06-05 DIAGNOSIS — M25.672 DECREASED RANGE OF MOTION OF LEFT ANKLE: Primary | ICD-10-CM

## 2023-06-05 DIAGNOSIS — R26.9 GAIT ABNORMALITY: ICD-10-CM

## 2023-06-05 DIAGNOSIS — M25.572 LEFT ANKLE PAIN, UNSPECIFIED CHRONICITY: ICD-10-CM

## 2023-06-05 PROCEDURE — 97110 THERAPEUTIC EXERCISES: CPT

## 2023-06-06 NOTE — PLAN OF CARE
[facility]  Physical Therapy Plan of Care Note    Name: Sharlene Ellington [facility] Number: [id number]    Therapy Diagnosis:   Encounter Diagnoses   Name Primary?    Decreased range of motion of left ankle Yes    Decreased strength of lower extremity     Impairment of balance     Gait abnormality     Left ankle pain, unspecified chronicity      Physician: Max Kraus DO    Visit Date: 6/5/2023  Time In: 1400  Time Out: 1430  Total billable minutes: 30    Physician Orders: PT eval and treat   Medical Diagnosis from Referral: S82.872A closed displaced pilon fracture of left tibia   Evaluation Date: 3/13/2023  Authorization Period Expiration: 7/10/23  Plan of Care Expiration: 6/16/23  Reassessment / POC completed: 4/12/23, 6/5/23  Visit # / Visits authorized: 1/6      Precautions: Standard  Functional Level Prior to Evaluation:   IND prior to injury     SUBJECTIVE     Pt reports: that she has been ambulating without an assistive device around her home. Concerned about lack of left ankle RANGE OF MOTION .  She was compliant with home exercise program.  Response to previous treatment: Good   Functional change: improved gait pattern     Functional ankle  FOTO score: 47 ( 6/5/23) INTAKE= 34 (3/28/23)    Pain: 5/10  Location: left ankles     OBJECTIVE     Update:     Palpation: mild tenderness to touch around medial malleolus of left ankle.      Posture: WNL      Sensation : Light touch intact      PROM Right Left Comment   DF: WNL degrees 3 degrees     PF: WNL  degrees 45 degrees     Eversion: WNL degrees 15 degrees     Inversion: WNL degrees 40 degrees     *pain      Strength:     MMT Right Left   Hip Flexors 5/5 4/5   Hip Extensors 5/5 4/5   Hip Adductors 5/5 4/5   Hip Abductors 5/5 4/5   Knee Flexors 5/5 3+/5   Knee Extensors 5/5 5/5   Ankle Dorsiflexors 5/5 3 /5 *    Ankle Plantarflexors 5/5 2+/5    * In available RANGE OF MOTION     Flexibility:                                                       RIGHT             LEFT  Hamstrings       Piriformis       Rectus        Quadriceps       Gastroc   Min tightness         Gait  Ambulation:  IND on level and unlevel surfaces.   Distance ambulated: 250+ feet   Displays the following gait deviations: minimal decrease in stance phase on the left   Ascending stairs: step to  pattern, B hand rail, supervision  Descending stairs: step to  pattern, B hand rail, supervision     Balance  Static standing: good   Dynamic standing: good   Single Limb Stance:  Right=0 seconds          Left=0 seconds  Tandem stance: left foot back: 0 sec Right foot back : 5 sec  MCTSIB: 3/4 (30/30/30/3)       Special Test:  Gait Speed: .55 meters/ seconds without AD    Treatment:   Therapeutic Exercise Grid     Exercise 1  Exercise 2  Exercise 3  Exercise 4    Exercise :    Left ankle towel stretch  Ankle alphabet  Theraband:  DF/PF  Inv/Evr Toe curls with towel    Repetition/Time :    3 x 30 sec   X 1    2 x 10    2 x 1 min       Resist or Assist :    No resistance   No resistance   Red Theraband   No resistance     Comment :            Picked up marbles vs  towel toe curls     Done :    YES                       Exercise 5  Exercise 6  Exercise 7  Exercise 8    Exercise :    Seated toe raises Seated heel raises  BAPS  STS    Repetition/Time :    x 20    x 20    x 10    x 10      Resist or Assist :    No resistance   No resistance   No resistance   No resistance     Comment :       CW and CCW On foam    Done :                           Exercise 9  Exercise 10  Exercise 11  Exercise 12    Exercise :    SLS Gait Training NUSTEP Semi-tandem   Repetition/Time :    x 10    2 laps    5 min    3 x 30sec   Resist or Assist :    No resistance   No resistance   No resistance       Comment :       With hurrycane        Done :                                  Exercise 13 Exercise 14  Exercise 15  Exercise 16   Exercise :    R toe taps         Repetition/Time :    10         Resist or Assist  :    6 inch step         Comment :    Force FWB on L foot              Done :                                   ASSESSMENT     Update: Sharlene is progressing well with therapy demonstrating improvement in left ankle AROM and mild improvements in strength. Patient continues to have difficulty with balance particularly tandem and single leg stance strategies. Sharlene has progressed ambulation to no assistive device but continues with moderate deviations due to discomfort and balance.   Anticipated Barriers for therapy: ORIF left ankle     Goals:    Short Term Goals (4 Weeks):   MET- 1. Patient will be independent with home exercise program to supplement therapy in improving functional mobility.  IN PROGRESS- 2. Patient will improve dorsiflexion active range of motion with knee extended to 5 degrees to improve step through during gait.   IN PROGRESS- 3. Patient will improve single leg balance duration to >/= 15 seconds  to improve balance and functional mobility .   MET- 4. Patient will ambulate with standard cane on all surfaces x 200 +feet .      Long Term Goals (8 Weeks):   IN PROGRESS- 1. Patient will improve FOTO score to </= 60% limited to decrease perceived limitation with mobility.   IN PROGRESS-2. Patient will improve impaired lower extremity strength to >/= 4/5 to improve strength for functional tasks.  IN PROGRESS-3. Patient will improve dorsiflexion active range of motion with knee extended to 10 degrees to improve gait pattern .  IN PROGRESS-4. Patient will improve single leg balance duration to 30 seconds  to improve functional mobility and balance .   IN PROGRESS-5. Patient will report no pain during ambulation to improve gait pattern     Reasons for Recertification of Therapy:  Re-Assessment only today. Will review and update plan of care at later date.     PLAN     Recommended Treatment Plan: Continue 3 times per week for 2 weeks:  Gait Training, Patient Education, Therapeutic Activities, and Therapeutic  Exercise      Keith Guerin, PT

## 2023-06-08 ENCOUNTER — CLINICAL SUPPORT (OUTPATIENT)
Dept: REHABILITATION | Facility: HOSPITAL | Age: 33
End: 2023-06-08
Payer: MEDICAID

## 2023-06-08 DIAGNOSIS — R29.898 DECREASED STRENGTH OF LOWER EXTREMITY: ICD-10-CM

## 2023-06-08 DIAGNOSIS — M25.672 DECREASED RANGE OF MOTION OF LEFT ANKLE: Primary | ICD-10-CM

## 2023-06-08 DIAGNOSIS — R26.89 IMPAIRMENT OF BALANCE: ICD-10-CM

## 2023-06-08 DIAGNOSIS — M25.572 LEFT ANKLE PAIN, UNSPECIFIED CHRONICITY: ICD-10-CM

## 2023-06-08 DIAGNOSIS — R26.9 GAIT ABNORMALITY: ICD-10-CM

## 2023-06-08 PROCEDURE — 97110 THERAPEUTIC EXERCISES: CPT

## 2023-06-08 NOTE — PROGRESS NOTES
[facility]   Physical Therapy Treatment Note     Name: Sharlene Ellington [facility] Number: [id number]    Therapy Diagnosis:   Encounter Diagnoses   Name Primary?    Decreased range of motion of left ankle Yes    Decreased strength of lower extremity     Impairment of balance     Gait abnormality     Left ankle pain, unspecified chronicity        Physician: Max Kraus DO    Visit Date: 6/8/2023    Physician Orders: PT eval and treat   Medical Diagnosis from Referral: S82.872A closed displaced pilon fracture of left tibia   Evaluation Date: 3/13/2023  Authorization Period Expiration: 7/10/23  Plan of Care Expiration: 7/10/2023  Reassessment / POC completed: 4/12/23, 6/5/23  Next POC due: 6/11/2023  Visit # / Visits authorized: 2/6     PTA Visit #: 0/5     Time In: 1130  Time Out: 1200  Total Billable Time: 30 minutes    SUBJECTIVE     Pt reports: stopped using her single crutch at beginning of June with increased L ankle pain due to more pressure on it   She was compliant with home exercise program.  Response to previous treatment: good   Functional change: none     Pain: 4/10 with Meloxicam  Location: left ankles (medial and lateral malleolus )     OBJECTIVE     Objective Measures updated at progress report unless specified.     Treatment     Sharlene received the treatments listed below:      therapeutic exercises to develop strength, endurance, ROM, and flexibility for 30 minutes including:    Therapeutic Exercise   Therapeutic Exercise Grid     Exercise 1  Exercise 2  Exercise 3  Exercise 4    Exercise :    Left ankle towel stretch  Wedge stretch: knee bent Theraband:  DF/PF  Inv/Evr Standing: heel raises   Repetition/Time :    3 x 30 sec   3 x 20 sec 20    10   Resist or Assist :    No resistance    Red TB      Comment :            Done :    No    yes YES  yes                  Exercise 5  Exercise 6  Exercise 7  Exercise 8    Exercise :      BAPS  STS    Repetition/Time :       "x 10    x 10      Resist or Assist :      Level 2  No resistance     Comment :      CW and CCW On foam    Done :      no no                   Exercise 9  Exercise 10  Exercise 11  Exercise 12    Exercise :    SLS Gait Training LUDA MCCAULEY   Repetition/Time :    6 x 15 sec     2 laps    5 min    3 x 30 sec   Resist or Assist :    No resistance   No resistance   No resistance      Comment :       Without AD    Parallel stance   Done :    no      no No   no                     Exercise 13 Exercise 14  Exercise 15  Exercise 16   Exercise :    R toe taps  Right: Step s    Repetition/Time :    10  10    Resist or Assist :    6 inch step  6" step    Comment :    Force FWB on L foot      Fwd only  Significant use of B UE on rails         Done :    yes  yes                      Patient Education and Home Exercises     Home Exercises Provided and Patient Education Provided     Education provided:   - HEP - ankle RANGE OF MOTION     Written Home Exercises Provided: Patient instructed to cont prior HEP. Exercises were reviewed and Sharlene was able to demonstrate them prior to the end of the session.  Sharlene demonstrated good  understanding of the education provided. See EMR under Patient Instructions for exercises provided during therapy sessions    ASSESSMENT     Pt has progressed to now amb without AD however with slow and cautious robinson due to both L ankle pain, limited ROM and strength, and fear of falling. Pt unable to perform standing L heel raise without assistance from R due to plantar flexor weakness. Pt has difficulty with FWB on L LE while performing R LE movements and unable to L single leg stand unsupported. Pt will require significantly more PT then what has been approved    Sharlene Is progressing well towards her goals.   Pt prognosis is Good.     Pt will continue to benefit from skilled outpatient physical therapy to address the deficits listed in the problem list box on initial evaluation, provide pt/family " education and to maximize pt's level of independence in the home and community environment.     Pt's spiritual, cultural and educational needs considered and pt agreeable to plan of care and goals.     Anticipated barriers to physical therapy: Left tibia/fibula ORIF    Goals:   Short Term Goals (4 Weeks):     MET- 1. Patient will be independent with home exercise program to supplement therapy in improving functional mobility.    IN PROGRESS- 2. Patient will improve dorsiflexion active range of motion with knee extended to 5 degrees to improve step through during gait.     IN PROGRESS- 3. Patient will improve single leg balance duration to >/= 15 seconds  to improve balance and functional mobility .     MET- 4. Patient will ambulate with standard cane on all surfaces x 200 +feet .      Long Term Goals (8 Weeks):     IN PROGRESS- 1. Patient will improve FOTO score to </= 60% limited to decrease perceived limitation with mobility.     IN PROGRESS-2. Patient will improve impaired lower extremity strength to >/= 4/5 to improve strength for functional tasks.    IN PROGRESS-3. Patient will improve dorsiflexion active range of motion with knee extended to 10 degrees to improve gait pattern .    IN PROGRESS-4. Patient will improve single leg balance duration to 30 seconds  to improve functional mobility and balance .     IN PROGRESS-5. Patient will report no pain during ambulation to improve gait pattern     PLAN      Continue 3 times per week for 2 weeks:  Gait Training, Patient Education, Therapeutic Activities, and Therapeutic Exercise    Ananya Lora PT

## 2023-06-12 ENCOUNTER — CLINICAL SUPPORT (OUTPATIENT)
Dept: REHABILITATION | Facility: HOSPITAL | Age: 33
End: 2023-06-12
Payer: MEDICAID

## 2023-06-12 DIAGNOSIS — R26.9 GAIT ABNORMALITY: ICD-10-CM

## 2023-06-12 DIAGNOSIS — M25.572 LEFT ANKLE PAIN, UNSPECIFIED CHRONICITY: ICD-10-CM

## 2023-06-12 DIAGNOSIS — M25.672 DECREASED RANGE OF MOTION OF LEFT ANKLE: Primary | ICD-10-CM

## 2023-06-12 DIAGNOSIS — R29.898 DECREASED STRENGTH OF LOWER EXTREMITY: ICD-10-CM

## 2023-06-12 DIAGNOSIS — R26.89 IMPAIRMENT OF BALANCE: ICD-10-CM

## 2023-06-12 PROCEDURE — 97110 THERAPEUTIC EXERCISES: CPT

## 2023-06-12 NOTE — PROGRESS NOTES
[facility]   Physical Therapy Treatment Note     Name: Sharlene Ellington [facility] Number: [id number]    Therapy Diagnosis:   No diagnosis found.    Physician: Max Kraus DO    Visit Date: 6/12/2023    Physician Orders: PT eval and treat   Medical Diagnosis from Referral: S82.872A closed displaced pilon fracture of left tibia   Evaluation Date: 3/13/2023  Authorization Period Expiration: 7/10/23  Plan of Care Expiration: 7/10/2023  Reassessment / POC completed: 4/12/23, 6/5/23  Next POC due: 6/11/2023  Visit # / Visits authorized: 2/6     PTA Visit #: 0/5     Time In: 1130  Time Out: 1200  Total Billable Time: 30 minutes    SUBJECTIVE     Pt reports: stopped using her single crutch at beginning of June with increased L ankle pain due to more pressure on it   She was compliant with home exercise program.  Response to previous treatment: good   Functional change: none     Pain: 4/10 with Meloxicam  Location: left ankles (medial and lateral malleolus )     OBJECTIVE     Objective Measures updated at progress report unless specified.     Treatment     Sharlene received the treatments listed below:      therapeutic exercises to develop strength, endurance, ROM, and flexibility for 30 minutes including:    Therapeutic Exercise   Therapeutic Exercise Grid     Exercise 1  Exercise 2  Exercise 3  Exercise 4    Exercise :    Left ankle towel stretch  Wedge stretch: knee bent Theraband:  DF/PF  Inv/Evr Standing: heel raises   Repetition/Time :    3 x 30 sec   3 x 20 sec 20    10   Resist or Assist :    No resistance    Red TB      Comment :            Done :    No    yes YES  yes                  Exercise 5  Exercise 6  Exercise 7  Exercise 8    Exercise :      BAPS  STS    Repetition/Time :      x 10    x 10      Resist or Assist :      Level 2  No resistance     Comment :      CW and CCW On foam    Done :      no no                   Exercise 9  Exercise 10  Exercise 11  Exercise 12   "  Exercise :    SLS Gait Training LUDA MCCAULEY   Repetition/Time :    6 x 15 sec     2 laps    5 min    3 x 30 sec   Resist or Assist :    No resistance   No resistance   No resistance      Comment :       Without AD    Parallel stance   Done :    no      no No   no                     Exercise 13 Exercise 14  Exercise 15  Exercise 16   Exercise :    R toe taps  Right: Step s    Repetition/Time :    10  10    Resist or Assist :    6 inch step  6" step    Comment :    Force FWB on L foot      Fwd only  Significant use of B UE on rails         Done :    yes  yes                      Patient Education and Home Exercises     Home Exercises Provided and Patient Education Provided     Education provided:   - HEP - ankle RANGE OF MOTION     Written Home Exercises Provided: Patient instructed to cont prior HEP. Exercises were reviewed and Sharlene was able to demonstrate them prior to the end of the session.  Sharlene demonstrated good  understanding of the education provided. See EMR under Patient Instructions for exercises provided during therapy sessions    ASSESSMENT     Pt has progressed to now amb without AD however with slow and cautious robinson due to both L ankle pain, limited ROM and strength, and fear of falling. Pt unable to perform standing L heel raise without assistance from R due to plantar flexor weakness. Pt has difficulty with FWB on L LE while performing R LE movements and unable to L single leg stand unsupported. Pt will require significantly more PT then what has been approved    Sharlene Is progressing well towards her goals.   Pt prognosis is Good.     Pt will continue to benefit from skilled outpatient physical therapy to address the deficits listed in the problem list box on initial evaluation, provide pt/family education and to maximize pt's level of independence in the home and community environment.     Pt's spiritual, cultural and educational needs considered and pt agreeable to plan of care and " goals.     Anticipated barriers to physical therapy: Left tibia/fibula ORIF    Goals:   Short Term Goals (4 Weeks):     MET- 1. Patient will be independent with home exercise program to supplement therapy in improving functional mobility.    IN PROGRESS- 2. Patient will improve dorsiflexion active range of motion with knee extended to 5 degrees to improve step through during gait.     IN PROGRESS- 3. Patient will improve single leg balance duration to >/= 15 seconds  to improve balance and functional mobility .     MET- 4. Patient will ambulate with standard cane on all surfaces x 200 +feet .      Long Term Goals (8 Weeks):     IN PROGRESS- 1. Patient will improve FOTO score to </= 60% limited to decrease perceived limitation with mobility.     IN PROGRESS-2. Patient will improve impaired lower extremity strength to >/= 4/5 to improve strength for functional tasks.    IN PROGRESS-3. Patient will improve dorsiflexion active range of motion with knee extended to 10 degrees to improve gait pattern .    IN PROGRESS-4. Patient will improve single leg balance duration to 30 seconds  to improve functional mobility and balance .     IN PROGRESS-5. Patient will report no pain during ambulation to improve gait pattern     PLAN      Continue 3 times per week for 2 weeks:  Gait Training, Patient Education, Therapeutic Activities, and Therapeutic Exercise    Ananya Lora, PT

## 2023-06-12 NOTE — PLAN OF CARE
[facility]  PT Plan of Care Note     Name: Sharlene Ellington [facility] Number: [id number]    Therapy Diagnosis:   Encounter Diagnoses   Name Primary?    Decreased range of motion of left ankle Yes    Decreased strength of lower extremity     Impairment of balance     Gait abnormality     Left ankle pain, unspecified chronicity      Physician: Max Kraus DO    Visit Date: 6/12/2023  Time In: 1300   Time Out: 1330  Total Billable Time: 30 minutes    Physician Orders: PT EVAL and TREAT  Medical Diagnosis from Referral: S82.872A closed displaced pilon fracture of left tibia   Evaluation Date: 3/13/2023  Authorization Period Expiration: 7/10/23  Plan of Care Expiration: 6/16/23  Reassessment / POC completed: 4/12/23, 6/5/23, 6/12/2023  Visit # / Visits authorized: 3/6    Precautions: Standard  Functional Level Prior to Evaluation: IND prior to injury     SUBJECTIVE     Pt reports: she still does not feel comfortable walking without AD   Pt reports was not compliant with HEP outside of therapy since last session    Pain level: 4/10 with Meloxicam  Location of pain: Left ankle    Functional ankle FOTO score: 47 improved from 34 on eval    OBJECTIVE     Update:  Palpation: tenderness to touch around medial malleolus of left ankle.      Posture: WNL      Sensation : Light touch intact      PROM Right Left Comment   DF: WNL degrees 3 degrees     PF: WNL  degrees 45 degrees     Eversion: WNL degrees 15 degrees     Inversion: WNL degrees 40 degrees     *pain      Strength:     MMT Right Left   Hip Flexors 5/5 4/5   Hip Extensors 5/5 4/5   Hip Adductors 5/5 4/5   Hip Abductors 5/5 4/5   Knee Flexors 5/5 3+/5   Knee Extensors 5/5 5/5   Ankle Dorsiflexors 5/5 3-/5 *    Ankle Plantarflexors 5/5 2+/5    * In available RANGE OF MOTION      Gait  Ambulation: modified IND on level surfaces, pt avoids uneven surfaces.   Distance ambulated: 250+ feet   Displays the following gait deviations: antalgic  gait, decrease in stance phase on the left   Step negotiation: Mod I using step- to pattern with B hand rail for support     Balance     Single Limb Stance: Left= unable  MCTSIB: 4/4 (30/30/30/30)     Treatment:   Therapeutic Exercise Grid     Exercise 1  Exercise 2  Exercise 3  Exercise 4    Exercise :    Left ankle towel stretch  Squats (heels down) Theraband:  DF/PF  Inv/Evr Standing: heel raises   Repetition/Time :    3 x 30 sec   20 20    20   Resist or Assist :    No resistance    Red Theraband      Comment :       supported   supported   Done :    no yes yes   yes                    Exercise 5  Exercise 6  Exercise 7  Exercise 8    Exercise :    L step-ups Step-downs Balance: foam: EC STS    Repetition/Time :    FWD-20 10 30 secs x 2 x 10      Resist or Assist :    6 inch step 4 inch step NBOS No resistance     Comment :    supported supported  On foam    Done :     yes  yes  yes                      Exercise 9  Exercise 10  Exercise 11  Exercise 12    Exercise :    SLS Gait Training NUSTEP Semi-tandem   Repetition/Time :    0 secs    2 laps    5 min    3 x 30sec   Resist or Assist :    No resistance   No resistance   No resistance       Comment :       With hurrycane        Done :        unable no  no                         Exercise 13 Exercise 14  Exercise 15  Exercise 16   Exercise :    R toe taps         Repetition/Time :    20         Resist or Assist :    6 inch step         Comment :    Force FWB on L foot              Done :    yes                             ASSESSMENT     Update: Pt progressing well but slowly since initial eval. Pt now amb without AD however with slow and cautious robinson due to both L ankle pain, limited ROM and strength, and fear of falling. Pt unable to perform standing L heel raise without assistance from R due to plantar flexor weakness. Pt has difficulty with FWB on L LE while performing R LE movements and unable to L single leg stand unsupported. Pt would benefit from  continued PT to address pt's continued deficits    Previous Short Term Goals Status:   1/4    Long Term Goal Status: continue per initial plan of care.    Reasons for Recertification of Therapy:  continued L ankle deficits including ROM, strength, balance, gait abnormalities    GOALS  Short Term Goals (4 Weeks):      MET- 1. Patient will be independent with home exercise program to supplement therapy in improving functional mobility.     IN PROGRESS (3 degrees)- 2. Patient will improve dorsiflexion active range of motion with knee extended to 5 degrees to improve step through during gait.      IN PROGRESS (unable to perform)- 3. Patient will improve single leg balance duration to >/= 15 seconds  to improve balance and functional mobility .      Progressing (Modified Independent, avoids uneven surfaces)- 4. Patient will ambulate with standard cane on all surfaces x 200 +feet .      Long Term Goals (8 Weeks):      IN PROGRESS (47 improved from 34 on eval)- 1. Patient will improve FOTO score to </= 60% limited to decrease perceived limitation with mobility.      IN PROGRESS (LE 4/5 MMT, DF and PF 3-/5 MMT)-2. Patient will improve impaired lower extremity strength to >/= 4/5 to improve strength for functional tasks.     IN PROGRESS (3 degrees)-3. Patient will improve dorsiflexion active range of motion with knee extended to 10 degrees to improve gait pattern .     IN PROGRESS (uanble)-4. Patient will improve single leg balance duration to 30 seconds  to improve functional mobility and balance .      IN PROGRESS-5. Patient will report no pain during ambulation to improve gait pattern     PLAN     Updated Certification Period: TBD  Recommended Treatment Plan: 2 times per week for 6 weeks:  Gait Training, Neuromuscular Re-ed, Patient Education, Therapeutic Exercise, and pain management      Ananya Lora, PT       I CERTIFY THE NEED FOR THESE SERVICES FURNISHED UNDER THIS PLAN OF TREATMENT AND WHILE UNDER MY CARE    Physician's comments:      Physician's Signature: ___________________________________________________

## 2023-07-03 ENCOUNTER — CLINICAL SUPPORT (OUTPATIENT)
Dept: REHABILITATION | Facility: HOSPITAL | Age: 33
End: 2023-07-03
Payer: MEDICAID

## 2023-07-03 DIAGNOSIS — R26.9 GAIT ABNORMALITY: ICD-10-CM

## 2023-07-03 DIAGNOSIS — R26.89 IMPAIRMENT OF BALANCE: ICD-10-CM

## 2023-07-03 DIAGNOSIS — M25.572 LEFT ANKLE PAIN, UNSPECIFIED CHRONICITY: ICD-10-CM

## 2023-07-03 DIAGNOSIS — R29.898 DECREASED STRENGTH OF LOWER EXTREMITY: ICD-10-CM

## 2023-07-03 DIAGNOSIS — M25.672 DECREASED RANGE OF MOTION OF LEFT ANKLE: Primary | ICD-10-CM

## 2023-07-03 PROCEDURE — 97110 THERAPEUTIC EXERCISES: CPT | Mod: CQ

## 2023-07-03 NOTE — PROGRESS NOTES
[facility]   Physical Therapy Treatment Note     Name: Sharlene Ellington [facility] Number: [id number]    Therapy Diagnosis:   Encounter Diagnoses   Name Primary?    Decreased range of motion of left ankle Yes    Decreased strength of lower extremity     Impairment of balance     Gait abnormality     Left ankle pain, unspecified chronicity        Physician: Max Kraus DO    Visit Date: 7/3/2023    Physician Orders: PT EVAL and TREAT  Medical Diagnosis from Referral: S82.872A closed displaced pilon fracture of left tibia   Evaluation Date: 3/13/2023  Authorization Period Expiration: 7/10/23  Plan of Care Expiration: 6/16/23  Reassessment / POC completed: 4/12/23, 6/5/23, 6/12/2023  Visit # / Visits authorized: 4/6    PTA Visit #: 1/5     Time In: 1400  Time Out: 1430  Total Billable Time: 30 minutes    SUBJECTIVE     Pt reports: pain remains the same. Denies any falls, and has continued to ambulate without any assistive device.   She was compliant with home exercise program.  Response to previous treatment: good   Functional change: none     Pain: 4/10 with Meloxicam  Location: left ankles (medial and lateral malleolus )     OBJECTIVE     Objective Measures updated at progress report unless specified.     Treatment     Sharlene received the treatments listed below:      therapeutic exercises to develop strength, endurance, ROM, and flexibility for 30 minutes including:    Therapeutic Exercise   Therapeutic Exercise Grid     Exercise 1  Exercise 2  Exercise 3  Exercise 4    Exercise :    Left ankle towel stretch  Wedge stretch: knee bent Theraband:  DF/PF  Inv/Evr Standing: heel raises   Repetition/Time :    3 x 30 sec   3 x 20 sec 20    10   Resist or Assist :    No resistance    Red TB      Comment :            Done :    No    yes YES  yes                  Exercise 5  Exercise 6  Exercise 7  Exercise 8    Exercise :      BAPS  STS    Repetition/Time :      x 10    x 10      Resist  "or Assist :      Level 2  No resistance     Comment :      CW and CCW On foam    Done :      no no                   Exercise 9  Exercise 10  Exercise 11  Exercise 12    Exercise :    SLS Gait Training NUSTEP BOSU   Repetition/Time :    6 x 15 sec     2 laps    5 min    3 x 30 sec   Resist or Assist :    No resistance   No resistance   No resistance      Comment :       Without AD    Parallel stance   Done :    no      no No   no                     Exercise 13 Exercise 14  Exercise 15  Exercise 16   Exercise :    R toe taps  Right: Step s    Repetition/Time :    10  10    Resist or Assist :    6 inch step  6" step    Comment :    Force FWB on L foot      Fwd only  Significant use of B UE on rails         Done :    yes  yes                      Patient Education and Home Exercises     Home Exercises Provided and Patient Education Provided     Education provided:   - HEP - ankle RANGE OF MOTION     Written Home Exercises Provided: Patient instructed to cont prior HEP. Exercises were reviewed and Sharlene was able to demonstrate them prior to the end of the session.  Sharlene demonstrated good  understanding of the education provided. See EMR under Patient Instructions for exercises provided during therapy sessions    ASSESSMENT     Patient completed all exercises with good effort. Sharlene continues to have a slower, cautious gait, but is more smooth and stable. No loss of balance during balance exercises. Increased pain with toe taps due to weight bearing on L ankle.     Sharlene Is progressing well towards her goals.   Pt prognosis is Good.     Pt will continue to benefit from skilled outpatient physical therapy to address the deficits listed in the problem list box on initial evaluation, provide pt/family education and to maximize pt's level of independence in the home and community environment.     Pt's spiritual, cultural and educational needs considered and pt agreeable to plan of care and goals.     Anticipated " barriers to physical therapy: Left tibia/fibula ORIF    Goals:   Short Term Goals (4 Weeks):     MET- 1. Patient will be independent with home exercise program to supplement therapy in improving functional mobility.    IN PROGRESS- 2. Patient will improve dorsiflexion active range of motion with knee extended to 5 degrees to improve step through during gait.     IN PROGRESS- 3. Patient will improve single leg balance duration to >/= 15 seconds  to improve balance and functional mobility .     MET- 4. Patient will ambulate with standard cane on all surfaces x 200 +feet .      Long Term Goals (8 Weeks):     IN PROGRESS- 1. Patient will improve FOTO score to </= 60% limited to decrease perceived limitation with mobility.     IN PROGRESS-2. Patient will improve impaired lower extremity strength to >/= 4/5 to improve strength for functional tasks.    IN PROGRESS-3. Patient will improve dorsiflexion active range of motion with knee extended to 10 degrees to improve gait pattern .    IN PROGRESS-4. Patient will improve single leg balance duration to 30 seconds  to improve functional mobility and balance .     IN PROGRESS-5. Patient will report no pain during ambulation to improve gait pattern     PLAN      Continue 3 times per week for 2 weeks:  Gait Training, Patient Education, Therapeutic Activities, and Therapeutic Exercise    Oleksandr Arceo, PTA

## 2023-07-06 ENCOUNTER — CLINICAL SUPPORT (OUTPATIENT)
Dept: REHABILITATION | Facility: HOSPITAL | Age: 33
End: 2023-07-06
Payer: MEDICAID

## 2023-07-06 DIAGNOSIS — M25.672 DECREASED RANGE OF MOTION OF LEFT ANKLE: Primary | ICD-10-CM

## 2023-07-06 DIAGNOSIS — R26.89 IMPAIRMENT OF BALANCE: ICD-10-CM

## 2023-07-06 DIAGNOSIS — M25.572 LEFT ANKLE PAIN, UNSPECIFIED CHRONICITY: ICD-10-CM

## 2023-07-06 DIAGNOSIS — R26.9 GAIT ABNORMALITY: ICD-10-CM

## 2023-07-06 DIAGNOSIS — R29.898 DECREASED STRENGTH OF LOWER EXTREMITY: ICD-10-CM

## 2023-07-06 PROCEDURE — 97110 THERAPEUTIC EXERCISES: CPT

## 2023-07-06 NOTE — PROGRESS NOTES
[facility] [place] [facility]   Physical Therapy Treatment Note     Name: Sharlene Ellington [facility] Number: [id number]    Therapy Diagnosis:   Encounter Diagnoses   Name Primary?    Decreased range of motion of left ankle Yes    Decreased strength of lower extremity     Impairment of balance     Gait abnormality     Left ankle pain, unspecified chronicity        Physician: Max Kraus DO    Visit Date: 7/6/2023    Physician Orders: PT EVAL and TREAT  Medical Diagnosis from Referral: S82.872A closed displaced pilon fracture of left tibia   Evaluation Date: 3/13/2023  Authorization Period Expiration: 7/10/23  Plan of Care Expiration: 6/16/23  Reassessment / POC completed: 4/12/23, 6/5/23, 6/12/2023  Visit # / Visits authorized: 5/6    PTA Visit #: 1/5     Time In: 1530  Time Out: 1600  Total Billable Time: 30 minutes    SUBJECTIVE     Pt reports:   She was compliant with home exercise program.  Response to previous treatment: good   Functional change: none     Pain: 4/10 without  Meloxicam  Location: left ankles (medial and lateral malleolus )     OBJECTIVE     Objective Measures updated at progress report unless specified.     Treatment     Sharlene received the treatments listed below:      therapeutic exercises to develop strength, endurance, ROM, and flexibility for 30 minutes including:    Therapeutic Exercise   Therapeutic Exercise Grid     Exercise 1  Exercise 2  Exercise 3  Exercise 4    Exercise :    Left ankle towel stretch  Wedge stretch: knee bent Theraband:  DF/PF  Inv/Evr Standing: heel raises   Repetition/Time :    3 x 30 sec   3 x 20 sec 20    10   Resist or Assist :    No resistance    Red TB      Comment :            Done :        YES                    Exercise 5  Exercise 6  Exercise 7  Exercise 8    Exercise :    Ball Rolls  Soccer   -R kick  -R receive/kick  -R receive/pass/L kick BAPS  STS    Repetition/Time :    X 20  10 each x 10    x 10      Resist or Assist :    Hand ball    "Level 2  No resistance     Comment :    A/P  LAT  CW and CCW On foam    Done :    YES  YES                      Exercise 9  Exercise 10  Exercise 11  Exercise 12    Exercise :    SLS Gait Training NUSTEP BOSU   Repetition/Time :    6 x 15 sec     2 laps    5 min    3 x 30 sec   Resist or Assist :    No resistance   No resistance   No resistance      Comment :       Without AD    Parallel stance   Done :                           Exercise 13 Exercise 14  Exercise 15  Exercise 16   Exercise :    R toe taps Step ups  Fwd/dwn/lat Right: Step s    Repetition/Time :    10 20 10    Resist or Assist :    6 inch step 6 inch   No support 6" step    Comment :    Force FWB on L foot   Left    Fwd only  Significant use of B UE on rails         Done :     YES                    Patient Education and Home Exercises     Home Exercises Provided and Patient Education Provided     Education provided:   - HEP - ankle RANGE OF MOTION     Written Home Exercises Provided: Patient instructed to cont prior HEP. Exercises were reviewed and Sharlene was able to demonstrate them prior to the end of the session.  Sharlene demonstrated good  understanding of the education provided. See EMR under Patient Instructions for exercises provided during therapy sessions    ASSESSMENT     Sharlene tolerated treatment well today completing all assigned exercises with good effort and minimal reports of discomfort during activities. Patient required moderate VC for exercise execution specifically for maintaining upright trunk and wt shifting to Left lower extremity. During soccer kicks patient required single UE support to maintain balance. Noted patient ambulated with improved stability during activity.     Sharlene Is progressing well towards her goals.   Pt prognosis is Good.     Pt will continue to benefit from skilled outpatient physical therapy to address the deficits listed in the problem list box on initial evaluation, provide pt/family education and to " maximize pt's level of independence in the home and community environment.     Pt's spiritual, cultural and educational needs considered and pt agreeable to plan of care and goals.     Anticipated barriers to physical therapy: Left tibia/fibula ORIF    Goals:   Short Term Goals (4 Weeks):     MET- 1. Patient will be independent with home exercise program to supplement therapy in improving functional mobility.    IN PROGRESS- 2. Patient will improve dorsiflexion active range of motion with knee extended to 5 degrees to improve step through during gait.     IN PROGRESS- 3. Patient will improve single leg balance duration to >/= 15 seconds  to improve balance and functional mobility .     MET- 4. Patient will ambulate with standard cane on all surfaces x 200 +feet .      Long Term Goals (8 Weeks):     IN PROGRESS- 1. Patient will improve FOTO score to </= 60% limited to decrease perceived limitation with mobility.     IN PROGRESS-2. Patient will improve impaired lower extremity strength to >/= 4/5 to improve strength for functional tasks.    IN PROGRESS-3. Patient will improve dorsiflexion active range of motion with knee extended to 10 degrees to improve gait pattern .    IN PROGRESS-4. Patient will improve single leg balance duration to 30 seconds  to improve functional mobility and balance .     IN PROGRESS-5. Patient will report no pain during ambulation to improve gait pattern     PLAN      Continue 3 times per week for 2 weeks:  Gait Training, Patient Education, Therapeutic Activities, and Therapeutic Exercise    Keith Guerin, PT

## 2023-07-10 ENCOUNTER — CLINICAL SUPPORT (OUTPATIENT)
Dept: REHABILITATION | Facility: HOSPITAL | Age: 33
End: 2023-07-10
Payer: MEDICAID

## 2023-07-10 DIAGNOSIS — R26.89 IMPAIRMENT OF BALANCE: ICD-10-CM

## 2023-07-10 DIAGNOSIS — R26.9 GAIT ABNORMALITY: ICD-10-CM

## 2023-07-10 DIAGNOSIS — R29.898 DECREASED STRENGTH OF LOWER EXTREMITY: ICD-10-CM

## 2023-07-10 DIAGNOSIS — M25.572 LEFT ANKLE PAIN, UNSPECIFIED CHRONICITY: ICD-10-CM

## 2023-07-10 DIAGNOSIS — M25.672 DECREASED RANGE OF MOTION OF LEFT ANKLE: Primary | ICD-10-CM

## 2023-07-10 PROCEDURE — 97110 THERAPEUTIC EXERCISES: CPT

## 2023-07-10 NOTE — PROGRESS NOTES
[facility] OUTPAT[facility]ERAP[facility] AND [facility]LL[facility]   Physical Therapy Treatment Note     Name: Sharlene Ellington [facility] Number: [id number]    Therapy Diagnosis:   No diagnosis found.      Physician: Max Kraus DO    Visit Date: 7/10/2023    Physician Orders: PT EVAL and TREAT  Medical Diagnosis from Referral: S82.872A closed displaced pilon fracture of left tibia   Evaluation Date: 3/13/2023  Authorization Period Expiration: 7/10/23  Plan of Care Expiration: 6/16/23  Reassessment / POC completed: 4/12/23, 6/5/23, 6/12/2023  Visit # / Visits authorized: 6/6    PTA Visit #: 1/5     Time In: 0900  Time Out: 0930    Total Billable Time: 30 minutes    SUBJECTIVE     Pt reports: that overall she is doing better. Main concerns are functional movements involving the left ankle (getting in and out of tub)  She was compliant with home exercise program.  Response to previous treatment: good   Functional change: improved ambulation     Pain: 4/10 with Meloxicam  Location: left ankles (medial and lateral malleolus )     OBJECTIVE     Objective Measures updated at progress report unless specified.     Treatment     Sharlene received the treatments listed below:      therapeutic exercises to develop strength, endurance, ROM, and flexibility for 30 minutes including:    Therapeutic Exercise   Therapeutic Exercise Grid     Exercise 1  Exercise 2  Exercise 3  Exercise 4    Exercise :    Left ankle towel stretch  Wedge stretch: knee bent Theraband:  DF/PF  Inv/Evr Standing: heel raises   Repetition/Time :    3 x 30 sec   3 x 20 sec 20    2 x 10   Resist or Assist :    No resistance    Red TB      Comment :         Eccentric   Done :         YES                   Exercise 5  Exercise 6  Exercise 7  Exercise 8    Exercise :    Ball Rolls  Soccer   -R kick  -R receive/kick  -R receive/pass/L kick BAPS  STS    Repetition/Time :    X 20  10 each x 10    x 10      Resist or Assist :    Hand ball   Level 2  No resistance     Comment :     "A/P  LAT  CW and CCW On foam    Done :                          Exercise 9  Exercise 10  Exercise 11  Exercise 12    Exercise :    SLS Ladder drills NUSTEP BOSU   Repetition/Time :    6 x 15 sec     5 drills 5 min    3 x 30 sec   Resist or Assist :    No resistance    No resistance      Comment :           Parallel stance   Done :     YES                       Exercise 13 Exercise 14  Exercise 15  Exercise 16   Exercise :    R toe taps Step ups  Fwd/dwn/lat Right: Step s Toes raises    Repetition/Time :    10 20 10 Bilateral -2 x 10  L- 1 x 10    Resist or Assist :    6 inch step 6 inch   No support 6" step    Comment :    Force FWB on L foot   Left    Fwd only  Significant use of B UE on rails         Done :        YES                  Patient Education and Home Exercises     Home Exercises Provided and Patient Education Provided     Education provided:   - HEP - ankle RANGE OF MOTION     Written Home Exercises Provided: Patient instructed to cont prior HEP. Exercises were reviewed and Sharlene was able to demonstrate them prior to the end of the session.  Sharlene demonstrated good  understanding of the education provided. See EMR under Patient Instructions for exercises provided during therapy sessions    ASSESSMENT     Sharlene tolerated treatment fair today reporting minimal reports of discomfort during session. Patient requires moderate VC for exercise execution specifically to encourage full wt bearing on the left. Noted improved left ankle dorsiflexion with increased reps. Patient demonstrated moderate difficulty with single leg exercises on left. Sharlene's gait pattern showed increased stance phase on left .    Sharlene Is progressing well towards her goals.   Pt prognosis is Good.     Pt will continue to benefit from skilled outpatient physical therapy to address the deficits listed in the problem list box on initial evaluation, provide pt/family education and to maximize pt's level of independence in the home and " community environment.     Pt's spiritual, cultural and educational needs considered and pt agreeable to plan of care and goals.     Anticipated barriers to physical therapy: Left tibia/fibula ORIF    Goals:   Short Term Goals (4 Weeks):     MET- 1. Patient will be independent with home exercise program to supplement therapy in improving functional mobility.    IN PROGRESS- 2. Patient will improve dorsiflexion active range of motion with knee extended to 5 degrees to improve step through during gait.     IN PROGRESS- 3. Patient will improve single leg balance duration to >/= 15 seconds  to improve balance and functional mobility .     MET- 4. Patient will ambulate with standard cane on all surfaces x 200 +feet .      Long Term Goals (8 Weeks):     IN PROGRESS- 1. Patient will improve FOTO score to </= 60% limited to decrease perceived limitation with mobility.     IN PROGRESS-2. Patient will improve impaired lower extremity strength to >/= 4/5 to improve strength for functional tasks.    IN PROGRESS-3. Patient will improve dorsiflexion active range of motion with knee extended to 10 degrees to improve gait pattern .    IN PROGRESS-4. Patient will improve single leg balance duration to 30 seconds  to improve functional mobility and balance .     IN PROGRESS-5. Patient will report no pain during ambulation to improve gait pattern     PLAN      Continue to progress therapy when authorization is approved :  Gait Training, Patient Education, Therapeutic Activities, and Therapeutic Exercise    Keith Guerin, PT

## 2023-07-31 ENCOUNTER — LAB VISIT (OUTPATIENT)
Dept: LAB | Facility: HOSPITAL | Age: 33
End: 2023-07-31
Attending: INTERNAL MEDICINE
Payer: MEDICAID

## 2023-07-31 DIAGNOSIS — Z91.199 NON-COMPLIANCE: ICD-10-CM

## 2023-07-31 DIAGNOSIS — M05.79 RHEUMATOID ARTHRITIS INVOLVING MULTIPLE SITES WITH POSITIVE RHEUMATOID FACTOR: ICD-10-CM

## 2023-07-31 DIAGNOSIS — Z79.899 LONG-TERM USE OF PLAQUENIL: ICD-10-CM

## 2023-07-31 PROCEDURE — 86480 TB TEST CELL IMMUN MEASURE: CPT

## 2023-07-31 PROCEDURE — 36415 COLL VENOUS BLD VENIPUNCTURE: CPT

## 2023-08-01 ENCOUNTER — DOCUMENTATION ONLY (OUTPATIENT)
Dept: REHABILITATION | Facility: HOSPITAL | Age: 33
End: 2023-08-01
Payer: MEDICAID

## 2023-08-01 NOTE — PROGRESS NOTES
"[facility]  Physical Therapy Discharge Note    Name: Sharlene Ellington [facility] Number: [id number]    Physician:No ref. provider found    Physician Orders: PT EVAL and TREAT  Medical Diagnosis from Referral: S82.872A closed displaced pilon fracture of left tibia   Evaluation Date: 3/13/2023  Authorization Period Expiration: 7/10/23  Plan of Care Expiration: 6/16/23  Reassessment / POC completed: 4/12/23, 6/5/23, 6/12/2023  Visit # / Visits authorized: 6/6  Date of Last visit: 7/10/23    TREATMENT :   Therapeutic Exercise Grid     Exercise 1  Exercise 2  Exercise 3  Exercise 4    Exercise :    Left ankle towel stretch  Wedge stretch: knee bent Theraband:  DF/PF  Inv/Evr Standing: heel raises   Repetition/Time :    3 x 30 sec   3 x 20 sec 20    2 x 10   Resist or Assist :    No resistance     Red TB       Comment :           Eccentric   Done :                                Exercise 5  Exercise 6  Exercise 7  Exercise 8    Exercise :    Ball Rolls  Soccer   -R kick  -R receive/kick  -R receive/pass/L kick BAPS  STS    Repetition/Time :    X 20  10 each x 10    x 10      Resist or Assist :    Hand ball    Level 2  No resistance     Comment :    A/P  LAT   CW and CCW On foam    Done :                               Exercise 9  Exercise 10  Exercise 11  Exercise 12    Exercise :    SLS Ladder drills NUSTEP BOSU   Repetition/Time :    6 x 15 sec     5 drills 5 min    3 x 30 sec   Resist or Assist :    No resistance     No resistance       Comment :            Parallel stance   Done :                                  Exercise 13 Exercise 14  Exercise 15  Exercise 16   Exercise :    R toe taps Step ups  Fwd/dwn/lat Right: Step s Toes raises    Repetition/Time :    10 20 10 Bilateral -2 x 10  L- 1 x 10    Resist or Assist :    6 inch step 6 inch   No support 6" step     Comment :    Force FWB on L foot   Left    Fwd only  Significant use of B UE on rails         Done :                        "        ASSESSMENT      Sharlene Tong presented to physical therapy on 07/10/2023 for final visit of authorized duration. .  Sharlene has made fair progress with therapy. Sharlene has not met all goals set at initial evaluation, unless listed below, and will continue to work at home towards personal goal(s) . Sharlene is independent with home exercise program and was given handouts throughout this episode of care to reference for continued wellness and physical fitness .   Additional Info:   Contact information was given to patient in case any questions arise in the future or if therapy is needed.      Discharge reason: Patient has completed allowable visits authorized by insurance     FOTO Score:   Initial = 33  Discharge = 52    Goals:     Goals:   Short Term Goals (4 Weeks):      MET- 1. Patient will be independent with home exercise program to supplement therapy in improving functional mobility.     IN PROGRESS- 2. Patient will improve dorsiflexion active range of motion with knee extended to 5 degrees to improve step through during gait.      IN PROGRESS- 3. Patient will improve single leg balance duration to >/= 15 seconds  to improve balance and functional mobility .      MET- 4. Patient will ambulate with standard cane on all surfaces x 200 +feet .      Long Term Goals (8 Weeks):      IN PROGRESS- 1. Patient will improve FOTO score to </= 60% limited to decrease perceived limitation with mobility.      IN PROGRESS-2. Patient will improve impaired lower extremity strength to >/= 4/5 to improve strength for functional tasks.     IN PROGRESS-3. Patient will improve dorsiflexion active range of motion with knee extended to 10 degrees to improve gait pattern .     IN PROGRESS-4. Patient will improve single leg balance duration to 30 seconds  to improve functional mobility and balance .      IN PROGRESS-5. Patient will report no pain during ambulation to improve gait pattern                PLAN   This  patient is discharged from Physical Therapy.       Keith Guerin, PT

## 2023-08-02 LAB
GAMMA INTERFERON BACKGROUND BLD IA-ACNC: 0.05 IU/ML
M TB IFN-G BLD-IMP: NEGATIVE
M TB IFN-G CD4+ BCKGRND COR BLD-ACNC: 0 IU/ML
M TB IFN-G CD4+CD8+ BCKGRND COR BLD-ACNC: 0 IU/ML
MITOGEN IGNF BCKGRD COR BLD-ACNC: 9.95 IU/ML

## 2023-08-08 ENCOUNTER — TELEPHONE (OUTPATIENT)
Dept: PHARMACY | Facility: CLINIC | Age: 33
End: 2023-08-08
Payer: MEDICAID

## 2023-08-08 NOTE — TELEPHONE ENCOUNTER
Jesu, this is Niki Trevino, clinical pharmacist with [facility] that is part of your care team.  We have begun working on your prescription that your doctor has sent us. Our next steps include:     Working with your insurance company to obtain approval for your medication  Working with you to ensure your medication is affordable     We will be calling you along the way with updates on your medication but if you have any concerns or receive information that you would like to discuss please reach us at [phone].    Welcome call outcome: Patient/caregiver reached

## 2023-08-09 ENCOUNTER — SPECIALTY PHARMACY (OUTPATIENT)
Dept: PHARMACY | Facility: CLINIC | Age: 33
End: 2023-08-09
Payer: MEDICAID

## 2023-08-09 DIAGNOSIS — M06.9 RHEUMATOID ARTHRITIS, INVOLVING UNSPECIFIED SITE, UNSPECIFIED WHETHER RHEUMATOID FACTOR PRESENT: Primary | ICD-10-CM

## 2023-08-09 NOTE — TELEPHONE ENCOUNTER
Specialty Pharmacy - Initial Clinical Assessment    Specialty Medication Orders Linked to Encounter      Flowsheet Row Most Recent Value   Medication #1 adalimumab (HUMIRA,CF, PEN) 40 mg/0.4 mL PnKt (Order#[id number], Rx#[id number])          Patient Diagnosis   M06.9 - Rheumatoid arthritis, involving unspecified site, unspecified whether rheumatoid factor present    Subjective    Sharlene Tong is a 33 y.o. female, who is followed by the specialty pharmacy service for management and education.    Recent Encounters       Date Type Provider Description    08/09/2023 Specialty Pharmacy Melvi Quintero PharmD Initial Clinical Assessment    08/09/2023 Specialty Pharmacy Melvi Quintero PharmD Referral Authorization            Current Outpatient Medications   Medication Sig    acetaminophen (TYLENOL) 325 MG tablet Take 325 mg by mouth every 6 (six) hours as needed for Pain.    adalimumab (HUMIRA,CF, PEN) 40 mg/0.4 mL PnKt Inject 0.4 mL (40 mg total) into the skin every 14 (fourteen) days.    aspirin (ECOTRIN) 81 MG EC tablet Take 1 tablet (81 mg total) by mouth 2 (two) times a day. (Patient not taking: Reported on 4/3/2023)    cyclobenzaprine (FLEXERIL) 5 MG tablet TAKE 1 TABLET BY MOUTH THREE TIMES A DAY AS NEEDED FOR MUSCLE SPASMS    ergocalciferol (ERGOCALCIFEROL) 50,000 unit Cap Take 1 capsule (50,000 Units total) by mouth every 7 days.    hydrOXYchloroQUINE (PLAQUENIL) 200 mg tablet Take 2 tablets (400 mg total) by mouth once daily.    medroxyPROGESTERone (DEPO-PROVERA) 150 mg/mL injection     meloxicam (MOBIC) 15 MG tablet Take 1 tablet (15 mg total) by mouth once daily.    XIIDRA 5 % Dpet Place 1 drop into both eyes 2 (two) times daily.   Last reviewed on 8/9/2023  3:33 PM by Melvi Quintero, PharmD    Review of patient's allergies indicates:   Allergen Reactions    Aloe vera Itching    Hydrocodone-acetaminophen Itching   Last reviewed on  8/9/2023 3:33 PM by Melvi Quintero          Assessment Questions -  Documented Responses      Flowsheet Row Most Recent Value   Assessment    Medication Reconciliation completed for patient Yes   During the past 4 weeks, has patient missed any activities due to condition or medication? No   During the past 4 weeks, did patient have any of the following urgent care visits? None   Goals of Therapy Status Discussed (new start)   Status of the patients ability to self-administer: Is Able   All education points have been covered with patient? Yes, supplemental printed education provided   Welcome packet contents reviewed and discussed with patient? Yes   Assesment completed? Yes   Plan Therapy being initiated   Do you need to open a clinical intervention (i-vent)? No   Do you want to schedule first shipment? Yes   Medication #1 Assessment Info    Patient status New medication, New to [facility]   Is this medication appropriate for the patient? Yes   Is this medication effective? Not yet started          Refill Questions - Documented Responses      Flowsheet Row Most Recent Value   Patient Availability and HIPAA Verification    Does patient want to proceed with activity? Yes   HIPAA/medical authority confirmed? Yes   Relationship to patient of person spoken to? Self   Refill Screening Questions    When does the patient need to receive the medication? 08/10/23   Refill Delivery Questions    How will the patient receive the medication? MEDRx   When does the patient need to receive the medication? 08/10/23   Shipping Address Home   Address in [facility] confirmed and updated if neccessary? Yes   Expected Copay ($) 3   Is the patient able to afford the medication copay? Yes   Payment Method CC on file   Days supply of Refill 28   Supplies needed? No supplies needed   Refill activity completed? Yes   Refill activity plan Refill scheduled   Shipment/Pickup Date: 08/10/23            Objective    She has a past medical history of Arthritis.    Tried/failed medications: plaquenil    BP Readings  "from Last 4 Encounters:   07/25/23 112/66   04/14/23 104/72   03/09/23 116/69   03/01/23 109/71     Ht Readings from Last 4 Encounters:   07/25/23 5' 4" (1.626 m)   04/14/23 5' 4" (1.626 m)   03/09/23 5' 2" (1.575 m)   03/01/23 5' 2" (1.575 m)     Wt Readings from Last 4 Encounters:   07/25/23 85 kg (187 lb 4.8 oz)   04/14/23 81.6 kg (180 lb)   03/09/23 81.7 kg (180 lb 3.2 oz)   03/01/23 81.2 kg (179 lb)       The goals of rheumatoid arthritis treatment include:  Relieving pain and suppressing inflammation  Achieving remission and preventing joint and organ damage  Increasing joint mobility and strength  Preventing infection and other complications of treatment  Reducing long term complications of rheumatoid arthritis  Improving or maintaining physical function and optimal well-being  Improving or maintaining quality of life  Maintaining optimal therapy adherence  Minimizing and managing side effects    Goals of Therapy Status: Discussed (new start)    Assessment/Plan  Patient plans to start therapy on 08/10/23      Indication, dosage, appropriateness, effectiveness, safety and convenience of her specialty medication(s) were reviewed today.     Patient Education   Patient received education on the following:   Expectations and possible outcomes of therapy  Proper use, timely administration, and missed dose management  Duration of therapy  Side effects, including prevention, minimization, and management  Contraindications and safety precautions  New or changed medications, including prescribe and over the counter medications and supplements  Reviews recommended vaccinations, as appropriate  Storage, safe handling, and disposal        Tasks added this encounter   5/9/2024 - Clinical Assessment (1 year recurrence)   Tasks due within next 3 months   No tasks due.     Melvi Quintero, PharmD  [facility]  [address] [facility] [address]  Phone: [phone]  Fax: [phone]  "

## 2023-08-09 NOTE — TELEPHONE ENCOUNTER
PA approved for Humira from 8/8/23-2/4/24. Copay $3.00. Pt wanted it transferred to a local [facility] will call the pt to see where she wants to fill it.

## 2023-10-26 ENCOUNTER — PATIENT MESSAGE (OUTPATIENT)
Dept: ADMINISTRATIVE | Facility: OTHER | Age: 33
End: 2023-10-26
Payer: MEDICAID

## 2023-12-18 ENCOUNTER — LAB VISIT (OUTPATIENT)
Dept: LAB | Facility: HOSPITAL | Age: 33
End: 2023-12-18
Attending: INTERNAL MEDICINE
Payer: MEDICAID

## 2023-12-18 DIAGNOSIS — M05.79 RHEUMATOID ARTHRITIS INVOLVING MULTIPLE SITES WITH POSITIVE RHEUMATOID FACTOR: Primary | ICD-10-CM

## 2023-12-18 LAB
ALBUMIN SERPL-MCNC: 4.3 G/DL (ref 3.5–5)
ALP SERPL-CCNC: 80 UNIT/L (ref 40–150)
ALT SERPL-CCNC: 9 UNIT/L (ref 0–55)
AST SERPL-CCNC: 9 UNIT/L (ref 5–34)
BASOPHILS # BLD AUTO: 0.02 X10(3)/MCL
BASOPHILS NFR BLD AUTO: 0.1 %
BILIRUB SERPL-MCNC: 0.3 MG/DL
BILIRUBIN DIRECT+TOT PNL SERPL-MCNC: <0.1 MG/DL (ref 0–?)
BILIRUBIN DIRECT+TOT PNL SERPL-MCNC: >0.2 MG/DL (ref 0–0.8)
BUN SERPL-MCNC: 13.2 MG/DL (ref 7–18.7)
CREAT SERPL-MCNC: 1 MG/DL (ref 0.55–1.02)
CRP SERPL-MCNC: <1 MG/L
EOSINOPHIL # BLD AUTO: 0.01 X10(3)/MCL (ref 0–0.9)
EOSINOPHIL NFR BLD AUTO: 0.1 %
ERYTHROCYTE [DISTWIDTH] IN BLOOD BY AUTOMATED COUNT: 12 % (ref 11.5–17)
ERYTHROCYTE [SEDIMENTATION RATE] IN BLOOD: 16 MM/HR (ref 0–20)
GFR SERPLBLD CREATININE-BSD FMLA CKD-EPI: >60 MLS/MIN/1.73/M2
HCT VFR BLD AUTO: 40.1 % (ref 37–47)
HGB BLD-MCNC: 13.4 G/DL (ref 12–16)
IMM GRANULOCYTES # BLD AUTO: 0.13 X10(3)/MCL (ref 0–0.04)
IMM GRANULOCYTES NFR BLD AUTO: 0.7 %
LYMPHOCYTES # BLD AUTO: 1.53 X10(3)/MCL (ref 0.6–4.6)
LYMPHOCYTES NFR BLD AUTO: 8.6 %
MCH RBC QN AUTO: 32.3 PG (ref 27–31)
MCHC RBC AUTO-ENTMCNC: 33.4 G/DL (ref 33–36)
MCV RBC AUTO: 96.6 FL (ref 80–94)
MONOCYTES # BLD AUTO: 1.25 X10(3)/MCL (ref 0.1–1.3)
MONOCYTES NFR BLD AUTO: 7 %
NEUTROPHILS # BLD AUTO: 14.91 X10(3)/MCL (ref 2.1–9.2)
NEUTROPHILS NFR BLD AUTO: 83.5 %
NRBC BLD AUTO-RTO: 0 %
PLATELET # BLD AUTO: 256 X10(3)/MCL (ref 130–400)
PLATELETS.RETICULATED NFR BLD AUTO: 3.9 % (ref 0.9–11.2)
PMV BLD AUTO: 10.9 FL (ref 7.4–10.4)
PROT SERPL-MCNC: 7.3 GM/DL (ref 6.4–8.3)
RBC # BLD AUTO: 4.15 X10(6)/MCL (ref 4.2–5.4)
WBC # SPEC AUTO: 17.85 X10(3)/MCL (ref 4.5–11.5)

## 2023-12-18 PROCEDURE — 36415 COLL VENOUS BLD VENIPUNCTURE: CPT

## 2023-12-18 PROCEDURE — 85025 COMPLETE CBC W/AUTO DIFF WBC: CPT

## 2023-12-18 PROCEDURE — 82565 ASSAY OF CREATININE: CPT

## 2023-12-18 PROCEDURE — 80076 HEPATIC FUNCTION PANEL: CPT

## 2023-12-18 PROCEDURE — 86140 C-REACTIVE PROTEIN: CPT

## 2023-12-18 PROCEDURE — 84520 ASSAY OF UREA NITROGEN: CPT

## 2023-12-18 PROCEDURE — 85652 RBC SED RATE AUTOMATED: CPT

## 2023-12-19 LAB — PATH REV: NORMAL

## 2024-01-03 ENCOUNTER — NURSE TRIAGE (OUTPATIENT)
Dept: ADMINISTRATIVE | Facility: CLINIC | Age: 34
End: 2024-01-03
Payer: MEDICAID

## 2024-01-03 PROBLEM — M05.79 SEROPOSITIVE RHEUMATOID ARTHRITIS OF MULTIPLE SITES: Status: ACTIVE | Noted: 2024-01-03

## 2024-01-03 NOTE — TELEPHONE ENCOUNTER
Pt calls asking if she should hold off on taking her humira shot because she has been sick and people around her are sick. NT attempts to reach OCP for [facility] but there is no listed as on-call in the On Call Finder today. Pt is advised to call her pharmacist for further guidance while she awaits callback from her rheumatologist. Pt verbalizes understanding and is instructed to call back with any new/worsening sxs, questions, or concerns.   Reason for Disposition   [1] Caller has URGENT medicine question about med that PCP or specialist prescribed AND [2] triager unable to answer question    Additional Information   Negative: [1] Intentional drug overdose AND [2] suicidal thoughts or ideas   Negative: MORE THAN A DOUBLE DOSE of a prescription or over-the-counter (OTC) drug   Negative: [1] DOUBLE DOSE (an extra dose or lesser amount) of prescription drug AND [2] any symptoms (e.g., dizziness, nausea, pain, sleepiness)   Negative: [1] DOUBLE DOSE (an extra dose or lesser amount) of over-the-counter (OTC) drug AND [2] any symptoms (e.g., dizziness, nausea, pain, sleepiness)   Negative: Took another person's prescription drug   Negative: [1] DOUBLE DOSE (an extra dose or lesser amount) of prescription drug AND [2] NO symptoms  (Exception: A double dose of antibiotics.)   Negative: Diabetes drug error or overdose (e.g., took wrong type of insulin or took extra dose)   Negative: [1] Prescription not at pharmacy AND [2] was prescribed by PCP recently (Exception: Triager has access to EMR and prescription is recorded there. Go to Home Care and confirm for pharmacy.)   Negative: [1] Pharmacy calling with prescription question AND [2] triager unable to answer question    Protocols used: Medication Question Call-A-

## 2024-01-22 ENCOUNTER — PATIENT MESSAGE (OUTPATIENT)
Dept: ADMINISTRATIVE | Facility: OTHER | Age: 34
End: 2024-01-22
Payer: MEDICAID

## 2024-05-20 ENCOUNTER — LAB VISIT (OUTPATIENT)
Dept: LAB | Facility: HOSPITAL | Age: 34
End: 2024-05-20
Attending: INTERNAL MEDICINE
Payer: MEDICAID

## 2024-05-20 DIAGNOSIS — Z79.620 ADALIMUMAB (HUMIRA) LONG-TERM USE: ICD-10-CM

## 2024-05-20 DIAGNOSIS — E55.9 VITAMIN D DEFICIENCY: ICD-10-CM

## 2024-05-20 LAB
25(OH)D3+25(OH)D2 SERPL-MCNC: 20 NG/ML (ref 30–80)
ALBUMIN SERPL-MCNC: 4 G/DL (ref 3.5–5)
ALP SERPL-CCNC: 86 UNIT/L (ref 40–150)
ALT SERPL-CCNC: 10 UNIT/L (ref 0–55)
AST SERPL-CCNC: 10 UNIT/L (ref 5–34)
BASOPHILS # BLD AUTO: 0.04 X10(3)/MCL
BASOPHILS NFR BLD AUTO: 0.4 %
BILIRUB DIRECT SERPL-MCNC: <0.1 MG/DL (ref 0–?)
BILIRUB SERPL-MCNC: 0.2 MG/DL
BILIRUBIN DIRECT+TOT PNL SERPL-MCNC: >0.1 MG/DL (ref 0–0.8)
BUN SERPL-MCNC: 8.9 MG/DL (ref 7–18.7)
CREAT SERPL-MCNC: 0.76 MG/DL (ref 0.55–1.02)
CRP SERPL-MCNC: 1.3 MG/L
EOSINOPHIL # BLD AUTO: 0.14 X10(3)/MCL (ref 0–0.9)
EOSINOPHIL NFR BLD AUTO: 1.4 %
ERYTHROCYTE [DISTWIDTH] IN BLOOD BY AUTOMATED COUNT: 11.8 % (ref 11.5–17)
ERYTHROCYTE [SEDIMENTATION RATE] IN BLOOD: 6 MM/HR (ref 0–20)
GFR SERPLBLD CREATININE-BSD FMLA CKD-EPI: >60 ML/MIN/1.73/M2
HCT VFR BLD AUTO: 39.8 % (ref 37–47)
HGB BLD-MCNC: 12.9 G/DL (ref 12–16)
IMM GRANULOCYTES # BLD AUTO: 0.03 X10(3)/MCL (ref 0–0.04)
IMM GRANULOCYTES NFR BLD AUTO: 0.3 %
LYMPHOCYTES # BLD AUTO: 2.85 X10(3)/MCL (ref 0.6–4.6)
LYMPHOCYTES NFR BLD AUTO: 28.9 %
MCH RBC QN AUTO: 31.7 PG (ref 27–31)
MCHC RBC AUTO-ENTMCNC: 32.4 G/DL (ref 33–36)
MCV RBC AUTO: 97.8 FL (ref 80–94)
MONOCYTES # BLD AUTO: 0.81 X10(3)/MCL (ref 0.1–1.3)
MONOCYTES NFR BLD AUTO: 8.2 %
NEUTROPHILS # BLD AUTO: 5.99 X10(3)/MCL (ref 2.1–9.2)
NEUTROPHILS NFR BLD AUTO: 60.8 %
NRBC BLD AUTO-RTO: 0 %
PATH REV: NORMAL
PLATELET # BLD AUTO: 229 X10(3)/MCL (ref 130–400)
PMV BLD AUTO: 10.5 FL (ref 7.4–10.4)
PROT SERPL-MCNC: 6.8 GM/DL (ref 6.4–8.3)
RBC # BLD AUTO: 4.07 X10(6)/MCL (ref 4.2–5.4)
WBC # SPEC AUTO: 9.86 X10(3)/MCL (ref 4.5–11.5)

## 2024-05-20 PROCEDURE — 86140 C-REACTIVE PROTEIN: CPT

## 2024-05-20 PROCEDURE — 84520 ASSAY OF UREA NITROGEN: CPT

## 2024-05-20 PROCEDURE — 80076 HEPATIC FUNCTION PANEL: CPT

## 2024-05-20 PROCEDURE — 36415 COLL VENOUS BLD VENIPUNCTURE: CPT

## 2024-05-20 PROCEDURE — 85025 COMPLETE CBC W/AUTO DIFF WBC: CPT

## 2024-05-20 PROCEDURE — 85652 RBC SED RATE AUTOMATED: CPT

## 2024-05-20 PROCEDURE — 82565 ASSAY OF CREATININE: CPT

## 2024-05-20 PROCEDURE — 82306 VITAMIN D 25 HYDROXY: CPT

## 2024-05-27 PROBLEM — M05.79 SEROPOSITIVE RHEUMATOID ARTHRITIS OF MULTIPLE SITES: Status: RESOLVED | Noted: 2024-01-03 | Resolved: 2024-05-27

## 2024-05-27 PROBLEM — M05.79 RHEUMATOID ARTHRITIS INVOLVING MULTIPLE SITES WITH POSITIVE RHEUMATOID FACTOR: Status: ACTIVE | Noted: 2024-05-27

## 2024-05-27 PROBLEM — Z79.899 LONG-TERM USE OF PLAQUENIL: Status: ACTIVE | Noted: 2024-05-27

## 2024-05-27 PROBLEM — D84.9 IMMUNOCOMPROMISED: Status: ACTIVE | Noted: 2024-05-27

## 2024-05-27 PROBLEM — Z79.620 ADALIMUMAB (HUMIRA) LONG-TERM USE: Status: ACTIVE | Noted: 2024-05-27

## 2024-09-30 ENCOUNTER — LAB VISIT (OUTPATIENT)
Dept: LAB | Facility: HOSPITAL | Age: 34
End: 2024-09-30
Attending: INTERNAL MEDICINE
Payer: MEDICAID

## 2024-09-30 DIAGNOSIS — M05.79 RHEUMATOID ARTHRITIS INVOLVING MULTIPLE SITES WITH POSITIVE RHEUMATOID FACTOR: ICD-10-CM

## 2024-09-30 LAB
ALBUMIN SERPL-MCNC: 4 G/DL (ref 3.5–5)
ALBUMIN/GLOB SERPL: 1.4 RATIO (ref 1.1–2)
ALP SERPL-CCNC: 86 UNIT/L (ref 40–150)
ALT SERPL-CCNC: 10 UNIT/L (ref 0–55)
ANION GAP SERPL CALC-SCNC: 7 MEQ/L
AST SERPL-CCNC: 11 UNIT/L (ref 5–34)
BASOPHILS # BLD AUTO: 0.03 X10(3)/MCL
BASOPHILS NFR BLD AUTO: 0.3 %
BILIRUB SERPL-MCNC: 0.4 MG/DL
BUN SERPL-MCNC: 9.7 MG/DL (ref 7–18.7)
CALCIUM SERPL-MCNC: 9.2 MG/DL (ref 8.4–10.2)
CHLORIDE SERPL-SCNC: 108 MMOL/L (ref 98–107)
CO2 SERPL-SCNC: 23 MMOL/L (ref 22–29)
CREAT SERPL-MCNC: 0.78 MG/DL (ref 0.55–1.02)
CREAT/UREA NIT SERPL: 12
CRP SERPL-MCNC: 1.3 MG/L
EOSINOPHIL # BLD AUTO: 0.15 X10(3)/MCL (ref 0–0.9)
EOSINOPHIL NFR BLD AUTO: 1.6 %
ERYTHROCYTE [DISTWIDTH] IN BLOOD BY AUTOMATED COUNT: 12 % (ref 11.5–17)
ERYTHROCYTE [SEDIMENTATION RATE] IN BLOOD: 31 MM/HR (ref 0–20)
GFR SERPLBLD CREATININE-BSD FMLA CKD-EPI: >60 ML/MIN/1.73/M2
GLOBULIN SER-MCNC: 2.8 GM/DL (ref 2.4–3.5)
GLUCOSE SERPL-MCNC: 89 MG/DL (ref 74–100)
HCT VFR BLD AUTO: 37.4 % (ref 37–47)
HGB BLD-MCNC: 12.5 G/DL (ref 12–16)
IMM GRANULOCYTES # BLD AUTO: 0.04 X10(3)/MCL (ref 0–0.04)
IMM GRANULOCYTES NFR BLD AUTO: 0.4 %
LYMPHOCYTES # BLD AUTO: 2.71 X10(3)/MCL (ref 0.6–4.6)
LYMPHOCYTES NFR BLD AUTO: 28.5 %
MCH RBC QN AUTO: 32 PG (ref 27–31)
MCHC RBC AUTO-ENTMCNC: 33.4 G/DL (ref 33–36)
MCV RBC AUTO: 95.7 FL (ref 80–94)
MONOCYTES # BLD AUTO: 0.7 X10(3)/MCL (ref 0.1–1.3)
MONOCYTES NFR BLD AUTO: 7.4 %
NEUTROPHILS # BLD AUTO: 5.89 X10(3)/MCL (ref 2.1–9.2)
NEUTROPHILS NFR BLD AUTO: 61.8 %
NRBC BLD AUTO-RTO: 0 %
PLATELET # BLD AUTO: 263 X10(3)/MCL (ref 130–400)
PMV BLD AUTO: 10.3 FL (ref 7.4–10.4)
POTASSIUM SERPL-SCNC: 4 MMOL/L (ref 3.5–5.1)
PROT SERPL-MCNC: 6.8 GM/DL (ref 6.4–8.3)
RBC # BLD AUTO: 3.91 X10(6)/MCL (ref 4.2–5.4)
SODIUM SERPL-SCNC: 138 MMOL/L (ref 136–145)
WBC # BLD AUTO: 9.52 X10(3)/MCL (ref 4.5–11.5)

## 2024-09-30 PROCEDURE — 85652 RBC SED RATE AUTOMATED: CPT

## 2024-09-30 PROCEDURE — 86140 C-REACTIVE PROTEIN: CPT

## 2024-09-30 PROCEDURE — 86480 TB TEST CELL IMMUN MEASURE: CPT

## 2024-09-30 PROCEDURE — 36415 COLL VENOUS BLD VENIPUNCTURE: CPT

## 2024-09-30 PROCEDURE — 80053 COMPREHEN METABOLIC PANEL: CPT

## 2024-09-30 PROCEDURE — 85025 COMPLETE CBC W/AUTO DIFF WBC: CPT

## 2024-10-02 LAB
GAMMA INTERFERON BACKGROUND BLD IA-ACNC: 0.01 IU/ML
M TB IFN-G BLD-IMP: NEGATIVE
M TB IFN-G CD4+ BCKGRND COR BLD-ACNC: 0.03 IU/ML
M TB IFN-G CD4+CD8+ BCKGRND COR BLD-ACNC: 0.02 IU/ML
MITOGEN IGNF BCKGRD COR BLD-ACNC: 9.99 IU/ML

## 2024-10-21 ENCOUNTER — OFFICE VISIT (OUTPATIENT)
Dept: ORTHOPEDICS | Facility: CLINIC | Age: 34
End: 2024-10-21
Payer: MEDICAID

## 2024-10-21 ENCOUNTER — HOSPITAL ENCOUNTER (OUTPATIENT)
Dept: RADIOLOGY | Facility: HOSPITAL | Age: 34
Discharge: HOME | End: 2024-10-21
Attending: ORTHOPAEDIC SURGERY
Payer: MEDICAID

## 2024-10-21 VITALS
WEIGHT: 186 LBS | HEIGHT: 64 IN | BODY MASS INDEX: 31.76 KG/M2 | SYSTOLIC BLOOD PRESSURE: 116 MMHG | DIASTOLIC BLOOD PRESSURE: 78 MMHG | TEMPERATURE: 98 F

## 2024-10-21 DIAGNOSIS — M25.572 CHRONIC PAIN OF LEFT ANKLE: Primary | ICD-10-CM

## 2024-10-21 DIAGNOSIS — G89.29 CHRONIC PAIN OF LEFT ANKLE: ICD-10-CM

## 2024-10-21 DIAGNOSIS — G89.29 CHRONIC PAIN OF LEFT ANKLE: Primary | ICD-10-CM

## 2024-10-21 DIAGNOSIS — M25.572 CHRONIC PAIN OF LEFT ANKLE: ICD-10-CM

## 2024-10-21 PROCEDURE — 3078F DIAST BP <80 MM HG: CPT | Mod: CPTII,,, | Performed by: ORTHOPAEDIC SURGERY

## 2024-10-21 PROCEDURE — 99213 OFFICE O/P EST LOW 20 MIN: CPT | Mod: S$PBB,25,, | Performed by: ORTHOPAEDIC SURGERY

## 2024-10-21 PROCEDURE — 73610 X-RAY EXAM OF ANKLE: CPT | Mod: TC,LT

## 2024-10-21 PROCEDURE — 99213 OFFICE O/P EST LOW 20 MIN: CPT | Mod: PBBFAC,25

## 2024-10-21 PROCEDURE — 1159F MED LIST DOCD IN RCRD: CPT | Mod: CPTII,,, | Performed by: ORTHOPAEDIC SURGERY

## 2024-10-21 PROCEDURE — 3008F BODY MASS INDEX DOCD: CPT | Mod: CPTII,,, | Performed by: ORTHOPAEDIC SURGERY

## 2024-10-21 PROCEDURE — 20605 DRAIN/INJ JOINT/BURSA W/O US: CPT | Mod: PBBFAC,LT | Performed by: STUDENT IN AN ORGANIZED HEALTH CARE EDUCATION/TRAINING PROGRAM

## 2024-10-21 PROCEDURE — 3074F SYST BP LT 130 MM HG: CPT | Mod: CPTII,,, | Performed by: ORTHOPAEDIC SURGERY

## 2024-10-21 RX ORDER — TRIAMCINOLONE ACETONIDE 40 MG/ML
40 INJECTION, SUSPENSION INTRA-ARTICULAR; INTRAMUSCULAR ONCE
Status: COMPLETED | OUTPATIENT
Start: 2024-10-21 | End: 2024-10-21

## 2024-10-21 RX ORDER — LIDOCAINE HYDROCHLORIDE 10 MG/ML
2 INJECTION, SOLUTION EPIDURAL; INFILTRATION; INTRACAUDAL; PERINEURAL
Status: COMPLETED | OUTPATIENT
Start: 2024-10-21 | End: 2024-10-21

## 2024-10-21 RX ADMIN — LIDOCAINE HYDROCHLORIDE 20 MG: 10 INJECTION, SOLUTION EPIDURAL; INFILTRATION; INTRACAUDAL; PERINEURAL at 09:10

## 2024-10-21 RX ADMIN — TRIAMCINOLONE ACETONIDE 40 MG: 40 INJECTION, SUSPENSION INTRA-ARTICULAR; INTRAMUSCULAR at 09:10

## 2024-10-21 NOTE — PROGRESS NOTES
[facility]  Established Patient Office Visit  10/21/2024       Patient ID: Sharlene Tong  YOB: 1990  MRN: [id number]    Diagnosis:  32-year-old female status post left pilon fracture ORIF on 10/11/2022     Procedure:     Orif, Fracture, Pilon - Left on 10/11/2022      Chief Complaint: Pain of the Left Ankle    Patient is presenting to clinic today for follow-up of the above injury.  She is now 2 years out from her surgery.  She was complaining of a prominence along the medial side of her ankle that is very tender and painful every time she walks.  She walks slowly because of this and finds it difficult to perform all her activities of daily living.  She has not needed ambulatory assistance.  She has done therapy in the past but none recently.  She has not had any other interventions including injections or other procedures.  Pain localizes to the medial side of the ankle as well as the anterior aspect of the ankle.  She denies any numbness or tingling.  All her wounds have healed well without concern for infection.  No interval traumatic events or other issues.      Past Medical History:    Past Medical History:   Diagnosis Date    Arthritis      Past Surgical History:   Procedure Laterality Date    OPEN REDUCTION AND INTERNAL FIXATION (ORIF) OF PILON FRACTURE Left 10/11/2022    Procedure: ORIF, FRACTURE, PILON;  Surgeon: Momo Childs MD;  Location: [facility];  Service: Orthopedics;  Laterality: Left;     Family History   Problem Relation Name Age of Onset    No Known Problems Mother      No Known Problems Father       Social History     Socioeconomic History    Marital status: Single   Tobacco Use    Smoking status: Every Day     Current packs/day: 0.50     Average packs/day: 0.5 packs/day for 11.0 years (5.5 ttl pk-yrs)     Types: Cigarettes    Smokeless tobacco: Never   Substance and Sexual Activity    Alcohol use: No     Comment: occasionally    Drug  use: No    Sexual activity: Yes     Medication List with Changes/Refills   Current Medications    ADALIMUMAB (HUMIRA,CF, PEN) 40 MG/0.4 ML PNKT    Inject 0.4 mLs (40 mg total) into the skin every 14 (fourteen) days.    BACLOFEN (LIORESAL) 10 MG TABLET    Take 1 tablet (10 mg total) by mouth 3 (three) times daily as needed (muscle pain).    DICYCLOMINE (BENTYL) 20 MG TABLET    Take 20 mg by mouth every 6 (six) hours as needed.    ERGOCALCIFEROL (ERGOCALCIFEROL) 50,000 UNIT CAP    TAKE 1 CAPSULE BY MOUTH EVERY 7 DAYS    ETONOGESTREL (NEXPLANON) 68 MG IMPL SUBDERMAL DEVICE    68 mg by Subdermal route once. A single NEXPLANON implant is inserted subdermally just under the skin at the inner side of the non-dominant upper arm.    HYDROXYCHLOROQUINE (PLAQUENIL) 200 MG TABLET    Take 2 tablets (400 mg total) by mouth once daily.    KETOROLAC (TORADOL) 10 MG TABLET    Take 1 tablet (10 mg total) by mouth every 24 hours as needed for Pain.    MEDROXYPROGESTERONE (DEPO-PROVERA) 150 MG/ML INJECTION        XIIDRA 5 % DPET    Place 1 drop into both eyes 2 (two) times daily.     Review of patient's allergies indicates:   Allergen Reactions    Aloe vera Itching    Hydrocodone-acetaminophen Itching       ROS:    Body mass index is 31.93 kg/m².  GENERAL: Well appearing, appropriate for stated age, no acute distress.  CARDIOVASCULAR: Pulses regular by peripheral palpation.  PULMONARY: Respirations are even and non-labored.  NEURO: Awake, alert, and oriented x 3.  PSYCH: Mood & affect are appropriate.  HEENT: Head is normocephalic and atraumatic.    Physical Exam:    Left leg: Patient is neurovascularly intact distally in the left leg.  Her wounds are clean dry and intact no signs of infection.  Fully healed.  She has neutral dorsiflexion with knee extended and 10 with knee flexed.  Prominence of the medial aspect of the anterolateral plate that is tender upon palpation.  No obvious tenderness along the medial plate.  No tenderness  laterally.  Weakness with plantar flexion and dorsiflexion of the ankle.     Imaging:    Relevant imaging results reviewed and interpreted by me, discussed with the patient and / or family today.  X-ray of the left ankle performed today reviewed patient demonstrates a healed pilon fracture with anatomic reduction of the joint surface and appropriate position of hardware.  Mild posttraumatic arthrosis.    Assessment and Plan:  32-year-old female status post left pilon fracture ORIF on 10/11/2022.      -discussed nature of patient's condition at length.  I believe she has symptomatic hardware as well as mild posttraumatic arthritis that is giving her some issues.  We will start with conservative treatment with physical therapy as well as a tibiotalar joint injection today.  Discussed other treatment modalities including oral and topical anti-inflammatories and icing.  -continue using regular shoe  -weight-bearing as tolerated   -home exercises   -follow-up 3 months for re-evaluation, if continues to have suspected symptomatic hardware can consider hardware removal at that point.    .Intermediate Joint Aspiration/Injection: L ankle    Date/Time: 10/21/2024 7:30 AM    Performed by: Donnell Price DO  Authorized by: Donnell Price, DO    Consent Done?:  Yes (Written)  Indications:  Arthritis and pain  Site marked: The procedure site was marked      Location:  Ankle  Site:  L ankle  Ultrasonic Guidance for needle placement: No  Needle size:  25 G  Approach:  Anterolateral  Patient tolerance:  Patient tolerated the procedure well with no immediate complications       Additional Comments: Staff: Geena Ernandez MD    Risks:  Possible complications with the injection include bleeding, infection (.01%), tendon rupture, steroid flare, fat pad or soft tissue atrophy, skin depigmentation, allergic reaction to medications and vasovagal response. (steroid flare treatment is rest, ice, NSAIDs and resolves in 24-36 hours.)    Consent:   No absolute contraindications (cellulitis overlying joint, infection, lack of informed consent, allergy to injection medication, AVN protein or egg allergy for sodium hyaluronate, or history of steroid flare) or relative contraindications (uncontrolled DM2 A1c>10, coagulopathy, INR > 3.5, previous joint replacement or history of AVN).        Description:  The patient was prepped in normal sterile fashion use of chlorhexidine scrub and the appropriate and anatomic landmarks were identified without ultrasound.  Contents of syringe included: 2cc of 1% lidocaine with 40mg of Kenalog    Post Procedure: Patient alert, and moving all extremities. ROM improved, pain decreased.  Good peripheral pulses, no signs of vascular compromise and range of motion intact.  Aftercare instructions were given to patient at time of discharge.  Relative rest for 3 days-avoiding excess activity.  Place ice on the area for 15 minutes every 4-6 hours. Patient may take Tylenol a 1000 mg b.i.d. or ibuprofen 600 mg t.i.d. for the next 3-4 days if not on medication already and safe to take pending co-morbidities.  Protect the area for the next 1-8 hours if anesthetic was used.  Avoid excessive activity for the next 3-4 weeks.  ER precautions given for fever, severe joint pain or allergic reaction or other new symptoms related to the joint injection.                 Randell Ford MD

## 2024-10-26 NOTE — PROGRESS NOTES
Faculty Attestation: Sharlene Lawsonjaleel Tong  was seen at [facility] in the Orthopaedic Clinic. Discussed with the resident at the time of the visit. History of Present Illness, Physical Exam, and Assessment and Plan reviewed. Treatment plan is reasonable and appropriate. Compliance with treatment recommendations is important. Procedure note reviewed. Patient tolerated procedure well.      Anatoly Watkins MD  Orthopaedic Surgery

## 2024-11-18 ENCOUNTER — CLINICAL SUPPORT (OUTPATIENT)
Dept: ORTHOPEDICS | Facility: CLINIC | Age: 34
End: 2024-11-18
Payer: MEDICAID

## 2024-11-18 ENCOUNTER — HOSPITAL ENCOUNTER (OUTPATIENT)
Dept: RADIOLOGY | Facility: HOSPITAL | Age: 34
Discharge: HOME | End: 2024-11-18
Attending: ORTHOPAEDIC SURGERY
Payer: MEDICAID

## 2024-11-18 VITALS
DIASTOLIC BLOOD PRESSURE: 90 MMHG | WEIGHT: 184 LBS | HEART RATE: 97 BPM | TEMPERATURE: 98 F | HEIGHT: 64 IN | BODY MASS INDEX: 31.41 KG/M2 | SYSTOLIC BLOOD PRESSURE: 136 MMHG

## 2024-11-18 DIAGNOSIS — M25.572 ACUTE LEFT ANKLE PAIN: ICD-10-CM

## 2024-11-18 DIAGNOSIS — M79.672 LEFT FOOT PAIN: ICD-10-CM

## 2024-11-18 DIAGNOSIS — S93.602A FOOT SPRAIN, LEFT, INITIAL ENCOUNTER: ICD-10-CM

## 2024-11-18 DIAGNOSIS — M21.42 PES PLANUS OF LEFT FOOT: ICD-10-CM

## 2024-11-18 DIAGNOSIS — M25.572 ACUTE LEFT ANKLE PAIN: Primary | ICD-10-CM

## 2024-11-18 DIAGNOSIS — S82.852S TRIMALLEOLAR FRACTURE OF ANKLE, CLOSED, LEFT, SEQUELA: ICD-10-CM

## 2024-11-18 PROCEDURE — 73610 X-RAY EXAM OF ANKLE: CPT | Mod: TC,LT

## 2024-11-18 PROCEDURE — 73630 X-RAY EXAM OF FOOT: CPT | Mod: TC,LT

## 2024-11-18 PROCEDURE — 99214 OFFICE O/P EST MOD 30 MIN: CPT | Mod: PBBFAC,25

## 2024-11-18 PROCEDURE — 99214 OFFICE O/P EST MOD 30 MIN: CPT | Mod: S$PBB,,, | Performed by: ORTHOPAEDIC SURGERY

## 2024-11-18 RX ORDER — TRAMADOL HYDROCHLORIDE 50 MG/1
50 TABLET ORAL EVERY 6 HOURS PRN
COMMUNITY
Start: 2024-11-14

## 2024-11-18 RX ORDER — IBUPROFEN 800 MG/1
800 TABLET ORAL EVERY 8 HOURS
COMMUNITY
Start: 2024-11-14

## 2024-11-18 NOTE — PROGRESS NOTES
"Subjective:    CC: Pain of the Left Foot (Slip on carpet and fell injuring the left foot 11/14/24.  Taking OTC and RX for pain.  Wearing a post shoe and using crutches )       HPI:  Patient comes in today for her left foot and ankle.  Patient has a history of a previous trimalleolar ankle fracture.  She has been in physical therapy.  More recently 4 days ago, she was wearing some socks when she slipped on the carpet.  She had immediate pain and swelling about her left foot.  She was seen at an outside ER, diagnosed with a sprain, told to be nonweightbearing, placed in a hard-soled shoe.  She continues to have some pain and swelling.    ROS: Refer to HPI for pertinent ROS. All other 12 point systems negative.    Objective:  Vitals:    11/18/24 0920   BP: (!) 136/90   BP Location: Left arm   Patient Position: Sitting   Pulse: 97   Temp: 98.1 °F (36.7 °C)   TempSrc: Oral   Weight: 83.5 kg (184 lb)   Height: 5' 4.25" (1.632 m)        Physical Exam:  Left lower extremity compartment soft and warm.  Skin is intact.  There is no signs symptoms of DVT or infection.  Her previous incisions along the ankle are well healed.  She does have hesitation with motion.  She does have about 25° of ankle motion itself.  She is stable to stressing.  Examination of the left foot she does have a chronic flatfoot deformity.  There is some mild swelling along the dorsal aspect of her forefoot.  She has minimal flexion-extension of her toes sensation intact to light touch brisk capillary refill.  She is nontender about the hindfoot, minimal tenderness about the midfoot.    Images:  X-rays three views left ankle demonstrates healed ankle fracture, hardware in appropriate position.  X-rays three views of the left foot demonstrates pes planus, no obvious fracture or dislocation appreciated. Images Reviewed and discussed with patient.    Assessment:  1. Acute left ankle pain  - X-Ray Ankle Complete Left; Future    2. Left foot pain  - X-Ray Foot " Complete Left; Future    3. Pes planus of left foot    4. Foot sprain, left, initial encounter    5. Trimalleolar fracture of ankle, closed, left, sequela        Plan:  At this time we have discussed her physical exam and x-ray findings.  I believe she can weightbear as tolerated with her hard-soled shoe as tolerated.  We will return to physical therapy, I would like see back 4 weeks repeat exam.    Follow UP: No follow-ups on file.

## 2024-11-19 ENCOUNTER — PATIENT MESSAGE (OUTPATIENT)
Dept: RESEARCH | Facility: HOSPITAL | Age: 34
End: 2024-11-19
Payer: MEDICAID

## 2024-11-19 ENCOUNTER — TELEPHONE (OUTPATIENT)
Dept: ORTHOPEDICS | Facility: CLINIC | Age: 34
End: 2024-11-19
Payer: MEDICAID

## 2024-11-19 NOTE — TELEPHONE ENCOUNTER
Patient called concerned about the radiology report showing that she has a fracture at 4th and 5th metatarsal.    Can you please look at the radiology report an let me know what we need to do next?  Concerned that she was told to apply weight and use crutches and wants to know if she should still do that?    I did tell her that if she asa increase pain with weight bearing to get off the her foot until we call her to let her know what to do next.

## 2024-11-20 NOTE — TELEPHONE ENCOUNTER
Called patient to let her know that she can weight bear in a hard sole shoe.  She understood and will keep her appointment on the 18th

## 2024-12-18 ENCOUNTER — HOSPITAL ENCOUNTER (OUTPATIENT)
Dept: RADIOLOGY | Facility: HOSPITAL | Age: 34
Discharge: HOME | End: 2024-12-18
Attending: ORTHOPAEDIC SURGERY
Payer: MEDICAID

## 2024-12-18 ENCOUNTER — OFFICE VISIT (OUTPATIENT)
Dept: ORTHOPEDICS | Facility: CLINIC | Age: 34
End: 2024-12-18
Payer: MEDICAID

## 2024-12-18 VITALS
BODY MASS INDEX: 31.67 KG/M2 | HEART RATE: 99 BPM | OXYGEN SATURATION: 100 % | SYSTOLIC BLOOD PRESSURE: 108 MMHG | DIASTOLIC BLOOD PRESSURE: 71 MMHG | RESPIRATION RATE: 18 BRPM | TEMPERATURE: 99 F | WEIGHT: 185.5 LBS | HEIGHT: 64 IN

## 2024-12-18 DIAGNOSIS — M79.672 LEFT FOOT PAIN: Primary | ICD-10-CM

## 2024-12-18 DIAGNOSIS — M79.672 LEFT FOOT PAIN: ICD-10-CM

## 2024-12-18 PROCEDURE — 73630 X-RAY EXAM OF FOOT: CPT | Mod: TC,LT

## 2024-12-18 PROCEDURE — 3008F BODY MASS INDEX DOCD: CPT | Mod: CPTII,,, | Performed by: ORTHOPAEDIC SURGERY

## 2024-12-18 PROCEDURE — 1159F MED LIST DOCD IN RCRD: CPT | Mod: CPTII,,, | Performed by: ORTHOPAEDIC SURGERY

## 2024-12-18 PROCEDURE — 99213 OFFICE O/P EST LOW 20 MIN: CPT | Mod: S$PBB,,, | Performed by: ORTHOPAEDIC SURGERY

## 2024-12-18 PROCEDURE — 3074F SYST BP LT 130 MM HG: CPT | Mod: CPTII,,, | Performed by: ORTHOPAEDIC SURGERY

## 2024-12-18 PROCEDURE — 3078F DIAST BP <80 MM HG: CPT | Mod: CPTII,,, | Performed by: ORTHOPAEDIC SURGERY

## 2024-12-18 PROCEDURE — 99214 OFFICE O/P EST MOD 30 MIN: CPT | Mod: PBBFAC,25

## 2024-12-18 NOTE — PROGRESS NOTES
Faculty Attestation: Sharlene Tong  was seen at [facility] in the Orthopaedic Clinic. Patient chart reviewed. History of Present Illness, Physical Exam, and Assessment and Plan reviewed. Treatment plan is reasonable and appropriate. Compliance with treatment recommendations is important. No procedure was performed.     Taiwo Wong MD  Orthopaedic Surgery

## 2024-12-18 NOTE — PROGRESS NOTES
[facility]  Established Patient Office Visit  12/18/2024       Patient ID: Sharlene Tong  YOB: 1990  MRN: [id number]    Diagnosis:  32-year-old female status post left pilon fracture ORIF on 10/11/2022     Procedure:     Orif, Fracture, Pilon - Left on 10/11/2022      Chief Complaint: Injury of the Left Foot (Patient is here for left foot injury. Patient states her left foot has been feeling numb and felt no circulation in foot. )    Patient is presenting to clinic today for follow-up of the above.  She received a left ankle joint injection in October which gave her about 1 week of relief of her symptoms but symptoms have since returned.  She continues to complain of prominence of the anterior tibial plate is very bothersome for.  It is tender directly over the plate.  She also has tenderness and swelling in the forefoot.  She has been in a postop shoe for immobilization uses crutches for ambulatory assistance.  She has been going to therapy and working on range of motion was looking for clarification with regards to her restrictions.     Past Medical History:    Past Medical History:   Diagnosis Date    Arthritis      Past Surgical History:   Procedure Laterality Date    FRACTURE SURGERY  10/11/22    Julio César went well but pain is still there    OPEN REDUCTION AND INTERNAL FIXATION (ORIF) OF PILON FRACTURE Left 10/11/2022    Procedure: ORIF, FRACTURE, PILON;  Surgeon: Momo Childs MD;  Location: [facility];  Service: Orthopedics;  Laterality: Left;     Family History   Problem Relation Name Age of Onset    No Known Problems Mother      No Known Problems Father      Kidney disease Daughter Daeshawnianila Tong         Displastic Right Kidney    Learning disabilities Son Alfonso mackay         ADHD     Social History     Socioeconomic History    Marital status: Single   Tobacco Use    Smoking status: Some Days     Current packs/day: 0.50     Average packs/day:  0.5 packs/day for 11.0 years (5.5 ttl pk-yrs)     Types: Cigarettes    Smokeless tobacco: Never    Tobacco comments:     Trying to quit for now   Substance and Sexual Activity    Alcohol use: No     Comment: occasionally    Drug use: No    Sexual activity: Not Currently     Partners: Male     Birth control/protection: Injection     Comment: Depo Shot     Medication List with Changes/Refills   Current Medications    ADALIMUMAB (HUMIRA,CF, PEN) 40 MG/0.4 ML PNKT    Inject 0.4 mLs (40 mg total) into the skin every 14 (fourteen) days.    BACLOFEN (LIORESAL) 10 MG TABLET    Take 1 tablet (10 mg total) by mouth 3 (three) times daily as needed (muscle pain).    DICYCLOMINE (BENTYL) 20 MG TABLET    Take 20 mg by mouth every 6 (six) hours as needed.    ERGOCALCIFEROL (ERGOCALCIFEROL) 50,000 UNIT CAP    TAKE 1 CAPSULE BY MOUTH EVERY 7 DAYS    ETONOGESTREL (NEXPLANON) 68 MG IMPL SUBDERMAL DEVICE    68 mg by Subdermal route once. A single NEXPLANON implant is inserted subdermally just under the skin at the inner side of the non-dominant upper arm.    HYDROXYCHLOROQUINE (PLAQUENIL) 200 MG TABLET    Take 2 tablets (400 mg total) by mouth once daily.    IBUPROFEN (ADVIL,MOTRIN) 800 MG TABLET    Take 800 mg by mouth every 8 (eight) hours.    KETOROLAC (TORADOL) 10 MG TABLET    Take 1 tablet (10 mg total) by mouth every 24 hours as needed for Pain.    TRAMADOL (ULTRAM) 50 MG TABLET    Take 50 mg by mouth every 6 (six) hours as needed.     Review of patient's allergies indicates:   Allergen Reactions    Sulfa (sulfonamide antibiotics) Hives and Itching    Aloe vera Itching    Hydrocodone-acetaminophen Itching       ROS:    Body mass index is 31.84 kg/m².  GENERAL: Well appearing, appropriate for stated age, no acute distress.  CARDIOVASCULAR: Pulses regular by peripheral palpation.  PULMONARY: Respirations are even and non-labored.  NEURO: Awake, alert, and oriented x 3.  PSYCH: Mood & affect are appropriate.  HEENT: Head is  normocephalic and atraumatic.    Physical Exam:    Left leg: Patient is neurovascularly intact distally in the left leg.  Her wounds are clean dry and intact no signs of infection.  Fully healed.  She has neutral dorsiflexion with knee extended and 10 with knee flexed.  Prominence of the medial aspect of the anterolateral plate that is tender upon palpation.  No obvious tenderness along the medial plate.  No tenderness laterally.  Weakness with plantar flexion and dorsiflexion of the ankle.  Tenderness and fullness over the 3rd 4th and 5th metatarsal heads.    Imaging:    X-ray of the left foot demonstrates nondisplaced fractures of the 4th and 5th metacarpal necks with interval healing    Assessment and Plan:  32-year-old female status post left pilon fracture ORIF on 10/11/2022 now with left 4th and 5th metacarpal neck fractures    -we will treat left 4th and 5th metacarpal neck fractures nonoperatively.  Continue postop shoe as needed and can transition to normal shoe wear with the next 1-2 weeks  -patient has prominence and pain directly over her anterolateral plate.  She has failed nonoperative management including injections, therapy and medications.  We discussed all options with the patient.  She would like to try another round of therapy before considering hardware removal.  Prescription for therapy provided.    -we will have her come back in 8 weeks for re-evaluation and possible discussion of hardware removal    Randell Ford MD

## 2024-12-19 DIAGNOSIS — M25.572 ACUTE LEFT ANKLE PAIN: Primary | ICD-10-CM

## 2024-12-19 RX ORDER — DICLOFENAC SODIUM 10 MG/G
2 GEL TOPICAL 4 TIMES DAILY
Qty: 100 G | Refills: 2 | Status: SHIPPED | OUTPATIENT
Start: 2024-12-19

## 2024-12-19 RX ORDER — DICLOFENAC SODIUM 10 MG/G
2 GEL TOPICAL 4 TIMES DAILY
COMMUNITY
End: 2024-12-19 | Stop reason: SDUPTHER

## 2025-01-27 ENCOUNTER — OFFICE VISIT (OUTPATIENT)
Dept: ORTHOPEDICS | Facility: CLINIC | Age: 35
End: 2025-01-27
Payer: MEDICAID

## 2025-01-27 ENCOUNTER — HOSPITAL ENCOUNTER (OUTPATIENT)
Dept: RADIOLOGY | Facility: HOSPITAL | Age: 35
Discharge: HOME | End: 2025-01-27
Attending: ORTHOPAEDIC SURGERY
Payer: MEDICAID

## 2025-01-27 VITALS
HEIGHT: 64 IN | HEART RATE: 82 BPM | DIASTOLIC BLOOD PRESSURE: 70 MMHG | SYSTOLIC BLOOD PRESSURE: 106 MMHG | BODY MASS INDEX: 32.27 KG/M2 | WEIGHT: 189 LBS | TEMPERATURE: 98 F

## 2025-01-27 DIAGNOSIS — S82.852S TRIMALLEOLAR FRACTURE OF ANKLE, CLOSED, LEFT, SEQUELA: Primary | ICD-10-CM

## 2025-01-27 DIAGNOSIS — S82.852S TRIMALLEOLAR FRACTURE OF ANKLE, CLOSED, LEFT, SEQUELA: ICD-10-CM

## 2025-01-27 PROCEDURE — 3008F BODY MASS INDEX DOCD: CPT | Mod: CPTII,,, | Performed by: ORTHOPAEDIC SURGERY

## 2025-01-27 PROCEDURE — 3074F SYST BP LT 130 MM HG: CPT | Mod: CPTII,,, | Performed by: ORTHOPAEDIC SURGERY

## 2025-01-27 PROCEDURE — 73630 X-RAY EXAM OF FOOT: CPT | Mod: TC,LT

## 2025-01-27 PROCEDURE — 99499 UNLISTED E&M SERVICE: CPT | Mod: S$PBB,,, | Performed by: ORTHOPAEDIC SURGERY

## 2025-01-27 PROCEDURE — 1159F MED LIST DOCD IN RCRD: CPT | Mod: CPTII,,, | Performed by: ORTHOPAEDIC SURGERY

## 2025-01-27 PROCEDURE — 3078F DIAST BP <80 MM HG: CPT | Mod: CPTII,,, | Performed by: ORTHOPAEDIC SURGERY

## 2025-01-27 PROCEDURE — 99213 OFFICE O/P EST LOW 20 MIN: CPT | Mod: PBBFAC,25

## 2025-01-27 RX ORDER — IBUPROFEN 800 MG/1
800 TABLET ORAL EVERY 6 HOURS PRN
Qty: 56 TABLET | Refills: 0 | Status: SHIPPED | OUTPATIENT
Start: 2025-01-27 | End: 2025-02-10

## 2025-01-27 NOTE — PROGRESS NOTES
Faculty Attestation: Sharlene Owen Rakel Tong  was seen at [facility] in the Orthopaedic Clinic. History of Present Illness, Physical Exam, and Assessment and Plan reviewed. Treatment plan is reasonable and appropriate. Compliance with treatment recommendations is important. Discussed with the resident at the time of the visit.  No procedure was performed.     Davy Boyle Jr, MD  Orthopaedic Surgery

## 2025-01-27 NOTE — PROGRESS NOTES
[facility]  Established Patient Office Visit  01/27/2025       Patient ID: Sharlene Tong  YOB: 1990  MRN: [id number]    Diagnosis:  32-year-old female status post left pilon fracture ORIF on 10/11/2022     Procedure:     Orif, Fracture, Pilon - Left on 10/11/2022      Chief Complaint: No chief complaint on file.    Patient is presenting to clinic today for follow-up of the above.  Patient presents to day.  She continues to have pain in the left ankle directly over the plate mostly over the medial aspect of the anterior plate.  She has been doing some therapy and would like to continue trying this.  She is wearing a cast shoe for her 4th and 5th metacarpal fractures pain is improving.    Past Medical History:    Past Medical History:   Diagnosis Date    Arthritis      Past Surgical History:   Procedure Laterality Date    FRACTURE SURGERY  10/11/22    Radhikaery went well but pain is still there    OPEN REDUCTION AND INTERNAL FIXATION (ORIF) OF PILON FRACTURE Left 10/11/2022    Procedure: ORIF, FRACTURE, PILON;  Surgeon: Momo Childs MD;  Location: [facility];  Service: Orthopedics;  Laterality: Left;     Family History   Problem Relation Name Age of Onset    No Known Problems Mother      No Known Problems Father      Kidney disease Daughter Ignacio Tong         Displastic Right Kidney    Learning disabilities Son Alfonso mackay         ADHD     Social History     Socioeconomic History    Marital status: Single   Tobacco Use    Smoking status: Some Days     Current packs/day: 0.50     Average packs/day: 0.5 packs/day for 11.0 years (5.5 ttl pk-yrs)     Types: Cigarettes    Smokeless tobacco: Never    Tobacco comments:     Trying to quit for now   Substance and Sexual Activity    Alcohol use: No     Comment: occasionally    Drug use: No    Sexual activity: Not Currently     Partners: Male     Birth control/protection: Injection     Comment: Depo Shot      Medication List with Changes/Refills   Current Medications    ADALIMUMAB (HUMIRA,CF, PEN) 40 MG/0.4 ML PNKT    Inject 0.4 mLs (40 mg total) into the skin every 14 (fourteen) days.    BACLOFEN (LIORESAL) 10 MG TABLET    Take 1 tablet (10 mg total) by mouth 3 (three) times daily as needed (muscle pain).    DICLOFENAC SODIUM (VOLTAREN) 1 % GEL    Apply 2 g topically 4 (four) times daily.    DICYCLOMINE (BENTYL) 20 MG TABLET    Take 20 mg by mouth every 6 (six) hours as needed.    ERGOCALCIFEROL (ERGOCALCIFEROL) 50,000 UNIT CAP    TAKE 1 CAPSULE BY MOUTH EVERY 7 DAYS    ETONOGESTREL (NEXPLANON) 68 MG IMPL SUBDERMAL DEVICE    68 mg by Subdermal route once. A single NEXPLANON implant is inserted subdermally just under the skin at the inner side of the non-dominant upper arm.    HYDROXYCHLOROQUINE (PLAQUENIL) 200 MG TABLET    Take 2 tablets (400 mg total) by mouth once daily.    IBUPROFEN (ADVIL,MOTRIN) 800 MG TABLET    Take 800 mg by mouth every 8 (eight) hours.    KETOROLAC (TORADOL) 10 MG TABLET    Take 1 tablet (10 mg total) by mouth every 24 hours as needed for Pain.    TRAMADOL (ULTRAM) 50 MG TABLET    Take 50 mg by mouth every 6 (six) hours as needed.     Review of patient's allergies indicates:   Allergen Reactions    Sulfa (sulfonamide antibiotics) Hives and Itching    Aloe vera Itching    Hydrocodone-acetaminophen Itching       ROS:    There is no height or weight on file to calculate BMI.  GENERAL: Well appearing, appropriate for stated age, no acute distress.  CARDIOVASCULAR: Pulses regular by peripheral palpation.  PULMONARY: Respirations are even and non-labored.  NEURO: Awake, alert, and oriented x 3.  PSYCH: Mood & affect are appropriate.  HEENT: Head is normocephalic and atraumatic.    Physical Exam:    Left leg:   Surgical incisions well healed without erythema or drainage   Tenderness to palpation directly over the medial aspect of the anterior plate   No significant tenderness palpation over the  lateral plate or medial plate   Ankle range of motion about 10° past neutral dorsiflexion and almost full plantar flexion   No significant pain with range of motion of the ankle   Motor intact   [place]   Mild tenderness over the 4th and 5th meta tarsal heads    Imaging:    X-ray of the left foot demonstrates nondisplaced fractures of the 4th and 5th metacarpal necks with interval healing    Assessment and Plan:  32-year-old female status post left pilon fracture ORIF on 10/11/2022 now with left 4th and 5th metacarpal neck fractures    -we discussed potential treatment options.  She has tried an injection in the past that has not have provided relief.  She does have tenderness directly over the medial aspect of the anterior plate that is sticking out.  I think this is what is causing her pain.  She would like to have this removed but because she has rheumatoid arthritis she is going to talk to her rheumatologist about timing with her medications.  In addition she would like to continue therapy in the meantime.  We will see her back in 1 month for scheduling of the hardware removal once she has discussed with rheumatologist.  I think we will take out the anterior plate possibly medial plate.  Plan will be to leave the lateral plate alone unless it is causing her problems.    Taiwo George MD

## 2025-02-24 ENCOUNTER — OFFICE VISIT (OUTPATIENT)
Dept: ORTHOPEDICS | Facility: CLINIC | Age: 35
End: 2025-02-24
Payer: MEDICAID

## 2025-02-24 ENCOUNTER — HOSPITAL ENCOUNTER (OUTPATIENT)
Dept: RADIOLOGY | Facility: HOSPITAL | Age: 35
Discharge: HOME | End: 2025-02-24
Attending: STUDENT IN AN ORGANIZED HEALTH CARE EDUCATION/TRAINING PROGRAM
Payer: MEDICAID

## 2025-02-24 VITALS
HEART RATE: 92 BPM | OXYGEN SATURATION: 100 % | WEIGHT: 186.06 LBS | TEMPERATURE: 99 F | DIASTOLIC BLOOD PRESSURE: 83 MMHG | SYSTOLIC BLOOD PRESSURE: 131 MMHG | HEIGHT: 64 IN | BODY MASS INDEX: 31.77 KG/M2 | RESPIRATION RATE: 20 BRPM

## 2025-02-24 DIAGNOSIS — M79.672 LEFT FOOT PAIN: ICD-10-CM

## 2025-02-24 DIAGNOSIS — M79.672 LEFT FOOT PAIN: Primary | ICD-10-CM

## 2025-02-24 PROCEDURE — 73630 X-RAY EXAM OF FOOT: CPT | Mod: TC,LT

## 2025-02-24 PROCEDURE — 99215 OFFICE O/P EST HI 40 MIN: CPT | Mod: PBBFAC,25

## 2025-02-24 PROCEDURE — 3079F DIAST BP 80-89 MM HG: CPT | Mod: CPTII,,, | Performed by: ORTHOPAEDIC SURGERY

## 2025-02-24 PROCEDURE — 1159F MED LIST DOCD IN RCRD: CPT | Mod: CPTII,,, | Performed by: ORTHOPAEDIC SURGERY

## 2025-02-24 PROCEDURE — 3075F SYST BP GE 130 - 139MM HG: CPT | Mod: CPTII,,, | Performed by: ORTHOPAEDIC SURGERY

## 2025-02-24 PROCEDURE — 99214 OFFICE O/P EST MOD 30 MIN: CPT | Mod: S$PBB,,, | Performed by: ORTHOPAEDIC SURGERY

## 2025-02-24 PROCEDURE — 3008F BODY MASS INDEX DOCD: CPT | Mod: CPTII,,, | Performed by: ORTHOPAEDIC SURGERY

## 2025-02-24 RX ORDER — SODIUM CHLORIDE 9 MG/ML
INJECTION, SOLUTION INTRAVENOUS CONTINUOUS
OUTPATIENT
Start: 2025-02-24

## 2025-02-24 RX ORDER — CEFAZOLIN SODIUM 2 G/50ML
2 SOLUTION INTRAVENOUS
OUTPATIENT
Start: 2025-02-24

## 2025-02-24 RX ORDER — MUPIROCIN 20 MG/G
OINTMENT TOPICAL
OUTPATIENT
Start: 2025-02-24

## 2025-02-24 NOTE — PROGRESS NOTES
[facility]  Established Patient Office Visit  02/24/2025       Patient ID: Sharlene Tong  YOB: 1990  MRN: [id number]    Diagnosis:  32-year-old female status post left pilon fracture ORIF on 10/11/2022     Procedure:     Orif, Fracture, Pilon - Left on 10/11/2022      Chief Complaint: Follow-up of the Left Foot (Follow-up left ankle injury, PT 2x week pain 5/10 takes Ibuprofen as needed)    Patient is presenting to clinic today for follow-up of the above.  Patient presents to day.  She continues to have pain in the left ankle directly over the plate mostly over the medial aspect of the anterior plate.  She has been doing some therapy and would like to continue trying this.  She is wearing a cast shoe for her 4th and 5th metatarsal fractures pain is improving.    \interval 02/24/2025   Patient arrives today.  She saw a rheumatologist.  She is still has pain in the ankle.  She is interested in proceeding with hardware removal.    Past Medical History:    Past Medical History:   Diagnosis Date    Arthritis      Past Surgical History:   Procedure Laterality Date    ANKLE FRACTURE SURGERY      FRACTURE SURGERY  10/11/22    Julio César went well but pain is still there    OPEN REDUCTION AND INTERNAL FIXATION (ORIF) OF PILON FRACTURE Left 10/11/2022    Procedure: ORIF, FRACTURE, PILON;  Surgeon: Momo Childs MD;  Location: [facility];  Service: Orthopedics;  Laterality: Left;     Family History   Problem Relation Name Age of Onset    No Known Problems Mother      No Known Problems Father      Kidney disease Daughter Daeshawnianila Tong         Displastic Right Kidney    Learning disabilities Son Alfonso mackay         ADHD     Social History     Socioeconomic History    Marital status: Single   Tobacco Use    Smoking status: Some Days     Current packs/day: 0.50     Average packs/day: 0.5 packs/day for 11.0 years (5.5 ttl pk-yrs)     Types: Cigarettes    Smokeless  tobacco: Never    Tobacco comments:     Trying to quit for now   Substance and Sexual Activity    Alcohol use: No     Comment: occasionally    Drug use: No    Sexual activity: Not Currently     Partners: Male     Birth control/protection: Injection     Comment: Depo Shot     Medication List with Changes/Refills   Current Medications    ADALIMUMAB (HUMIRA,CF, PEN) 40 MG/0.4 ML PNKT    Inject 0.4 mLs (40 mg total) into the skin every 14 (fourteen) days.    BACLOFEN (LIORESAL) 10 MG TABLET    Take 1 tablet (10 mg total) by mouth 3 (three) times daily as needed (muscle pain).    DICLOFENAC SODIUM (VOLTAREN) 1 % GEL    Apply 2 g topically 4 (four) times daily.    DICYCLOMINE (BENTYL) 20 MG TABLET    Take 20 mg by mouth every 6 (six) hours as needed.    ERGOCALCIFEROL (ERGOCALCIFEROL) 50,000 UNIT CAP    TAKE 1 CAPSULE BY MOUTH EVERY 7 DAYS    ETONOGESTREL (NEXPLANON) 68 MG IMPL SUBDERMAL DEVICE    68 mg by Subdermal route once. A single NEXPLANON implant is inserted subdermally just under the skin at the inner side of the non-dominant upper arm.    HYDROXYCHLOROQUINE (PLAQUENIL) 200 MG TABLET    Take 2 tablets (400 mg total) by mouth once daily.    KETOROLAC (TORADOL) 10 MG TABLET    Take 1 tablet (10 mg total) by mouth every 24 hours as needed for Pain.    TRAMADOL (ULTRAM) 50 MG TABLET    Take 50 mg by mouth every 6 (six) hours as needed.     Review of patient's allergies indicates:   Allergen Reactions    Sulfa (sulfonamide antibiotics) Hives and Itching    Aloe vera Itching    Hydrocodone-acetaminophen Itching       ROS:    There is no height or weight on file to calculate BMI.  GENERAL: Well appearing, appropriate for stated age, no acute distress.  CARDIOVASCULAR: Pulses regular by peripheral palpation.  PULMONARY: Respirations are even and non-labored.  NEURO: Awake, alert, and oriented x 3.  PSYCH: Mood & affect are appropriate.  HEENT: Head is normocephalic and atraumatic.    Physical Exam:    Left leg:    Surgical incisions well healed without erythema or drainage   Tenderness to palpation directly over the medial aspect of the anterior plate   No significant tenderness palpation over the lateral plate or medial plate   Ankle range of motion about 10° past neutral dorsiflexion and almost full plantar flexion   No significant pain with range of motion of the ankle   Motor intact   [place]   Mild tenderness over the 4th and 5th meta tarsal heads    Imaging:    X-ray of the left foot demonstrates nondisplaced fractures of the 4th and 5th metatarsal necks with interval healing    Assessment and Plan:  32-year-old female status post left pilon fracture ORIF on 10/11/2022 now with left 4th and 5th metatarsal neck fractures    -nonoperative and operative treatment options discussed.  Risks including but not limited to infection, bleeding, damage to surrounding structures, need for further surgery, continued pain, continued stiffness, hardware breakage, wound problems were discussed.  Patient elected proceed with hardware removal of the left ankle.  We will plan to take out the anterior and medial plates.  We will schedule her for 03/27/2025.    Taiwo George MD

## 2025-02-24 NOTE — PROGRESS NOTES
Faculty Attestation: Sharlene Ellington Ran  was seen at [facility] in the Orthopaedic Clinic. Patient seen and evaluated at the time of the visit. History of Present Illness, Physical Exam, and Assessment and Plan reviewed. Treatment plan is reasonable and appropriate. Compliance with treatment recommendations is important. No procedure was performed.     Taiwo Wong MD  Orthopaedic Surgery

## 2025-03-14 ENCOUNTER — ANESTHESIA EVENT (OUTPATIENT)
Dept: SURGERY | Facility: HOSPITAL | Age: 35
End: 2025-03-14
Payer: MEDICAID

## 2025-03-14 NOTE — ANESTHESIA PREPROCEDURE EVALUATION
Sharlene Tong is a 34 y.o. 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  -  SMOKER, 0.5 PPD    BETA-BLOCKER: NONE    New Orders for Anesthesia: UPT NEG DOS     Problem List[1]    Past Surgical History:   Procedure Laterality Date    ANKLE FRACTURE SURGERY      FRACTURE SURGERY  10/11/22    Sugery went well but pain is still there    OPEN REDUCTION AND INTERNAL FIXATION (ORIF) OF PILON FRACTURE Left 10/11/2022    Procedure: ORIF, FRACTURE, PILON;  Surgeon: Momo Childs MD;  Location: [facility];  Service: Orthopedics;  Laterality: Left;       Lab Results   Component Value Date    WBC 9.90 10/28/2024    HGB 13.1 10/28/2024    HCT 40.0 10/28/2024     10/28/2024       CMP  Sodium   Date Value Ref Range Status   10/28/2024 139 136 - 145 mmol/L Final     Potassium   Date Value Ref Range Status   10/28/2024 4.0 3.5 - 5.1 mmol/L Final     Chloride   Date Value Ref Range Status   10/28/2024 108 95 - 110 mmol/L Final     CO2   Date Value Ref Range Status   10/28/2024 24 23 - 29 mmol/L Final     Glucose   Date Value Ref Range Status   10/28/2024 84 70 - 110 mg/dL Final     BUN   Date Value Ref Range Status   10/28/2024 11 6 - 20 mg/dL Final     Creatinine   Date Value Ref Range Status   10/28/2024 0.8 0.5 - 1.4 mg/dL Final     Calcium   Date Value Ref Range Status   10/28/2024 9.2 8.7 - 10.5 mg/dL Final     Total Protein   Date Value Ref Range Status   10/28/2024 7.2 6.0 - 8.4 g/dL Final     Albumin   Date Value Ref Range Status   10/28/2024 4.0 3.5 - 5.2 g/dL Final     Total  Bilirubin   Date Value Ref Range Status   10/28/2024 0.4 0.1 - 1.0 mg/dL Final     Comment:     For infants and newborns, interpretation of results should be based  on gestational age, weight and in agreement with clinical  observations.    Premature Infant recommended reference ranges:  Up to 24 hours.............<8.0 mg/dL  Up to 48 hours............<12.0 mg/dL  3-5 days..................<15.0 mg/dL  6-29 days.................<15.0 mg/dL       Alkaline Phosphatase   Date Value Ref Range Status   10/28/2024 76 40 - 150 U/L Final     AST   Date Value Ref Range Status   10/28/2024 9 (L) 10 - 40 U/L Final     ALT   Date Value Ref Range Status   10/28/2024 10 10 - 44 U/L Final     Anion Gap   Date Value Ref Range Status   10/28/2024 7 (L) 8 - 16 mmol/L Final     eGFR   Date Value Ref Range Status   10/28/2024 >60.0 >60 mL/min/1.73 m^2 Final           Current Outpatient Medications   Medication Instructions    baclofen (LIORESAL) 10 mg, Oral, 3 times daily PRN    diclofenac sodium (VOLTAREN) 2 g, Topical (Top), 4 times daily    dicyclomine (BENTYL) 20 mg, Every 6 hours PRN    ergocalciferol (ERGOCALCIFEROL) 50,000 Units, Oral, Every 7 days    etonogestreL (NEXPLANON) 68 mg, Once    HUMIRA(CF) PEN 40 mg, Subcutaneous, Every 14 days    hydroxychloroquine (PLAQUENIL) 400 mg, Oral, Daily    ketorolac (TORADOL) 10 mg, Oral, Every 24 hours as needed    traMADoL (ULTRAM) 50 mg, Every 6 hours PRN       Past anesthesia records:       Pre-op Assessment    I have reviewed the Patient Summary Reports.     I have reviewed the Nursing Notes. I have reviewed the NPO Status.   I have reviewed the Medications.     Review of Systems  Anesthesia Hx:  No problems with previous Anesthesia   History of prior surgery of interest to airway management or planning:          Denies Family Hx of Anesthesia complications.    Denies Personal Hx of Anesthesia complications.                    Hematology/Oncology:  Hematology Normal    Oncology Normal                                   EENT/Dental:  EENT/Dental Normal           Cardiovascular:  Cardiovascular Normal                                              Pulmonary:  Pulmonary Normal                       Renal/:  Renal/ Normal                 Hepatic/GI:  Hepatic/GI Normal                    Musculoskeletal:  Musculoskeletal Normal                Neurological:  Neurology Normal                                      Endocrine:  Endocrine Normal            Dermatological:  Skin Normal    Psych:  Psychiatric Normal                  Physical Exam  General: Cooperative, Well nourished, Alert and Oriented    Airway:  Mallampati: II / III/ II  Mouth Opening: Normal  TM Distance: Normal  Tongue: Normal  Neck ROM: Normal ROM    Anesthesia Plan  Type of Anesthesia, risks & benefits discussed:    Anesthesia Type: Gen Supraglottic Airway  Intra-op Monitoring Plan: Standard ASA Monitors  Post Op Pain Control Plan: IV/PO Opioids PRN  Induction:  IV  Airway Plan: Direct  Informed Consent: Informed consent signed with the Patient representative and all parties understand the risks and agree with anesthesia plan.  All questions answered. Patient consented to blood products? No  ASA Score: 2  Day of Surgery Review of History & Physical: H&P Update referred to the surgeon/provider.    Ready For Surgery From Anesthesia Perspective.   .             [1]  Patient Active Problem List  Diagnosis    Decreased ROM of ankle    Decreased strength of lower extremity    Impairment of balance    Gait abnormality    Ankle pain, left    Rheumatoid arthritis involving multiple sites with positive rheumatoid factor    Adalimumab (Humira) long-term use    Immunocompromised    Long-term use of Plaquenil

## 2025-03-20 NOTE — OR NURSING
[facility]  DOS 3/27/2025-Take baclofen and bentyl. Pt states that her prescriber instructed her to hold Humira 2 weeks before and 2 weeks after procedure. Pt states it has been over 2 weeks since her last dose.

## 2025-03-26 ENCOUNTER — PATIENT MESSAGE (OUTPATIENT)
Dept: SURGERY | Facility: HOSPITAL | Age: 35
End: 2025-03-26
Payer: MEDICAID

## 2025-03-26 RX ORDER — OXYCODONE HYDROCHLORIDE 5 MG/1
5 TABLET ORAL EVERY 4 HOURS PRN
Qty: 30 TABLET | Refills: 0 | Status: CANCELLED | OUTPATIENT
Start: 2025-03-26 | End: 2025-04-02

## 2025-03-26 RX ORDER — TRAMADOL HYDROCHLORIDE 50 MG/1
50 TABLET ORAL EVERY 6 HOURS PRN
Qty: 30 TABLET | Refills: 0 | Status: CANCELLED | OUTPATIENT
Start: 2025-03-26

## 2025-03-26 NOTE — H&P
Interval H&P    Patient seen and examined in preop holding area. No changes to history or exam other than noted below.    To OR today for left ankle hardware removal with Dr. Cerna.   -------------------------------------------          [facility]  Established Patient Office Visit  02/24/2025         Patient ID: Sharlene Tong  YOB: 1990  MRN: [id number]     Diagnosis:  32-year-old female status post left pilon fracture ORIF on 10/11/2022      Procedure:      Orif, Fracture, Pilon - Left on 10/11/2022       Chief Complaint: Follow-up of the Left Foot (Follow-up left ankle injury, PT 2x week pain 5/10 takes Ibuprofen as needed)     Patient is presenting to clinic today for follow-up of the above.  Patient presents to day.  She continues to have pain in the left ankle directly over the plate mostly over the medial aspect of the anterior plate.  She has been doing some therapy and would like to continue trying this.  She is wearing a cast shoe for her 4th and 5th metatarsal fractures pain is improving.     \interval 02/24/2025   Patient arrives today.  She saw a rheumatologist.  She is still has pain in the ankle.  She is interested in proceeding with hardware removal.     Past Medical History:          Past Medical History:   Diagnosis Date    Arthritis              Past Surgical History:   Procedure Laterality Date    ANKLE FRACTURE SURGERY        FRACTURE SURGERY   10/11/22     Julio César went well but pain is still there    OPEN REDUCTION AND INTERNAL FIXATION (ORIF) OF PILON FRACTURE Left 10/11/2022     Procedure: ORIF, FRACTURE, PILON;  Surgeon: Momo Childs MD;  Location: [facility];  Service: Orthopedics;  Laterality: Left;             Family History   Problem Relation Name Age of Onset    No Known Problems Mother        No Known Problems Father        Kidney disease Daughter Dakimberlee Tong           Displastic Right Kidney    Learning disabilities Son  Alfonso mackay           ADHD      Social History            Socioeconomic History    Marital status: Single   Tobacco Use    Smoking status: Some Days       Current packs/day: 0.50       Average packs/day: 0.5 packs/day for 11.0 years (5.5 ttl pk-yrs)       Types: Cigarettes    Smokeless tobacco: Never    Tobacco comments:       Trying to quit for now   Substance and Sexual Activity    Alcohol use: No       Comment: occasionally    Drug use: No    Sexual activity: Not Currently       Partners: Male       Birth control/protection: Injection       Comment: Depo Shot           Medication List with Changes/Refills   Current Medications     ADALIMUMAB (HUMIRA,CF, PEN) 40 MG/0.4 ML PNKT    Inject 0.4 mLs (40 mg total) into the skin every 14 (fourteen) days.     BACLOFEN (LIORESAL) 10 MG TABLET    Take 1 tablet (10 mg total) by mouth 3 (three) times daily as needed (muscle pain).     DICLOFENAC SODIUM (VOLTAREN) 1 % GEL    Apply 2 g topically 4 (four) times daily.     DICYCLOMINE (BENTYL) 20 MG TABLET    Take 20 mg by mouth every 6 (six) hours as needed.     ERGOCALCIFEROL (ERGOCALCIFEROL) 50,000 UNIT CAP    TAKE 1 CAPSULE BY MOUTH EVERY 7 DAYS     ETONOGESTREL (NEXPLANON) 68 MG IMPL SUBDERMAL DEVICE    68 mg by Subdermal route once. A single NEXPLANON implant is inserted subdermally just under the skin at the inner side of the non-dominant upper arm.     HYDROXYCHLOROQUINE (PLAQUENIL) 200 MG TABLET    Take 2 tablets (400 mg total) by mouth once daily.     KETOROLAC (TORADOL) 10 MG TABLET    Take 1 tablet (10 mg total) by mouth every 24 hours as needed for Pain.     TRAMADOL (ULTRAM) 50 MG TABLET    Take 50 mg by mouth every 6 (six) hours as needed.           Review of patient's allergies indicates:   Allergen Reactions    Sulfa (sulfonamide antibiotics) Hives and Itching    Aloe vera Itching    Hydrocodone-acetaminophen Itching         ROS:     There is no height or weight on file to calculate BMI.  GENERAL: Well  appearing, appropriate for stated age, no acute distress.  CARDIOVASCULAR: Pulses regular by peripheral palpation.  PULMONARY: Respirations are even and non-labored.  NEURO: Awake, alert, and oriented x 3.  PSYCH: Mood & affect are appropriate.  HEENT: Head is normocephalic and atraumatic.     Physical Exam:     Left leg:   Surgical incisions well healed without erythema or drainage   Tenderness to palpation directly over the medial aspect of the anterior plate   No significant tenderness palpation over the lateral plate or medial plate   Ankle range of motion about 10° past neutral dorsiflexion and almost full plantar flexion   No significant pain with range of motion of the ankle   Motor intact   [place]   Mild tenderness over the 4th and 5th meta tarsal heads     Imaging:     X-ray of the left foot demonstrates nondisplaced fractures of the 4th and 5th metatarsal necks with interval healing     Assessment and Plan:  32-year-old female status post left pilon fracture ORIF on 10/11/2022 now with left 4th and 5th metatarsal neck fractures     -nonoperative and operative treatment options discussed.  Risks including but not limited to infection, bleeding, damage to surrounding structures, need for further surgery, continued pain, continued stiffness, hardware breakage, wound problems were discussed.  Patient elected proceed with hardware removal of the left ankle.  We will plan to take out the anterior and medial plates.  We will schedule her for 03/27/2025.

## 2025-03-27 ENCOUNTER — ANESTHESIA (OUTPATIENT)
Dept: SURGERY | Facility: HOSPITAL | Age: 35
End: 2025-03-27
Payer: MEDICAID

## 2025-03-27 ENCOUNTER — HOSPITAL ENCOUNTER (OUTPATIENT)
Facility: HOSPITAL | Age: 35
Discharge: HOME | End: 2025-03-27
Attending: ORTHOPAEDIC SURGERY | Admitting: ORTHOPAEDIC SURGERY
Payer: MEDICAID

## 2025-03-27 VITALS
BODY MASS INDEX: 32.27 KG/M2 | OXYGEN SATURATION: 100 % | TEMPERATURE: 98 F | HEART RATE: 74 BPM | SYSTOLIC BLOOD PRESSURE: 119 MMHG | RESPIRATION RATE: 19 BRPM | DIASTOLIC BLOOD PRESSURE: 73 MMHG | WEIGHT: 188 LBS

## 2025-03-27 DIAGNOSIS — T84.84XA PAINFUL ORTHOPAEDIC HARDWARE: ICD-10-CM

## 2025-03-27 DIAGNOSIS — G89.29 CHRONIC PAIN OF LEFT ANKLE: Primary | ICD-10-CM

## 2025-03-27 DIAGNOSIS — M25.572 CHRONIC PAIN OF LEFT ANKLE: Primary | ICD-10-CM

## 2025-03-27 DIAGNOSIS — M79.672 LEFT FOOT PAIN: ICD-10-CM

## 2025-03-27 LAB
B-HCG UR QL: NEGATIVE
CTP QC/QA: YES

## 2025-03-27 PROCEDURE — 20680 REMOVAL OF IMPLANT DEEP: CPT | Mod: ,,, | Performed by: ORTHOPAEDIC SURGERY

## 2025-03-27 PROCEDURE — 63600175 PHARM REV CODE 636 W HCPCS: Performed by: ORTHOPAEDIC SURGERY

## 2025-03-27 PROCEDURE — 25000003 PHARM REV CODE 250: Performed by: NURSE ANESTHETIST, CERTIFIED REGISTERED

## 2025-03-27 PROCEDURE — 63600175 PHARM REV CODE 636 W HCPCS: Performed by: STUDENT IN AN ORGANIZED HEALTH CARE EDUCATION/TRAINING PROGRAM

## 2025-03-27 PROCEDURE — 71000033 HC RECOVERY, INTIAL HOUR: Performed by: ORTHOPAEDIC SURGERY

## 2025-03-27 PROCEDURE — 36000706: Performed by: ORTHOPAEDIC SURGERY

## 2025-03-27 PROCEDURE — 71000015 HC POSTOP RECOV 1ST HR: Performed by: ORTHOPAEDIC SURGERY

## 2025-03-27 PROCEDURE — 63600175 PHARM REV CODE 636 W HCPCS: Performed by: NURSE ANESTHETIST, CERTIFIED REGISTERED

## 2025-03-27 PROCEDURE — 36000707: Performed by: ORTHOPAEDIC SURGERY

## 2025-03-27 PROCEDURE — 37000008 HC ANESTHESIA 1ST 15 MINUTES: Performed by: ORTHOPAEDIC SURGERY

## 2025-03-27 PROCEDURE — 37000009 HC ANESTHESIA EA ADD 15 MINS: Performed by: ORTHOPAEDIC SURGERY

## 2025-03-27 PROCEDURE — 81025 URINE PREGNANCY TEST: CPT | Performed by: NURSE PRACTITIONER

## 2025-03-27 PROCEDURE — 71000016 HC POSTOP RECOV ADDL HR: Performed by: ORTHOPAEDIC SURGERY

## 2025-03-27 PROCEDURE — 63600175 PHARM REV CODE 636 W HCPCS: Performed by: SPECIALIST

## 2025-03-27 RX ORDER — CEFAZOLIN SODIUM 1 G/3ML
2 INJECTION, POWDER, FOR SOLUTION INTRAMUSCULAR; INTRAVENOUS
Status: COMPLETED | OUTPATIENT
Start: 2025-03-27 | End: 2025-03-27

## 2025-03-27 RX ORDER — MUPIROCIN 20 MG/G
OINTMENT TOPICAL
Status: DISCONTINUED | OUTPATIENT
Start: 2025-03-27 | End: 2025-03-27 | Stop reason: HOSPADM

## 2025-03-27 RX ORDER — GLUCAGON 1 MG
1 KIT INJECTION
Status: DISCONTINUED | OUTPATIENT
Start: 2025-03-27 | End: 2025-03-27 | Stop reason: HOSPADM

## 2025-03-27 RX ORDER — DEXMEDETOMIDINE HYDROCHLORIDE 100 UG/ML
INJECTION, SOLUTION INTRAVENOUS
Status: DISCONTINUED | OUTPATIENT
Start: 2025-03-27 | End: 2025-03-27

## 2025-03-27 RX ORDER — FENTANYL CITRATE 50 UG/ML
INJECTION, SOLUTION INTRAMUSCULAR; INTRAVENOUS
Status: DISCONTINUED | OUTPATIENT
Start: 2025-03-27 | End: 2025-03-27

## 2025-03-27 RX ORDER — PROCHLORPERAZINE EDISYLATE 5 MG/ML
5 INJECTION INTRAMUSCULAR; INTRAVENOUS ONCE AS NEEDED
Status: DISCONTINUED | OUTPATIENT
Start: 2025-03-27 | End: 2025-03-27 | Stop reason: HOSPADM

## 2025-03-27 RX ORDER — LORAZEPAM 2 MG/ML
0.5 INJECTION INTRAMUSCULAR
Status: DISCONTINUED | OUTPATIENT
Start: 2025-03-27 | End: 2025-03-27 | Stop reason: HOSPADM

## 2025-03-27 RX ORDER — ACETAMINOPHEN 500 MG
500 TABLET ORAL EVERY 6 HOURS PRN
Qty: 30 TABLET | Refills: 0 | Status: SHIPPED | OUTPATIENT
Start: 2025-03-27

## 2025-03-27 RX ORDER — MEPERIDINE HYDROCHLORIDE 25 MG/ML
12.5 INJECTION INTRAMUSCULAR; INTRAVENOUS; SUBCUTANEOUS ONCE
Status: DISCONTINUED | OUTPATIENT
Start: 2025-03-27 | End: 2025-03-27 | Stop reason: HOSPADM

## 2025-03-27 RX ORDER — SODIUM CHLORIDE, SODIUM LACTATE, POTASSIUM CHLORIDE, CALCIUM CHLORIDE 600; 310; 30; 20 MG/100ML; MG/100ML; MG/100ML; MG/100ML
INJECTION, SOLUTION INTRAVENOUS CONTINUOUS
Status: DISCONTINUED | OUTPATIENT
Start: 2025-03-27 | End: 2025-03-27 | Stop reason: HOSPADM

## 2025-03-27 RX ORDER — BUPIVACAINE HYDROCHLORIDE 2.5 MG/ML
INJECTION, SOLUTION EPIDURAL; INFILTRATION; INTRACAUDAL; PERINEURAL
Status: DISCONTINUED | OUTPATIENT
Start: 2025-03-27 | End: 2025-03-27 | Stop reason: HOSPADM

## 2025-03-27 RX ORDER — METHOCARBAMOL 500 MG/1
500 TABLET, FILM COATED ORAL 3 TIMES DAILY
Qty: 30 TABLET | Refills: 0 | Status: SHIPPED | OUTPATIENT
Start: 2025-03-27 | End: 2025-04-06

## 2025-03-27 RX ORDER — PROPOFOL 10 MG/ML
VIAL (ML) INTRAVENOUS
Status: DISCONTINUED | OUTPATIENT
Start: 2025-03-27 | End: 2025-03-27

## 2025-03-27 RX ORDER — ACETAMINOPHEN 10 MG/ML
1000 INJECTION, SOLUTION INTRAVENOUS ONCE
Status: COMPLETED | OUTPATIENT
Start: 2025-03-27 | End: 2025-03-27

## 2025-03-27 RX ORDER — GABAPENTIN 300 MG/1
300 CAPSULE ORAL 3 TIMES DAILY
Qty: 90 CAPSULE | Refills: 0 | Status: SHIPPED | OUTPATIENT
Start: 2025-03-27 | End: 2025-04-26

## 2025-03-27 RX ORDER — LIDOCAINE HYDROCHLORIDE 20 MG/ML
INJECTION, SOLUTION EPIDURAL; INFILTRATION; INTRACAUDAL; PERINEURAL
Status: DISCONTINUED | OUTPATIENT
Start: 2025-03-27 | End: 2025-03-27

## 2025-03-27 RX ORDER — IBUPROFEN 800 MG/1
800 TABLET ORAL EVERY 6 HOURS PRN
Qty: 30 TABLET | Refills: 0 | Status: SHIPPED | OUTPATIENT
Start: 2025-03-27

## 2025-03-27 RX ORDER — HYDROMORPHONE HYDROCHLORIDE 1 MG/ML
0.2 INJECTION, SOLUTION INTRAMUSCULAR; INTRAVENOUS; SUBCUTANEOUS EVERY 5 MIN PRN
Status: DISCONTINUED | OUTPATIENT
Start: 2025-03-27 | End: 2025-03-27 | Stop reason: HOSPADM

## 2025-03-27 RX ORDER — METHOCARBAMOL 100 MG/ML
1000 INJECTION, SOLUTION INTRAMUSCULAR; INTRAVENOUS ONCE
Status: COMPLETED | OUTPATIENT
Start: 2025-03-27 | End: 2025-03-27

## 2025-03-27 RX ORDER — HYDROMORPHONE HYDROCHLORIDE 1 MG/ML
0.5 INJECTION, SOLUTION INTRAMUSCULAR; INTRAVENOUS; SUBCUTANEOUS EVERY 5 MIN PRN
Status: DISCONTINUED | OUTPATIENT
Start: 2025-03-27 | End: 2025-03-27 | Stop reason: HOSPADM

## 2025-03-27 RX ORDER — DIPHENHYDRAMINE HYDROCHLORIDE 50 MG/ML
25 INJECTION, SOLUTION INTRAMUSCULAR; INTRAVENOUS ONCE AS NEEDED
Status: DISCONTINUED | OUTPATIENT
Start: 2025-03-27 | End: 2025-03-27 | Stop reason: HOSPADM

## 2025-03-27 RX ORDER — MIDAZOLAM HYDROCHLORIDE 2 MG/2ML
2 INJECTION, SOLUTION INTRAMUSCULAR; INTRAVENOUS ONCE
Status: COMPLETED | OUTPATIENT
Start: 2025-03-27 | End: 2025-03-27

## 2025-03-27 RX ORDER — SODIUM CHLORIDE 9 MG/ML
INJECTION, SOLUTION INTRAVENOUS CONTINUOUS
Status: DISCONTINUED | OUTPATIENT
Start: 2025-03-27 | End: 2025-03-27 | Stop reason: HOSPADM

## 2025-03-27 RX ORDER — OXYCODONE AND ACETAMINOPHEN 5; 325 MG/1; MG/1
2 TABLET ORAL ONCE
Status: DISCONTINUED | OUTPATIENT
Start: 2025-03-27 | End: 2025-03-27 | Stop reason: HOSPADM

## 2025-03-27 RX ORDER — OXYCODONE HYDROCHLORIDE 5 MG/1
5 TABLET ORAL EVERY 4 HOURS PRN
Qty: 30 TABLET | Refills: 0 | Status: SHIPPED | OUTPATIENT
Start: 2025-03-27 | End: 2025-04-03

## 2025-03-27 RX ORDER — ONDANSETRON HYDROCHLORIDE 2 MG/ML
4 INJECTION, SOLUTION INTRAVENOUS ONCE
Status: DISCONTINUED | OUTPATIENT
Start: 2025-03-27 | End: 2025-03-27 | Stop reason: HOSPADM

## 2025-03-27 RX ORDER — IPRATROPIUM BROMIDE AND ALBUTEROL SULFATE 2.5; .5 MG/3ML; MG/3ML
3 SOLUTION RESPIRATORY (INHALATION) ONCE AS NEEDED
Status: DISCONTINUED | OUTPATIENT
Start: 2025-03-27 | End: 2025-03-27 | Stop reason: HOSPADM

## 2025-03-27 RX ADMIN — ACETAMINOPHEN 1000 MG: 10 INJECTION, SOLUTION INTRAVENOUS at 09:03

## 2025-03-27 RX ADMIN — DEXMEDETOMIDINE 10 MCG: 200 INJECTION, SOLUTION INTRAVENOUS at 08:03

## 2025-03-27 RX ADMIN — PROPOFOL 200 MG: 10 INJECTION, EMULSION INTRAVENOUS at 07:03

## 2025-03-27 RX ADMIN — MIDAZOLAM HYDROCHLORIDE 2 MG: 1 INJECTION, SOLUTION INTRAMUSCULAR; INTRAVENOUS at 06:03

## 2025-03-27 RX ADMIN — HYDROMORPHONE HYDROCHLORIDE 0.5 MG: 1 INJECTION, SOLUTION INTRAMUSCULAR; INTRAVENOUS; SUBCUTANEOUS at 08:03

## 2025-03-27 RX ADMIN — FENTANYL CITRATE 100 MCG: 50 INJECTION, SOLUTION INTRAMUSCULAR; INTRAVENOUS at 07:03

## 2025-03-27 RX ADMIN — CEFAZOLIN 2 G: 330 INJECTION, POWDER, FOR SOLUTION INTRAMUSCULAR; INTRAVENOUS at 07:03

## 2025-03-27 RX ADMIN — LIDOCAINE HYDROCHLORIDE 50 MG: 20 INJECTION, SOLUTION EPIDURAL; INFILTRATION; INTRACAUDAL; PERINEURAL at 07:03

## 2025-03-27 RX ADMIN — METHOCARBAMOL 1000 MG: 100 INJECTION, SOLUTION INTRAMUSCULAR; INTRAVENOUS at 09:03

## 2025-03-27 NOTE — PROGRESS NOTES
Faculty Attestation  Preop Dx: painful hardware left ankle  Plan: HWR L ankle  I agree with the resident's History & Physical.  Proceed with case.    Patient seen and evaluated.  All questions answered, agrees to proceed with surgery.    Andrea Cerna  Orthopaedic Surgery

## 2025-03-27 NOTE — TRANSFER OF CARE
Anesthesia Transfer of Care Note    Patient: Sharlene Tong    Procedure(s) Performed: Procedure(s) (LRB):  REMOVAL, HARDWARE, ANKLE (Left)    Patient location: PACU    Anesthesia Type: general    Transport from OR: Transported from OR on room air with adequate spontaneous ventilation    Post assessment: no apparent anesthetic complications    Post vital signs: stable    Level of consciousness: awake    Nausea/Vomiting: no nausea/vomiting    Complications: none    Transfer of care protocol was followed      Last vitals: Visit Vitals  /77   Pulse 68   Temp 36.2 °C (97.2 °F) (Temporal)   Resp 20   Wt 85.3 kg (188 lb)   LMP 02/10/2025   SpO2 100%   Breastfeeding No   BMI 32.27 kg/m²

## 2025-03-27 NOTE — DISCHARGE SUMMARY
[facility]  Discharge Note  Short Stay    Procedure(s) (LRB):  REMOVAL, HARDWARE, ANKLE (Left)      OUTCOME: Patient tolerated treatment/procedure well without complication and is now ready for discharge.    DISPOSITION: Home or Self Care    FINAL DIAGNOSIS:  <principal problem not specified>    FOLLOWUP: In clinic    DISCHARGE INSTRUCTIONS:  No discharge procedures on file.     TIME SPENT ON DISCHARGE: 5 minutes

## 2025-03-27 NOTE — ANESTHESIA PROCEDURE NOTES
Intubation    Date/Time: 3/27/2025 7:10 AM    Performed by: West Ramos CRNA  Authorized by: Maggie Gutierrez MD    Intubation:     Induction:  Intravenous    Intubated:  Postinduction    Mask Ventilation:  Easy mask    Attempted By:  CRNA    Difficult Airway Encountered?: No      Complications:  None    Airway Device:  Supraglottic airway/LMA    Airway Device Size:  3.0    Placement Verified By:  Capnometry    Complicating Factors:  None    Findings Post-Intubation:  BS equal bilateral

## 2025-03-27 NOTE — DISCHARGE INSTRUCTIONS
Patient has soft dressing and boot.  Keep dressing on and clean/dry until follow up appointment in the ortho clinic.  Patient may bear weight as tolerated.

## 2025-03-27 NOTE — OP NOTE
OPERATIVE REPORT      Patient: Sharlene Tong   : 1990    MRN: [id number]  Date: 2025      Surgeon: Andrea Cerna MD  Assistant: Jimenez Weiner, PGY3  Certified first assist was necessary as a skilled set of hands and was necessary for proper patient positioning as well as assistance with closure in place with multiple implants.  Preoperative Diagnosis:  Left painful orthopedic hardware  Postoperative Diagnosis: Same  Procedure:  Hardware removal left ankle  Anesthesiologist: Maggie Gutierrez MD  OR Staff: Circulator: Kala Jin RN  Relief Circulator: Yasmani Carrillo RN  Relief Scrub: Graciela Crowley ST  Scrub Person: Anisha Ramos ST  Implants:   Implant Name Type Inv. Item Serial No.  Lot No. LRB No. Used Action   screws      Left 15 Explanted   10 hole plate      Left 1 Explanted   9 HOLE PLATE      Left 1 Explanted     EBL: See anesthesia note  Complications: None  Disposition: To PACU, stable    Indications: Sharlene Tong is a 34 y.o. female presenting with the aforementioned injuries/findings. The procedure is indicated to stabilize left ankle.  She had history of ORIF of the P line fracture.  Patient did well.  Had complaints of hardware pain.  Discussed all treatment with the patient.  Risks, benefits alternatives to removal of hardware were discussed in detail.  All questions answered to the patient's satisfaction.  No guarantees made..  The patient is awake and alert. After thorough discussion of the risks, benefits, expected outcomes, and alternatives to surgical intervention, the patient agreed to proceed with surgical treatment.  Specific risks discussed included, but were not limited to: superficial or deep infection, wound healing complications, DVT/PE, significant bleeding requiring transfusion, damage to named anatomic structures in the immediate area including named neurovascular structures, infection, nonunion, malunion and general risks  of anesthesia.  The patient voiced understanding and written as well as verbal consent was obtained by myself prior to the procedure.    Procedure Note:  The patient was brought back to the OR and placed supine on the OR table. After successful induction of anesthesia by anesthesia staff, the patient was positioned in the supine position and all bony prominences were padded appropriately.  The surgical field was then provisionally cleansed and then prepped and draped in the usual sterile fashion.    At this time a time-out was performed, with the correct patient, site, and procedure identified.  The universal time out as well as sign your site protocols were followed.  Preoperative antibiotics were verified as administered.     Previous medial incision was utilized.  It had taken through skin subcutaneous tissue.  Tibialis anterior was identified.  It had taken laterally.  Identified her anterior lateral plate placed with a medial approach.  Removed the 5 distal screws.  Removed 1 proximal shaft screw.  We spoke with the incision medially.  Identified the medial plate.  The 5 screws of the medial plate as well as the medial plate we then removed.  Percutaneous we removed the 3 remaining screws in the shaft using fluoroscopic guidance.  Actively was removed with the anterior lateral plate.  Following this fluoroscopic views AP and lateral showed that hardware was removed aside from the lateral plate which was not causing her issues as well as a broken screw in the proximal shaft and the lag screw as it was buried.  No fractures noted    The incision(s) was/were then irrigated using copious sterile saline. The surgical wound was closed in layered fashion.  The surgical site(s) was/were were sterilely cleansed and dressed.    The patient was then subsequently transferred to to PACU in a stable condition.    All sponge and needle counts were correct at the end of the case.  I was present and participated in all aspects  of the procedure.    Prognosis:  The patient will be kept WBAT on the ipsilateral extremity  .  Patient will receive DVT prophylaxis .       This note/OR report was created with the assistance of  voice recognition software or phone  dictation.  There may be transcription errors as a result of using this technology however minimal. Effort has been made to assure accuracy of transcription but any obvious errors or omissions should be clarified with the author of the document.

## 2025-03-28 NOTE — ANESTHESIA POSTPROCEDURE EVALUATION
Anesthesia Post Evaluation    Patient: Sharlene Tong    Procedure(s) Performed: Procedure(s) (LRB):  REMOVAL, HARDWARE, ANKLE (Left)    Final Anesthesia Type: general      Patient location during evaluation: PACU  Patient participation: Yes- Able to Participate  Level of consciousness: awake and responds to stimulation  Post-procedure vital signs: reviewed and stable  Pain management: adequate  Airway patency: patent    PONV status at discharge: No PONV  Anesthetic complications: no      Cardiovascular status: blood pressure returned to baseline  Respiratory status: unassisted  Hydration status: euvolemic  Follow-up not needed.          Vitals Value Taken Time   /73 03/27/25 10:45   Temp 36.6 °C (97.9 °F) 03/27/25 10:45   Pulse 74 03/27/25 10:45   Resp 19 03/27/25 10:45   SpO2 100 % 03/27/25 10:45         Event Time   Out of Recovery 09:11:17         Pain/Abdoul Score: Pain Rating Prior to Med Admin: 7 (3/27/2025  9:50 AM)  Pain Rating Post Med Admin: 5 (3/27/2025  9:55 AM)  Abdoul Score: 7 (3/27/2025  9:15 AM)  Modified Abdoul Score: 18 (3/27/2025 10:45 AM)

## 2025-04-09 ENCOUNTER — OFFICE VISIT (OUTPATIENT)
Dept: ORTHOPEDICS | Facility: CLINIC | Age: 35
End: 2025-04-09
Payer: MEDICAID

## 2025-04-09 ENCOUNTER — HOSPITAL ENCOUNTER (OUTPATIENT)
Dept: RADIOLOGY | Facility: HOSPITAL | Age: 35
Discharge: HOME | End: 2025-04-09
Attending: STUDENT IN AN ORGANIZED HEALTH CARE EDUCATION/TRAINING PROGRAM
Payer: MEDICAID

## 2025-04-09 VITALS
WEIGHT: 188 LBS | SYSTOLIC BLOOD PRESSURE: 115 MMHG | BODY MASS INDEX: 32.1 KG/M2 | DIASTOLIC BLOOD PRESSURE: 77 MMHG | HEART RATE: 86 BPM | HEIGHT: 64 IN | TEMPERATURE: 98 F

## 2025-04-09 DIAGNOSIS — M25.572 LEFT ANKLE PAIN, UNSPECIFIED CHRONICITY: Primary | ICD-10-CM

## 2025-04-09 DIAGNOSIS — G89.29 CHRONIC PAIN OF LEFT ANKLE: ICD-10-CM

## 2025-04-09 DIAGNOSIS — M25.572 LEFT ANKLE PAIN, UNSPECIFIED CHRONICITY: ICD-10-CM

## 2025-04-09 DIAGNOSIS — M25.572 CHRONIC PAIN OF LEFT ANKLE: ICD-10-CM

## 2025-04-09 PROCEDURE — 3078F DIAST BP <80 MM HG: CPT | Mod: CPTII,,, | Performed by: SPECIALIST

## 2025-04-09 PROCEDURE — 73610 X-RAY EXAM OF ANKLE: CPT | Mod: TC,LT

## 2025-04-09 PROCEDURE — 1159F MED LIST DOCD IN RCRD: CPT | Mod: CPTII,,, | Performed by: SPECIALIST

## 2025-04-09 PROCEDURE — 3074F SYST BP LT 130 MM HG: CPT | Mod: CPTII,,, | Performed by: SPECIALIST

## 2025-04-09 PROCEDURE — 99213 OFFICE O/P EST LOW 20 MIN: CPT | Mod: PBBFAC,25

## 2025-04-09 PROCEDURE — 99024 POSTOP FOLLOW-UP VISIT: CPT | Mod: ,,, | Performed by: SPECIALIST

## 2025-04-09 NOTE — PROGRESS NOTES
Past Medical History:   Diagnosis Date    Arthritis        Past Surgical History:   Procedure Laterality Date    ANKLE FRACTURE SURGERY      ANKLE HARDWARE REMOVAL Left 03/27/2025    Procedure: REMOVAL, HARDWARE, ANKLE;  Surgeon: Andrea Cerna MD;  Location: [facility];  Service: Orthopedics;  Laterality: Left;    FRACTURE SURGERY  10/11/22    Julio César went well but pain is still there    OPEN REDUCTION AND INTERNAL FIXATION (ORIF) OF PILON FRACTURE Left 10/11/2022    Procedure: ORIF, FRACTURE, PILON;  Surgeon: Momo Childs MD;  Location: [facility];  Service: Orthopedics;  Laterality: Left;       Current Outpatient Medications   Medication Sig    acetaminophen (TYLENOL) 500 MG tablet Take 1 tablet (500 mg total) by mouth every 6 (six) hours as needed for Pain.    adalimumab (HUMIRA,CF, PEN) 40 mg/0.4 mL PnKt Inject 0.4 mLs (40 mg total) into the skin every 14 (fourteen) days.    diclofenac sodium (VOLTAREN) 1 % Gel Apply 2 g topically 4 (four) times daily.    dicyclomine (BENTYL) 20 mg tablet Take 20 mg by mouth every 6 (six) hours as needed.    ergocalciferol (ERGOCALCIFEROL) 50,000 unit Cap TAKE 1 CAPSULE BY MOUTH EVERY 7 DAYS    etonogestreL (NEXPLANON) 68 mg Impl subdermal device 68 mg by Subdermal route once. A single NEXPLANON implant is inserted subdermally just under the skin at the inner side of the non-dominant upper arm.    gabapentin (NEURONTIN) 300 MG capsule Take 1 capsule (300 mg total) by mouth 3 (three) times daily.    hydroxychloroquine (PLAQUENIL) 200 mg tablet Take 2 tablets (400 mg total) by mouth once daily.    ibuprofen (ADVIL,MOTRIN) 800 MG tablet Take 1 tablet (800 mg total) by mouth every 6 (six) hours as needed for Pain.    baclofen (LIORESAL) 10 MG tablet Take 1 tablet (10 mg total) by mouth 3 (three) times daily as needed (muscle pain).     No current facility-administered medications for this visit.     Facility-Administered Medications Ordered in Other Visits   Medication    LIDOcaine  "(PF) 10 mg/ml (1%) injection 10 mg       Review of patient's allergies indicates:   Allergen Reactions    Sulfa (sulfonamide antibiotics) Hives and Itching    Aloe vera Itching    Hydrocodone-acetaminophen Itching       Family History   Problem Relation Name Age of Onset    No Known Problems Mother      No Known Problems Father      Kidney disease Daughter Ignacio Brown         Displastic Right Kidney    Learning disabilities Son Alfonso mackay         ADHD       Social History[1]    Chief Complaint:   Chief Complaint   Patient presents with    Left Ankle - Pain     Patient presents for left ankle. States pain level 7/10 right now. States pain burning/sharp/spasms.        Consulting Physician: No ref. provider found    History of present illness:    This is a 34 y.o. year old female who complains of pain over the anterior aspect of the left ankle.  Her pain has improved since surgery.  She had hardware removal of an anterior plate and screws from her distal tibia 2 weeks ago.  She is here for suture removal today.    Review of Systems:    Constitution:   Denies chills, fever, and sweats.  HENT:   Denies headaches or blurry vision.  Cardiovascular:  Denies chest pain or irregular heart beat.  Respiratory:   Denies cough or shortness of breath.  Gastrointestinal:  Denies abdominal pain, nausea, or vomiting.  Musculoskeletal:   Denies muscle cramps.  Neurological:   Denies dizziness or focal weakness.  Psychiatric/Behavior: Normal mental status.  Hematology/Lymph:  Denies bleeding problem or easy bruising/bleeding.  Skin:    Denies rash or suspicious lesions.    Examination:    Vital Signs:    Vitals:    04/09/25 1426   BP: 115/77   Pulse: 86   Temp: 97.6 °F (36.4 °C)   Weight: 85.3 kg (188 lb)   Height: 5' 4" (1.626 m)   PainSc:   7       Body mass index is 32.27 kg/m².    Constitution:   Well-developed, well nourished patient in no acute distress.  Neurological:   Alert and oriented x 3 and cooperative to " examination.     Psychiatric/Behavior: Normal mental status.  Respiratory:   No shortness of breath.non labored breathing.  Cardiovascular: Regular rate and rhythm  Eyes:    Extraoccular muscles intact  Skin:    No scars, rash or suspicious lesions.    Physical Exam:  Left ankle exam:   Healed anterior extensile incision   No redness, no swelling, no increased heat   No evidence of infection   2+ pulses with intact sensory and motor function   Active dorsiflexion to neutral and plantar flexion of 20°   No tenderness of the hindfoot, midfoot, or forefoot   Full range motion of all 5 toes actively and passively    Imaging: X-rays ordered and images interpreted today personally by me of three views of the left ankle which show interval hardware removal of the anterior plate and screws with 1 lag screw in 1 screw tip remaining in the tibia shaft and the plate and screws remaining in the fibula as previously planned.  No acute fracture or dislocation.  Ankle mortise looks excellent.  Alignment looks excellent.  Impression: As above.         Assessment: Left ankle pain, unspecified chronicity  -     X-Ray Ankle Complete Left; Future; Expected date: 04/09/2025    Chronic pain of left ankle        Plan:  Sutures removed in the clinic today   Steri-Strips applied   Okay to shower   Follow up in 1 month for repeat clinical exam and advancement to regular shoe wear   Ankle boot for 1 more month with full weight-bearing  Physical therapy prescribed      DISCLAIMER: This note may have been dictated using voice recognition software and may contain grammatical errors.     NOTE: Consult report sent to referring provider via [employer] EMR.       [1]   Social History  Socioeconomic History    Marital status: Single   Tobacco Use    Smoking status: Some Days     Current packs/day: 0.50     Average packs/day: 0.5 packs/day for 11.0 years (5.5 ttl pk-yrs)     Types: Cigarettes    Smokeless tobacco: Never    Tobacco comments:     Trying to  quit for now   Substance and Sexual Activity    Alcohol use: No     Comment: occasionally    Drug use: No    Sexual activity: Not Currently     Partners: Male     Birth control/protection: Injection     Comment: Depo Shot

## 2025-04-15 ENCOUNTER — LAB VISIT (OUTPATIENT)
Dept: LAB | Facility: HOSPITAL | Age: 35
End: 2025-04-15
Attending: INTERNAL MEDICINE
Payer: MEDICAID

## 2025-04-15 DIAGNOSIS — Z79.620 ADALIMUMAB (HUMIRA) LONG-TERM USE: ICD-10-CM

## 2025-04-15 DIAGNOSIS — M05.79 RHEUMATOID ARTHRITIS INVOLVING MULTIPLE SITES WITH POSITIVE RHEUMATOID FACTOR: ICD-10-CM

## 2025-04-15 LAB
ALBUMIN SERPL-MCNC: 4 G/DL (ref 3.5–5)
ALBUMIN/GLOB SERPL: 1.1 RATIO (ref 1.1–2)
ALP SERPL-CCNC: 69 UNIT/L (ref 40–150)
ALT SERPL-CCNC: 12 UNIT/L (ref 0–55)
ANION GAP SERPL CALC-SCNC: 8 MEQ/L
AST SERPL-CCNC: 11 UNIT/L (ref 11–45)
BASOPHILS # BLD AUTO: 0.03 X10(3)/MCL
BASOPHILS NFR BLD AUTO: 0.4 %
BILIRUB SERPL-MCNC: 0.4 MG/DL
BUN SERPL-MCNC: 10.3 MG/DL (ref 7–18.7)
CALCIUM SERPL-MCNC: 9.1 MG/DL (ref 8.4–10.2)
CHLORIDE SERPL-SCNC: 107 MMOL/L (ref 98–107)
CO2 SERPL-SCNC: 23 MMOL/L (ref 22–29)
CREAT SERPL-MCNC: 0.73 MG/DL (ref 0.55–1.02)
CREAT/UREA NIT SERPL: 14
CRP SERPL-MCNC: 1.2 MG/L
EOSINOPHIL # BLD AUTO: 0.11 X10(3)/MCL (ref 0–0.9)
EOSINOPHIL NFR BLD AUTO: 1.4 %
ERYTHROCYTE [DISTWIDTH] IN BLOOD BY AUTOMATED COUNT: 11.8 % (ref 11.5–17)
ERYTHROCYTE [SEDIMENTATION RATE] IN BLOOD: 11 MM/HR (ref 0–20)
GFR SERPLBLD CREATININE-BSD FMLA CKD-EPI: >60 ML/MIN/1.73/M2
GLOBULIN SER-MCNC: 3.5 GM/DL (ref 2.4–3.5)
GLUCOSE SERPL-MCNC: 81 MG/DL (ref 74–100)
HCT VFR BLD AUTO: 39.1 % (ref 37–47)
HGB BLD-MCNC: 12.8 G/DL (ref 12–16)
IMM GRANULOCYTES # BLD AUTO: 0.03 X10(3)/MCL (ref 0–0.04)
IMM GRANULOCYTES NFR BLD AUTO: 0.4 %
LYMPHOCYTES # BLD AUTO: 1.67 X10(3)/MCL (ref 0.6–4.6)
LYMPHOCYTES NFR BLD AUTO: 21.1 %
MCH RBC QN AUTO: 32.1 PG (ref 27–31)
MCHC RBC AUTO-ENTMCNC: 32.7 G/DL (ref 33–36)
MCV RBC AUTO: 98 FL (ref 80–94)
MONOCYTES # BLD AUTO: 0.8 X10(3)/MCL (ref 0.1–1.3)
MONOCYTES NFR BLD AUTO: 10.1 %
NEUTROPHILS # BLD AUTO: 5.26 X10(3)/MCL (ref 2.1–9.2)
NEUTROPHILS NFR BLD AUTO: 66.6 %
NRBC BLD AUTO-RTO: 0 %
PLATELET # BLD AUTO: 244 X10(3)/MCL (ref 130–400)
PMV BLD AUTO: 10.1 FL (ref 7.4–10.4)
POTASSIUM SERPL-SCNC: 4 MMOL/L (ref 3.5–5.1)
PROT SERPL-MCNC: 7.5 GM/DL (ref 6.4–8.3)
RBC # BLD AUTO: 3.99 X10(6)/MCL (ref 4.2–5.4)
SODIUM SERPL-SCNC: 138 MMOL/L (ref 136–145)
WBC # BLD AUTO: 7.9 X10(3)/MCL (ref 4.5–11.5)

## 2025-04-15 PROCEDURE — 85025 COMPLETE CBC W/AUTO DIFF WBC: CPT

## 2025-04-15 PROCEDURE — 80053 COMPREHEN METABOLIC PANEL: CPT

## 2025-04-15 PROCEDURE — 85652 RBC SED RATE AUTOMATED: CPT

## 2025-04-15 PROCEDURE — 86140 C-REACTIVE PROTEIN: CPT

## 2025-04-15 PROCEDURE — 36415 COLL VENOUS BLD VENIPUNCTURE: CPT

## 2025-04-21 PROBLEM — Z79.899 LONG-TERM USE OF PLAQUENIL: Status: RESOLVED | Noted: 2024-05-27 | Resolved: 2025-04-21

## 2025-04-28 ENCOUNTER — TELEPHONE (OUTPATIENT)
Dept: ORTHOPEDICS | Facility: CLINIC | Age: 35
End: 2025-04-28
Payer: MEDICAID

## 2025-04-28 NOTE — TELEPHONE ENCOUNTER
Patient  called to let us know to fax the PT order to [facility] [phone] and to please call her when it is done

## 2025-04-29 NOTE — TELEPHONE ENCOUNTER
The PT order has been faxed and patient was called but a message was left on voicemail by Dinorah on 04/28/25. I just tried the patient again this morning. The primary number [phone] just rang and rang then said it could not connect the call at this time. The second number had a voicemail set up so I left a message letting them know the PT order has been faxed and if they have any questions to contact the office.

## 2025-05-12 DIAGNOSIS — R87.810 ASCUS WITH POSITIVE HIGH RISK HPV CERVICAL: Primary | ICD-10-CM

## 2025-05-12 DIAGNOSIS — R87.610 ASCUS WITH POSITIVE HIGH RISK HPV CERVICAL: Primary | ICD-10-CM

## 2025-06-09 ENCOUNTER — HOSPITAL ENCOUNTER (OUTPATIENT)
Dept: RADIOLOGY | Facility: HOSPITAL | Age: 35
Discharge: HOME | End: 2025-06-09
Attending: SPECIALIST
Payer: MEDICAID

## 2025-06-09 ENCOUNTER — OFFICE VISIT (OUTPATIENT)
Dept: ORTHOPEDICS | Facility: CLINIC | Age: 35
End: 2025-06-09
Payer: MEDICAID

## 2025-06-09 VITALS
SYSTOLIC BLOOD PRESSURE: 108 MMHG | HEIGHT: 64 IN | RESPIRATION RATE: 19 BRPM | BODY MASS INDEX: 32.37 KG/M2 | HEART RATE: 95 BPM | DIASTOLIC BLOOD PRESSURE: 72 MMHG | OXYGEN SATURATION: 99 % | WEIGHT: 189.63 LBS | TEMPERATURE: 99 F

## 2025-06-09 DIAGNOSIS — M25.572 LEFT ANKLE PAIN, UNSPECIFIED CHRONICITY: Primary | ICD-10-CM

## 2025-06-09 DIAGNOSIS — M25.572 LEFT ANKLE PAIN, UNSPECIFIED CHRONICITY: ICD-10-CM

## 2025-06-09 PROCEDURE — 3078F DIAST BP <80 MM HG: CPT | Mod: CPTII,,, | Performed by: SPECIALIST

## 2025-06-09 PROCEDURE — 3074F SYST BP LT 130 MM HG: CPT | Mod: CPTII,,, | Performed by: SPECIALIST

## 2025-06-09 PROCEDURE — 1159F MED LIST DOCD IN RCRD: CPT | Mod: CPTII,,, | Performed by: SPECIALIST

## 2025-06-09 PROCEDURE — 73610 X-RAY EXAM OF ANKLE: CPT | Mod: TC,LT

## 2025-06-09 PROCEDURE — 99024 POSTOP FOLLOW-UP VISIT: CPT | Mod: ,,, | Performed by: SPECIALIST

## 2025-06-09 PROCEDURE — 99214 OFFICE O/P EST MOD 30 MIN: CPT | Mod: PBBFAC,25

## 2025-06-09 NOTE — PROGRESS NOTES
Past Medical History:   Diagnosis Date    Arthritis        Past Surgical History:   Procedure Laterality Date    ANKLE FRACTURE SURGERY      ANKLE HARDWARE REMOVAL Left 03/27/2025    Procedure: REMOVAL, HARDWARE, ANKLE;  Surgeon: Andrea Cerna MD;  Location: [facility];  Service: Orthopedics;  Laterality: Left;    FRACTURE SURGERY  10/11/22    Julio César went well but pain is still there    OPEN REDUCTION AND INTERNAL FIXATION (ORIF) OF PILON FRACTURE Left 10/11/2022    Procedure: ORIF, FRACTURE, PILON;  Surgeon: Momo Childs MD;  Location: [facility];  Service: Orthopedics;  Laterality: Left;       Current Outpatient Medications   Medication Sig    acetaminophen (TYLENOL) 500 MG tablet Take 1 tablet (500 mg total) by mouth every 6 (six) hours as needed for Pain.    adalimumab (HUMIRA,CF, PEN) 40 mg/0.4 mL PnKt Inject 0.4 mLs (40 mg total) into the skin every 14 (fourteen) days.    baclofen (LIORESAL) 10 MG tablet Take 1 tablet (10 mg total) by mouth 3 (three) times daily as needed (muscle pain).    diclofenac sodium (VOLTAREN) 1 % Gel Apply 2 g topically 4 (four) times daily.    dicyclomine (BENTYL) 20 mg tablet Take 20 mg by mouth every 6 (six) hours as needed.    ergocalciferol (ERGOCALCIFEROL) 50,000 unit Cap Take 1 capsule (50,000 Units total) by mouth every 7 days.    etonogestreL (NEXPLANON) 68 mg Impl subdermal device 68 mg by Subdermal route once. A single NEXPLANON implant is inserted subdermally just under the skin at the inner side of the non-dominant upper arm.    ibuprofen (ADVIL,MOTRIN) 800 MG tablet Take 1 tablet (800 mg total) by mouth every 6 (six) hours as needed for Pain.    gabapentin (NEURONTIN) 300 MG capsule Take 1 capsule (300 mg total) by mouth 3 (three) times daily.     No current facility-administered medications for this visit.       Review of patient's allergies indicates:   Allergen Reactions    Sulfa (sulfonamide antibiotics) Hives and Itching    Aloe vera Itching     "Hydrocodone-acetaminophen Itching       Family History   Problem Relation Name Age of Onset    No Known Problems Mother      No Known Problems Father      Kidney disease Daughter Daeshawniece Brown         Displastic Right Kidney    Learning disabilities Son Alfonso mackay         ADHD       Social History[1]    Chief Complaint:   Chief Complaint   Patient presents with    Left Ankle - Follow-up       Consulting Physician: No ref. provider found    History of present illness:    This is a 35 y.o. year old female who complains of pain over the anterior aspect of the left tibia.  She underwent removal of the left ankle tibial plate and screws due to prominent painful hardware overlying the plate on March 27, 2025.  She still has some mild anterior tibial crest pain.  No pain medially.  No pain laterally.  No numbness or tingling.  Ambulating with a walker boot   States she fell 3 weeks ago.    Review of Systems:    Constitution:   Denies chills, fever, and sweats.  HENT:   Denies headaches or blurry vision.  Cardiovascular:  Denies chest pain or irregular heart beat.  Respiratory:   Denies cough or shortness of breath.  Gastrointestinal:  Denies abdominal pain, nausea, or vomiting.  Musculoskeletal:   Denies muscle cramps.  Neurological:   Denies dizziness or focal weakness.  Psychiatric/Behavior: Normal mental status.  Hematology/Lymph:  Denies bleeding problem or easy bruising/bleeding.  Skin:    Denies rash or suspicious lesions.    Examination:    Vital Signs:    Vitals:    06/09/25 1015   BP: 108/72   Pulse: 95   Resp: 19   Temp: 98.8 °F (37.1 °C)   TempSrc: Oral   SpO2: 99%   Weight: 86 kg (189 lb 9.5 oz)   Height: 5' 4" (1.626 m)   PainSc:   6   PainLoc: Ankle       Body mass index is 32.54 kg/m².    Constitution:   Well-developed, well nourished patient in no acute distress.  Neurological:   Alert and oriented x 3 and cooperative to examination.     Psychiatric/Behavior: Normal mental status.  Respiratory: "   No shortness of breath.non labored breathing.  Cardiovascular: Regular rate and rhythm  Eyes:    Extraoccular muscles intact  Skin:    No scars, rash or suspicious lesions.    Physical Exam:  Patient is currently ambulating in a walker boot   No swelling, no redness, no increased heat   Healed surgical scars   No evidence of infection   Active dorsiflexion of 5° dorsiflexion past neutral   25° plantar flexion   Antalgic gait   No crepitus   2+ pulses with intact sensory and motor function    Imaging: X-rays ordered and images interpreted today personally by me of three views of the left ankle which show a healed left ankle old fracture with some retained hardware with interval removal of the majority of the screws from the tibia and the plate.  No acute fracture or dislocation.  Ankle mortise looks excellent with no evidence of posttraumatic osteoarthrosis.  Impression: As above.         Assessment: Left ankle pain, unspecified chronicity  -     X-Ray Ankle Complete Left; Future; Expected date: 06/09/2025        Plan:  ASO   Weightbearing as tolerated   Physical therapy for gait training and balance as well as range motion and strengthening   Follow up for checkup in 3 months  No radiographs at next follow up.      DISCLAIMER: This note may have been dictated using voice recognition software and may contain grammatical errors.     NOTE: Consult report sent to referring provider via [employer].         [1]   Social History  Socioeconomic History    Marital status: Single   Tobacco Use    Smoking status: Some Days     Current packs/day: 0.50     Average packs/day: 0.5 packs/day for 11.0 years (5.5 ttl pk-yrs)     Types: Cigarettes    Smokeless tobacco: Never    Tobacco comments:     Trying to quit for now   Substance and Sexual Activity    Alcohol use: No     Comment: occasionally    Drug use: No    Sexual activity: Not Currently     Partners: Male     Birth control/protection: Injection     Comment: Depo Shot

## 2025-08-08 ENCOUNTER — PROCEDURE VISIT (OUTPATIENT)
Dept: GYNECOLOGY | Facility: CLINIC | Age: 35
End: 2025-08-08
Payer: MEDICAID

## 2025-08-08 ENCOUNTER — TELEPHONE (OUTPATIENT)
Dept: GYNECOLOGY | Facility: CLINIC | Age: 35
End: 2025-08-08

## 2025-08-08 ENCOUNTER — TELEPHONE (OUTPATIENT)
Dept: ORTHOPEDICS | Facility: CLINIC | Age: 35
End: 2025-08-08
Payer: MEDICAID

## 2025-08-08 VITALS
OXYGEN SATURATION: 100 % | RESPIRATION RATE: 20 BRPM | WEIGHT: 189 LBS | TEMPERATURE: 98 F | BODY MASS INDEX: 32.27 KG/M2 | DIASTOLIC BLOOD PRESSURE: 68 MMHG | SYSTOLIC BLOOD PRESSURE: 101 MMHG | HEIGHT: 64 IN | HEART RATE: 83 BPM

## 2025-08-08 DIAGNOSIS — R87.810 ASCUS WITH POSITIVE HIGH RISK HPV CERVICAL: ICD-10-CM

## 2025-08-08 DIAGNOSIS — R87.610 ASCUS WITH POSITIVE HIGH RISK HPV CERVICAL: ICD-10-CM

## 2025-08-08 LAB
B-HCG UR QL: NEGATIVE
CTP QC/QA: YES

## 2025-08-08 NOTE — TELEPHONE ENCOUNTER
Good afternoon,     Patient called asking if she is able to take her Humara injection today even though she had a colposcopy done today. She is due for her next dose today. She forgot to ask the doctor at her apt. Informed patient that I would need to send a message to the doctors to see and we would give her a call back. Patient verbalized understanding.     Please advise. Thank you.

## 2025-08-08 NOTE — PROCEDURES
Colposcopy    Date/Time: 2025 8:45 AM    Performed by: Momo Pate MD  Authorized by: Momo Pate MD    Consent obatined:  Prior to procedure the appropriate consent was completed and verified  Timeout:Immediately prior to procedure a time out was called to verify the correct patient, procedure, equipment, support staff and site/side marked as required  Assistants?: No      Colposcopy Site:  Cervix  Position:  Supine  Acrowhite Lesion: No    Atypical Vessels: No    Transformation Zone Adequate?: Yes (No lesions)    Biopsy?: No    ECC Performed?: Yes    LEEP Performed?: No    Estimated blood loss (cc):  2   Patient tolerated the procedure well with no immediate complications.   Post-operative instructions were provided for the patient.   Patient was discharged and will follow up if any complications occur                                  [facility] COLPOSCOPY CLINIC NOTE     Sharlene Tong is a 35 y.o.  referred to [facility] colposcopy clinic from [facility].      Patient reports Yes, current history of abnormal pap smear.        Pap History:   ASCUS, HPV HR+ (2025)  ASCUS, HPV HR + ()      Sexual History:    Number of sex partners: Current 1; Lifetime 5  Contraception:  PLAN CONTRACEPTION: IUD, Depo-shot (previously)  Future fertility desires: YES/NO: Yes  Smoking History: 2 pack-year history  HIV status: negative  HPV vaccination status: YES/NO: Yes    GYN History:  Last menstrual period:  2025        Gynecology  OB History          3    Para   3    Term           3    AB        Living   3         SAB        IAB        Ectopic        Multiple        Live Births   3                Past Medical History:   Diagnosis Date    Arthritis       Past Surgical History:   Procedure Laterality Date    ANKLE FRACTURE SURGERY      ANKLE HARDWARE REMOVAL Left 2025    Procedure: REMOVAL, HARDWARE, ANKLE;  Surgeon: Andrea Cerna MD;  Location: [facility];  Service: Orthopedics;   Laterality: Left;    FRACTURE SURGERY  10/11/22    Julio César went well but pain is still there    OPEN REDUCTION AND INTERNAL FIXATION (ORIF) OF PILON FRACTURE Left 10/11/2022    Procedure: ORIF, FRACTURE, PILON;  Surgeon: Momo Childs MD;  Location: [facility];  Service: Orthopedics;  Laterality: Left;        Social History[1]    Review of Systems  Pertinent items are noted in HPI.     Objective:     There were no vitals taken for this visit.  Physical Exam:  Gen: Well-nourished, well-developed female appearing stated age. Alert, cooperative, in no acute distress.      SEE COLPOSCOPY PROCEDURE NOTE      Assessment:       35 y.o.  here for:  1. ASCUS with positive high risk HPV cervical  Ambulatory referral/consult to Gynecology             COLPOSCOPIC IMPRESSION:      Plan:         Problem List Items Addressed This Visit    None  Visit Diagnoses         ASCUS with positive high risk HPV cervical                plan; follow up: Notify patient and PCP of test results.  Arrange additional treatment or follow up at that time.        Momo Pate MD  Family Medicine, HO-1            [1]   Social History  Tobacco Use    Smoking status: Some Days     Average packs/day: 0.5 packs/day for 11.0 years (5.5 ttl pk-yrs)     Types: Cigarettes     Start date:     Smokeless tobacco: Never    Tobacco comments:     Trying to quit for now   Substance Use Topics    Alcohol use: No     Comment: occasionally    Drug use: No

## 2025-08-09 ENCOUNTER — TELEPHONE (OUTPATIENT)
Dept: GYNECOLOGY | Facility: CLINIC | Age: 35
End: 2025-08-09
Payer: MEDICAID

## 2025-08-09 NOTE — TELEPHONE ENCOUNTER
Patient called x2, no response, left secure VM. Patient safe to take her Adalimumab after colposcopy and biopsy,    Rahul Metcalf D.O.  PGY-2  Obstetrics & Gynecology  [facility]

## 2025-08-11 ENCOUNTER — TELEPHONE (OUTPATIENT)
Dept: GYNECOLOGY | Facility: CLINIC | Age: 35
End: 2025-08-11
Payer: MEDICAID

## 2025-08-13 ENCOUNTER — TELEPHONE (OUTPATIENT)
Dept: GYNECOLOGY | Facility: CLINIC | Age: 35
End: 2025-08-13
Payer: MEDICAID

## 2025-08-18 ENCOUNTER — LAB VISIT (OUTPATIENT)
Dept: LAB | Facility: HOSPITAL | Age: 35
End: 2025-08-18
Attending: INTERNAL MEDICINE
Payer: MEDICAID

## 2025-08-18 ENCOUNTER — TELEPHONE (OUTPATIENT)
Dept: GYNECOLOGY | Facility: CLINIC | Age: 35
End: 2025-08-18
Payer: MEDICAID

## 2025-08-18 DIAGNOSIS — E55.9 VITAMIN D INSUFFICIENCY: ICD-10-CM

## 2025-08-18 DIAGNOSIS — Z79.620 ADALIMUMAB (HUMIRA) LONG-TERM USE: ICD-10-CM

## 2025-08-18 LAB
25(OH)D3+25(OH)D2 SERPL-MCNC: 13 NG/ML (ref 30–80)
ALBUMIN SERPL-MCNC: 3.8 G/DL (ref 3.5–5)
ALBUMIN/GLOB SERPL: 1.3 RATIO (ref 1.1–2)
ALP SERPL-CCNC: 68 UNIT/L (ref 40–150)
ALT SERPL-CCNC: 9 UNIT/L (ref 0–55)
ANION GAP SERPL CALC-SCNC: 5 MEQ/L
AST SERPL-CCNC: 9 UNIT/L (ref 11–45)
BASOPHILS # BLD AUTO: 0.04 X10(3)/MCL
BASOPHILS NFR BLD AUTO: 0.4 %
BILIRUB SERPL-MCNC: <0.1 MG/DL
BUN SERPL-MCNC: 9.8 MG/DL (ref 7–18.7)
CALCIUM SERPL-MCNC: 8.7 MG/DL (ref 8.4–10.2)
CHLORIDE SERPL-SCNC: 109 MMOL/L (ref 98–107)
CO2 SERPL-SCNC: 23 MMOL/L (ref 22–29)
CREAT SERPL-MCNC: 0.72 MG/DL (ref 0.55–1.02)
CREAT/UREA NIT SERPL: 14
CRP SERPL-MCNC: 2 MG/L
EOSINOPHIL # BLD AUTO: 0.13 X10(3)/MCL (ref 0–0.9)
EOSINOPHIL NFR BLD AUTO: 1.4 %
ERYTHROCYTE [DISTWIDTH] IN BLOOD BY AUTOMATED COUNT: 11.9 % (ref 11.5–17)
ERYTHROCYTE [SEDIMENTATION RATE] IN BLOOD: 15 MM/HR (ref 0–20)
GFR SERPLBLD CREATININE-BSD FMLA CKD-EPI: >60 ML/MIN/1.73/M2
GLOBULIN SER-MCNC: 3 GM/DL (ref 2.4–3.5)
GLUCOSE SERPL-MCNC: 91 MG/DL (ref 74–100)
HCT VFR BLD AUTO: 40.5 % (ref 37–47)
HGB BLD-MCNC: 13.1 G/DL (ref 12–16)
IMM GRANULOCYTES # BLD AUTO: 0.03 X10(3)/MCL (ref 0–0.04)
IMM GRANULOCYTES NFR BLD AUTO: 0.3 %
LYMPHOCYTES # BLD AUTO: 2.7 X10(3)/MCL (ref 0.6–4.6)
LYMPHOCYTES NFR BLD AUTO: 28.8 %
MCH RBC QN AUTO: 32 PG (ref 27–31)
MCHC RBC AUTO-ENTMCNC: 32.3 G/DL (ref 33–36)
MCV RBC AUTO: 98.8 FL (ref 80–94)
MONOCYTES # BLD AUTO: 0.74 X10(3)/MCL (ref 0.1–1.3)
MONOCYTES NFR BLD AUTO: 7.9 %
NEUTROPHILS # BLD AUTO: 5.72 X10(3)/MCL (ref 2.1–9.2)
NEUTROPHILS NFR BLD AUTO: 61.2 %
NRBC BLD AUTO-RTO: 0 %
PLATELET # BLD AUTO: 226 X10(3)/MCL (ref 130–400)
PMV BLD AUTO: 10.9 FL (ref 7.4–10.4)
POTASSIUM SERPL-SCNC: 4.3 MMOL/L (ref 3.5–5.1)
PROT SERPL-MCNC: 6.8 GM/DL (ref 6.4–8.3)
RBC # BLD AUTO: 4.1 X10(6)/MCL (ref 4.2–5.4)
SODIUM SERPL-SCNC: 137 MMOL/L (ref 136–145)
WBC # BLD AUTO: 9.36 X10(3)/MCL (ref 4.5–11.5)

## 2025-08-18 PROCEDURE — 85652 RBC SED RATE AUTOMATED: CPT

## 2025-08-18 PROCEDURE — 80053 COMPREHEN METABOLIC PANEL: CPT

## 2025-08-18 PROCEDURE — 86140 C-REACTIVE PROTEIN: CPT

## 2025-08-18 PROCEDURE — 86480 TB TEST CELL IMMUN MEASURE: CPT

## 2025-08-18 PROCEDURE — 85025 COMPLETE CBC W/AUTO DIFF WBC: CPT

## 2025-08-18 PROCEDURE — 82306 VITAMIN D 25 HYDROXY: CPT

## 2025-08-18 PROCEDURE — 36415 COLL VENOUS BLD VENIPUNCTURE: CPT

## 2025-08-19 ENCOUNTER — TELEPHONE (OUTPATIENT)
Dept: GYNECOLOGY | Facility: CLINIC | Age: 35
End: 2025-08-19
Payer: MEDICAID

## 2025-08-20 LAB
GAMMA INTERFERON BACKGROUND BLD IA-ACNC: 0.01 IU/ML
M TB IFN-G BLD-IMP: NEGATIVE
M TB IFN-G CD4+ BCKGRND COR BLD-ACNC: 0.01 IU/ML
M TB IFN-G CD4+CD8+ BCKGRND COR BLD-ACNC: 0 IU/ML
MITOGEN IGNF BCKGRD COR BLD-ACNC: 9.99 IU/ML

## (undated) DEVICE — DRAPE C-ARM COVER EZ 36X28IN

## (undated) DEVICE — GLOVE PROTEXIS LTX MICRO  7.5

## (undated) DEVICE — DRESSING GAUZE XEROFORM 5X9

## (undated) DEVICE — SPONGE LAP STRL 18X18IN

## (undated) DEVICE — GLOVE PROTEXIS BLUE LATEX 7.5

## (undated) DEVICE — DRESSING XEROFORM 1X8IN

## (undated) DEVICE — SPONGE GAUZE 4X4 12 PLY STRL

## (undated) DEVICE — DRAPE FULL SHEET 70X100IN

## (undated) DEVICE — SUT 3-0 ETHILON 18 FS-1

## (undated) DEVICE — DRAPE EXTREMITY STD 89X128IN

## (undated) DEVICE — COVER TABLE HVY DTY 60X90IN

## (undated) DEVICE — GLOVE SENSICARE PI GRN 6.5

## (undated) DEVICE — SUT ETHILON 2-0 FSLX 30 BLK

## (undated) DEVICE — GAUZE SPONGE 4X4 12PLY

## (undated) DEVICE — KIT BASIC ORTHO UNIVERSITY

## (undated) DEVICE — BIT DRILL NON LOCK 2.5X216MM

## (undated) DEVICE — DRILL 3.5

## (undated) DEVICE — SCREW BONE NON LOCK 3.5X14MM
Type: IMPLANTABLE DEVICE | Site: ANKLE | Status: NON-FUNCTIONAL
Removed: 2022-10-11

## (undated) DEVICE — Device

## (undated) DEVICE — COUNTERSINK

## (undated) DEVICE — BUCKET PLASTER DISPOSABLE

## (undated) DEVICE — GLOVE 8 PROTEXIS PI BLUE

## (undated) DEVICE — BIT DRILL AO SPEEDGUIDE 2X135M

## (undated) DEVICE — TOURNIQUET SB QC DP 30X4IN

## (undated) DEVICE — GOWN POLY REINF BRTH SLV XL

## (undated) DEVICE — HANDLE DEVON RIGID OR LIGHT

## (undated) DEVICE — SCREW BONE THREADED T8
Type: IMPLANTABLE DEVICE | Site: ANKLE | Status: NON-FUNCTIONAL
Removed: 2022-10-11

## (undated) DEVICE — SOL 9P NACL IRR PIC IL

## (undated) DEVICE — NDL HYPO REG 25G X 1 1/2

## (undated) DEVICE — PAD ABDOMINAL STERILE 8X10IN

## (undated) DEVICE — BNDG NS SWIFT WRP ELAS 6INX5YD

## (undated) DEVICE — GLOVE PROTEXIS NEOPRN SZ6.0

## (undated) DEVICE — APPLICATOR CHLORAPREP ORN 26ML

## (undated) DEVICE — GOWN POLY REINF X-LONG 2XL

## (undated) DEVICE — SUT PROLENE 0 MO6 30IN BLUE

## (undated) DEVICE — COVER PROXIMA MAYO STAND

## (undated) DEVICE — SCREW BONE AXSOS 4X28MM TI
Type: IMPLANTABLE DEVICE | Site: ANKLE | Status: NON-FUNCTIONAL
Removed: 2022-10-11

## (undated) DEVICE — SUT CTD VICRYL 2.0

## (undated) DEVICE — SEE L#159833

## (undated) DEVICE — GLOVE SENSICARE PI GRN 8.5

## (undated) DEVICE — PAD CAST 6X4YD SPECIALISTIC

## (undated) DEVICE — BANDAGE ESMARK ELASTIC ST 4X9

## (undated) DEVICE — GLOVE PROTEXIS BLUE LATEX 8

## (undated) DEVICE — GLOVE 8.0 PROTEXIS PI MICRO

## (undated) DEVICE — PADDING CAST SOFT-ROLL 4 X 4

## (undated) DEVICE — SCREW BONE CORTICAL 3.5X28MM T
Type: IMPLANTABLE DEVICE | Site: ANKLE | Status: NON-FUNCTIONAL
Removed: 2022-10-11

## (undated) DEVICE — KIT SURGICAL TURNOVER

## (undated) DEVICE — MANIFOLD 4 PORT

## (undated) DEVICE — PENCIL ROCKER SWITCH 10FT CORD

## (undated) DEVICE — ELECTRODE PATIENT RETURN DISP

## (undated) DEVICE — SYR 10CC LUER LOCK

## (undated) DEVICE — BANDAGE VELCLOSE ELAS 6INX5YD

## (undated) DEVICE — SUT 2/0 36IN COATED VICRYL

## (undated) DEVICE — TIP SUCTION YANKAUER

## (undated) DEVICE — K-WIRE TROCAR POINT 2X150MM
Type: IMPLANTABLE DEVICE | Site: ANKLE | Status: NON-FUNCTIONAL
Removed: 2022-10-11

## (undated) DEVICE — GLOVE PROTEXIS BLUE LATEX 6.5

## (undated) DEVICE — SCREW VARIAX NON LOK 2.7X20MM
Type: IMPLANTABLE DEVICE | Site: ANKLE | Status: NON-FUNCTIONAL
Removed: 2022-10-11

## (undated) DEVICE — GLOVE 7.5 PROTEXIS PI MICRO

## (undated) DEVICE — SOL NACL IRR 1000ML BTL

## (undated) DEVICE — SPLINT PLASTER FAST SET 5X30IN

## (undated) DEVICE — SUT ETHILON 3-0 FS-1 30

## (undated) DEVICE — IMPLANTABLE DEVICE
Type: IMPLANTABLE DEVICE | Site: ANKLE | Status: NON-FUNCTIONAL
Removed: 2022-10-11

## (undated) DEVICE — BLADE SURG STAINLESS STEEL #15

## (undated) DEVICE — SUT VICRYL CTD 2-0 GI 27 SH

## (undated) DEVICE — SUT BONE WAX 2.5 GRMS 12/BX

## (undated) DEVICE — DRAPE C-ARMOR EQUIPMENT COVER

## (undated) DEVICE — ALCOHOL 70% ISO RUBBING 16OZ